# Patient Record
Sex: FEMALE | Race: WHITE | Employment: UNEMPLOYED | URBAN - METROPOLITAN AREA
[De-identification: names, ages, dates, MRNs, and addresses within clinical notes are randomized per-mention and may not be internally consistent; named-entity substitution may affect disease eponyms.]

---
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
## 2018-06-19 ENCOUNTER — OFFICE VISIT (OUTPATIENT)
Dept: INTERNAL MEDICINE CLINIC | Age: 18
End: 2018-06-19
Payer: COMMERCIAL

## 2018-06-19 VITALS
HEART RATE: 88 BPM | WEIGHT: 112 LBS | TEMPERATURE: 98 F | DIASTOLIC BLOOD PRESSURE: 70 MMHG | OXYGEN SATURATION: 97 % | HEIGHT: 64 IN | BODY MASS INDEX: 19.12 KG/M2 | RESPIRATION RATE: 16 BRPM | SYSTOLIC BLOOD PRESSURE: 118 MMHG

## 2018-06-19 DIAGNOSIS — R10.9 FLANK PAIN: Primary | ICD-10-CM

## 2018-06-19 DIAGNOSIS — N30.00 ACUTE CYSTITIS WITHOUT HEMATURIA: ICD-10-CM

## 2018-06-19 DIAGNOSIS — Z20.9 COMMUNICABLE DISEASE CONTACT: ICD-10-CM

## 2018-06-19 DIAGNOSIS — Z30.019 ENCOUNTER FOR INITIAL PRESCRIPTION OF CONTRACEPTIVES, UNSPECIFIED CONTRACEPTIVE: ICD-10-CM

## 2018-06-19 LAB
BACTERIA: NORMAL /HPF
BILIRUBIN URINE: NEGATIVE
BILIRUBIN, POC: NORMAL
BLOOD URINE, POC: NORMAL
BLOOD, URINE: NEGATIVE
CLARITY, POC: CLEAR
CLARITY: CLEAR
COLOR, POC: YELLOW
COLOR: YELLOW
EPITHELIAL CELLS, UA: NORMAL /HPF
GLUCOSE URINE, POC: NORMAL
GLUCOSE URINE: NEGATIVE MG/DL
KETONES, POC: NORMAL
KETONES, URINE: NEGATIVE MG/DL
LEUKOCYTE EST, POC: NORMAL
LEUKOCYTE ESTERASE, URINE: ABNORMAL
NITRITE, POC: NORMAL
NITRITE, URINE: NEGATIVE
PH UA: 6.5 (ref 5–9)
PH, POC: 6
PROTEIN UA: NEGATIVE MG/DL
PROTEIN, POC: NORMAL
RBC UA: NORMAL /HPF (ref 0–2)
RENAL EPITHELIAL, UA: NORMAL /HPF
SPECIFIC GRAVITY UA: 1.01 (ref 1–1.03)
SPECIFIC GRAVITY, POC: 1.02
URINE REFLEX TO CULTURE: YES
UROBILINOGEN, POC: NORMAL
UROBILINOGEN, URINE: 0.2 E.U./DL
WBC UA: NORMAL /HPF (ref 0–5)

## 2018-06-19 PROCEDURE — 81002 URINALYSIS NONAUTO W/O SCOPE: CPT | Performed by: PHYSICIAN ASSISTANT

## 2018-06-19 PROCEDURE — 99213 OFFICE O/P EST LOW 20 MIN: CPT | Performed by: PHYSICIAN ASSISTANT

## 2018-06-19 PROCEDURE — 81025 URINE PREGNANCY TEST: CPT | Performed by: PHYSICIAN ASSISTANT

## 2018-06-19 RX ORDER — MEDROXYPROGESTERONE ACETATE 150 MG/ML
150 INJECTION, SUSPENSION INTRAMUSCULAR ONCE
Qty: 1 ML | Refills: 3
Start: 2018-06-19 | End: 2018-09-11

## 2018-06-19 RX ORDER — FLUCONAZOLE 150 MG/1
150 TABLET ORAL ONCE
Qty: 1 TABLET | Refills: 1 | Status: SHIPPED | OUTPATIENT
Start: 2018-06-19 | End: 2018-06-19

## 2018-06-19 RX ORDER — CIPROFLOXACIN 500 MG/1
500 TABLET, FILM COATED ORAL 2 TIMES DAILY
Qty: 20 TABLET | Refills: 0 | Status: SHIPPED | OUTPATIENT
Start: 2018-06-19 | End: 2018-06-29

## 2018-06-19 ASSESSMENT — ENCOUNTER SYMPTOMS
EYES NEGATIVE: 1
RESPIRATORY NEGATIVE: 1
GASTROINTESTINAL NEGATIVE: 1

## 2018-06-19 ASSESSMENT — PATIENT HEALTH QUESTIONNAIRE - PHQ9
1. LITTLE INTEREST OR PLEASURE IN DOING THINGS: 0
SUM OF ALL RESPONSES TO PHQ9 QUESTIONS 1 & 2: 1
2. FEELING DOWN, DEPRESSED OR HOPELESS: 1
SUM OF ALL RESPONSES TO PHQ QUESTIONS 1-9: 1

## 2018-06-22 DIAGNOSIS — A74.9 CHLAMYDIA INFECTION: Primary | ICD-10-CM

## 2018-06-22 LAB
C. TRACHOMATIS DNA ,URINE: POSITIVE
N. GONORRHOEAE DNA, URINE: NEGATIVE
ORGANISM: ABNORMAL
URINE CULTURE, ROUTINE: ABNORMAL
URINE CULTURE, ROUTINE: ABNORMAL

## 2018-06-22 RX ORDER — DOXYCYCLINE HYCLATE 100 MG
100 TABLET ORAL 2 TIMES DAILY
Qty: 14 TABLET | Refills: 0 | Status: SHIPPED | OUTPATIENT
Start: 2018-06-22 | End: 2018-06-29

## 2018-07-31 ENCOUNTER — OFFICE VISIT (OUTPATIENT)
Dept: INTERNAL MEDICINE CLINIC | Age: 18
End: 2018-07-31
Payer: COMMERCIAL

## 2018-07-31 VITALS
SYSTOLIC BLOOD PRESSURE: 108 MMHG | BODY MASS INDEX: 19.56 KG/M2 | HEART RATE: 68 BPM | TEMPERATURE: 98.6 F | HEIGHT: 64 IN | DIASTOLIC BLOOD PRESSURE: 68 MMHG | RESPIRATION RATE: 16 BRPM | OXYGEN SATURATION: 98 % | WEIGHT: 114.6 LBS

## 2018-07-31 DIAGNOSIS — Z20.9 COMMUNICABLE DISEASE CONTACT: ICD-10-CM

## 2018-07-31 DIAGNOSIS — Z20.9 COMMUNICABLE DISEASE CONTACT: Primary | ICD-10-CM

## 2018-07-31 PROCEDURE — 99214 OFFICE O/P EST MOD 30 MIN: CPT | Performed by: PHYSICIAN ASSISTANT

## 2018-07-31 ASSESSMENT — ENCOUNTER SYMPTOMS
COUGH: 0
SHORTNESS OF BREATH: 0
HEARTBURN: 0
ABDOMINAL PAIN: 0
CONSTIPATION: 0
BACK PAIN: 0
NAUSEA: 1
DIARRHEA: 0
SORE THROAT: 0
VOMITING: 0
WHEEZING: 0
BLURRED VISION: 0

## 2018-08-01 LAB
BILIRUBIN URINE: NEGATIVE
BLOOD, URINE: NEGATIVE
CLARITY: CLEAR
CLUE CELLS: NORMAL
COLOR: YELLOW
EPITHELIAL CELLS, UA: NORMAL /HPF
GLUCOSE URINE: NEGATIVE MG/DL
KETONES, URINE: NEGATIVE MG/DL
LEUKOCYTE ESTERASE, URINE: ABNORMAL
NITRITE, URINE: NEGATIVE
PH UA: 7 (ref 5–9)
PROTEIN UA: NEGATIVE MG/DL
RBC UA: NORMAL /HPF (ref 0–2)
RENAL EPITHELIAL, UA: NORMAL /HPF
SPECIFIC GRAVITY UA: 1.03 (ref 1–1.03)
TRICHOMONAS PREP: NORMAL
TRICHOMONAS VAGINALIS SCREEN: NEGATIVE
URINE REFLEX TO CULTURE: YES
UROBILINOGEN, URINE: 1 E.U./DL
WBC UA: NORMAL /HPF (ref 0–5)
YEAST WET PREP: NORMAL

## 2018-08-02 LAB — URINE CULTURE, ROUTINE: NORMAL

## 2018-08-03 LAB
GENITAL CULTURE, ROUTINE: ABNORMAL
GENITAL CULTURE, ROUTINE: ABNORMAL
ORGANISM: ABNORMAL

## 2018-08-03 RX ORDER — AZITHROMYCIN 250 MG/1
TABLET, FILM COATED ORAL
Qty: 1 PACKET | Refills: 0 | Status: SHIPPED | OUTPATIENT
Start: 2018-08-03 | End: 2018-08-07

## 2018-08-07 LAB
C TRACH DNA GENITAL QL NAA+PROBE: NEGATIVE
N. GONORRHOEAE DNA: NEGATIVE

## 2018-09-11 ENCOUNTER — OFFICE VISIT (OUTPATIENT)
Dept: INTERNAL MEDICINE CLINIC | Age: 18
End: 2018-09-11
Payer: COMMERCIAL

## 2018-09-11 VITALS
TEMPERATURE: 97.9 F | HEART RATE: 70 BPM | BODY MASS INDEX: 19.53 KG/M2 | HEIGHT: 64 IN | DIASTOLIC BLOOD PRESSURE: 62 MMHG | RESPIRATION RATE: 16 BRPM | OXYGEN SATURATION: 98 % | WEIGHT: 114.4 LBS | SYSTOLIC BLOOD PRESSURE: 122 MMHG

## 2018-09-11 DIAGNOSIS — N92.6 IRREGULAR MENSES: Primary | ICD-10-CM

## 2018-09-11 DIAGNOSIS — Z30.019 ENCOUNTER FOR INITIAL PRESCRIPTION OF CONTRACEPTIVES, UNSPECIFIED CONTRACEPTIVE: ICD-10-CM

## 2018-09-11 PROCEDURE — 81025 URINE PREGNANCY TEST: CPT | Performed by: PHYSICIAN ASSISTANT

## 2018-09-11 PROCEDURE — 99213 OFFICE O/P EST LOW 20 MIN: CPT | Performed by: PHYSICIAN ASSISTANT

## 2018-09-11 RX ORDER — MEDROXYPROGESTERONE ACETATE 150 MG/ML
150 INJECTION, SUSPENSION INTRAMUSCULAR ONCE
Qty: 1 ML | Refills: 3 | Status: SHIPPED | OUTPATIENT
Start: 2018-09-11 | End: 2018-10-04

## 2018-09-11 ASSESSMENT — ENCOUNTER SYMPTOMS
HEARTBURN: 0
ABDOMINAL PAIN: 1
VOMITING: 0
BACK PAIN: 0
SHORTNESS OF BREATH: 0
DIARRHEA: 1
BLURRED VISION: 0
SORE THROAT: 0
NAUSEA: 0
COUGH: 0
WHEEZING: 0

## 2018-09-11 NOTE — PROGRESS NOTES
tobacco: Never Used    Alcohol use No    Drug use: No    Sexual activity: Yes     Partners: Male     Other Topics Concern    Not on file     Social History Narrative    No narrative on file     No family history on file. Allergies   Allergen Reactions    Pcn [Penicillins]      Current Outpatient Prescriptions   Medication Sig Dispense Refill    medroxyPROGESTERone (DEPO-PROVERA) 150 MG/ML injection Inject 1 mL into the muscle once for 1 dose 1 mL 3     No current facility-administered medications for this visit. Objective    Vitals:    09/11/18 1532   BP: 122/62   Site: Right Upper Arm   Position: Sitting   Cuff Size: Medium Adult   Pulse: 70   Resp: 16   Temp: 97.9 °F (36.6 °C)   TempSrc: Oral   SpO2: 98%   Weight: 114 lb 6.4 oz (51.9 kg)   Height: 5' 4\" (1.626 m)     Physical Exam   Constitutional: She is oriented to person, place, and time and well-developed, well-nourished, and in no distress. HENT:   Head: Normocephalic and atraumatic. Eyes: Pupils are equal, round, and reactive to light. Conjunctivae and EOM are normal.   Neck: Normal range of motion. Neck supple. No thyromegaly present. Abdominal: Soft. Bowel sounds are normal. She exhibits no mass. There is no guarding. Mild discomfort llq   Musculoskeletal: Normal range of motion. Neurological: She is alert and oriented to person, place, and time. Gait normal.   Skin: Skin is warm and dry. Psychiatric: Affect normal.            Assessment & Plan    Diagnosis Orders   1. Irregular menses  TSH with Reflex    neg urine preg   2.  Encounter for initial prescription of contraceptives, unspecified contraceptive  POC Pregnancy Urine Qual    medroxyPROGESTERone (DEPO-PROVERA) 150 MG/ML injection         Orders Placed This Encounter   Procedures    TSH with Reflex     Standing Status:   Future     Standing Expiration Date:   9/11/2019    POC Pregnancy Urine Qual     Orders Placed This Encounter   Medications    medroxyPROGESTERone

## 2018-10-04 ENCOUNTER — OFFICE VISIT (OUTPATIENT)
Dept: INTERNAL MEDICINE CLINIC | Age: 18
End: 2018-10-04
Payer: COMMERCIAL

## 2018-10-04 VITALS
TEMPERATURE: 97.6 F | OXYGEN SATURATION: 99 % | DIASTOLIC BLOOD PRESSURE: 72 MMHG | HEART RATE: 96 BPM | SYSTOLIC BLOOD PRESSURE: 114 MMHG | RESPIRATION RATE: 16 BRPM | WEIGHT: 115.4 LBS | BODY MASS INDEX: 19.7 KG/M2 | HEIGHT: 64 IN

## 2018-10-04 DIAGNOSIS — Z20.9 COMMUNICABLE DISEASE CONTACT: ICD-10-CM

## 2018-10-04 DIAGNOSIS — Z20.9 COMMUNICABLE DISEASE CONTACT: Primary | ICD-10-CM

## 2018-10-04 PROCEDURE — 99214 OFFICE O/P EST MOD 30 MIN: CPT | Performed by: PHYSICIAN ASSISTANT

## 2018-10-04 ASSESSMENT — ENCOUNTER SYMPTOMS
ABDOMINAL PAIN: 1
VOMITING: 0
NAUSEA: 0
SORE THROAT: 0
CONSTIPATION: 0
BLURRED VISION: 0
COUGH: 0
WHEEZING: 0
BACK PAIN: 0
DIARRHEA: 0
SHORTNESS OF BREATH: 0
HEARTBURN: 0

## 2018-10-04 NOTE — PROGRESS NOTES
outpatient prescriptions on file. No current facility-administered medications for this visit. Objective    Vitals:    10/04/18 1015   BP: 114/72   Site: Right Upper Arm   Position: Sitting   Cuff Size: Medium Adult   Pulse: 96   Resp: 16   Temp: 97.6 °F (36.4 °C)   TempSrc: Oral   SpO2: 99%   Weight: 115 lb 6.4 oz (52.3 kg)   Height: 5' 4\" (1.626 m)     Physical Exam   Constitutional: She is oriented to person, place, and time and well-developed, well-nourished, and in no distress. Genitourinary: Vulva normal. Vagina exhibits normal mucosa and no lesion. Genitourinary Comments: On menses   Neurological: She is alert and oriented to person, place, and time. Skin: Skin is warm and dry. Assessment & Plan    Diagnosis Orders   1. Communicable disease contact           No orders of the defined types were placed in this encounter. No orders of the defined types were placed in this encounter. Medications Discontinued During This Encounter   Medication Reason    medroxyPROGESTERone (DEPO-PROVERA) 150 MG/ML injection Patient Choice     No Follow-up on file.     Yvonne Ladd PA-C

## 2018-10-05 LAB
CLUE CELLS: NORMAL
TRICHOMONAS PREP: NORMAL
TRICHOMONAS VAGINALIS SCREEN: NEGATIVE
YEAST WET PREP: NORMAL

## 2018-10-07 LAB
GENITAL CULTURE, ROUTINE: ABNORMAL
GENITAL CULTURE, ROUTINE: ABNORMAL
ORGANISM: ABNORMAL

## 2018-10-08 RX ORDER — FLUCONAZOLE 150 MG/1
150 TABLET ORAL ONCE
Qty: 1 TABLET | Refills: 1 | Status: SHIPPED | OUTPATIENT
Start: 2018-10-08 | End: 2018-10-08

## 2018-10-11 LAB
C TRACH DNA GENITAL QL NAA+PROBE: NEGATIVE
N. GONORRHOEAE DNA: NEGATIVE

## 2019-03-05 ENCOUNTER — OFFICE VISIT (OUTPATIENT)
Dept: INTERNAL MEDICINE CLINIC | Age: 19
End: 2019-03-05
Payer: COMMERCIAL

## 2019-03-05 VITALS
TEMPERATURE: 98.2 F | WEIGHT: 119.8 LBS | SYSTOLIC BLOOD PRESSURE: 100 MMHG | BODY MASS INDEX: 20.56 KG/M2 | OXYGEN SATURATION: 98 % | RESPIRATION RATE: 12 BRPM | DIASTOLIC BLOOD PRESSURE: 68 MMHG | HEART RATE: 86 BPM

## 2019-03-05 DIAGNOSIS — K42.9 UMBILICAL HERNIA WITHOUT OBSTRUCTION AND WITHOUT GANGRENE: ICD-10-CM

## 2019-03-05 DIAGNOSIS — N76.0 VAGINOSIS: Primary | ICD-10-CM

## 2019-03-05 LAB
BILIRUBIN, POC: NORMAL
BLOOD URINE, POC: NORMAL
CLARITY, POC: CLEAR
COLOR, POC: YELLOW
GLUCOSE URINE, POC: NORMAL
KETONES, POC: NORMAL
LEUKOCYTE EST, POC: NORMAL
NITRITE, POC: NORMAL
PH, POC: 7.5
PROTEIN, POC: NORMAL
SPECIFIC GRAVITY, POC: 1.01
UROBILINOGEN, POC: NORMAL

## 2019-03-05 PROCEDURE — 99213 OFFICE O/P EST LOW 20 MIN: CPT | Performed by: PHYSICIAN ASSISTANT

## 2019-03-05 PROCEDURE — 81002 URINALYSIS NONAUTO W/O SCOPE: CPT | Performed by: PHYSICIAN ASSISTANT

## 2019-03-05 RX ORDER — METRONIDAZOLE 250 MG/1
250 TABLET ORAL 3 TIMES DAILY
Qty: 21 TABLET | Refills: 0 | Status: SHIPPED | OUTPATIENT
Start: 2019-03-05 | End: 2019-03-12

## 2019-03-05 ASSESSMENT — PATIENT HEALTH QUESTIONNAIRE - PHQ9
SUM OF ALL RESPONSES TO PHQ9 QUESTIONS 1 & 2: 0
1. LITTLE INTEREST OR PLEASURE IN DOING THINGS: 0
SUM OF ALL RESPONSES TO PHQ QUESTIONS 1-9: 0
2. FEELING DOWN, DEPRESSED OR HOPELESS: 0
SUM OF ALL RESPONSES TO PHQ QUESTIONS 1-9: 0
DEPRESSION UNABLE TO ASSESS: FUNCTIONAL CAPACITY MOTIVATION LIMITS ACCURACY

## 2019-03-05 ASSESSMENT — ENCOUNTER SYMPTOMS
SHORTNESS OF BREATH: 0
ALLERGIC/IMMUNOLOGIC NEGATIVE: 1
EYE ITCHING: 0
ABDOMINAL PAIN: 0
SORE THROAT: 0
DIARRHEA: 0
CONSTIPATION: 0
COUGH: 0
SINUS PRESSURE: 0
EYE DISCHARGE: 0

## 2020-07-15 ENCOUNTER — OFFICE VISIT (OUTPATIENT)
Dept: OBGYN CLINIC | Age: 20
End: 2020-07-15
Payer: COMMERCIAL

## 2020-07-15 VITALS
BODY MASS INDEX: 21 KG/M2 | DIASTOLIC BLOOD PRESSURE: 58 MMHG | HEIGHT: 64 IN | WEIGHT: 123 LBS | SYSTOLIC BLOOD PRESSURE: 98 MMHG

## 2020-07-15 DIAGNOSIS — N91.2 AMENORRHEA: ICD-10-CM

## 2020-07-15 LAB
BACTERIA: NEGATIVE /HPF
BASOPHILS ABSOLUTE: 0 K/UL (ref 0–0.2)
BASOPHILS RELATIVE PERCENT: 0.3 %
BILIRUBIN URINE: NEGATIVE
BLOOD, URINE: NEGATIVE
CLARITY: CLEAR
COLOR: YELLOW
EOSINOPHILS ABSOLUTE: 0.2 K/UL (ref 0–0.7)
EOSINOPHILS RELATIVE PERCENT: 2.1 %
EPITHELIAL CELLS, UA: ABNORMAL /HPF (ref 0–5)
GLUCOSE URINE: NEGATIVE MG/DL
HCG, URINE, POC: POSITIVE
HCT VFR BLD CALC: 42.6 % (ref 37–47)
HEMOGLOBIN: 14.3 G/DL (ref 12–16)
HEPATITIS B SURFACE ANTIGEN INTERPRETATION: NORMAL
HYALINE CASTS: ABNORMAL /HPF (ref 0–5)
KETONES, URINE: NEGATIVE MG/DL
LEUKOCYTE ESTERASE, URINE: ABNORMAL
LYMPHOCYTES ABSOLUTE: 2.4 K/UL (ref 1–4.8)
LYMPHOCYTES RELATIVE PERCENT: 26.5 %
Lab: ABNORMAL
MCH RBC QN AUTO: 33 PG (ref 27–31.3)
MCHC RBC AUTO-ENTMCNC: 33.6 % (ref 33–37)
MCV RBC AUTO: 98.3 FL (ref 82–100)
MONOCYTES ABSOLUTE: 0.5 K/UL (ref 0.2–0.8)
MONOCYTES RELATIVE PERCENT: 5.8 %
NEGATIVE QC PASS/FAIL: ABNORMAL
NEUTROPHILS ABSOLUTE: 5.8 K/UL (ref 1.4–6.5)
NEUTROPHILS RELATIVE PERCENT: 65.3 %
NITRITE, URINE: NEGATIVE
PDW BLD-RTO: 13.2 % (ref 11.5–14.5)
PH UA: 8 (ref 5–9)
PLATELET # BLD: 224 K/UL (ref 130–400)
POSITIVE QC PASS/FAIL: ABNORMAL
PROTEIN UA: NEGATIVE MG/DL
RBC # BLD: 4.33 M/UL (ref 4.2–5.4)
RBC UA: ABNORMAL /HPF (ref 0–5)
RUBELLA ANTIBODY IGG: 67.4 IU/ML
SPECIFIC GRAVITY UA: 1.02 (ref 1–1.03)
UROBILINOGEN, URINE: 1 E.U./DL
WBC # BLD: 8.9 K/UL (ref 4.8–10.8)
WBC UA: ABNORMAL /HPF (ref 0–5)

## 2020-07-15 PROCEDURE — 99204 OFFICE O/P NEW MOD 45 MIN: CPT | Performed by: OBSTETRICS & GYNECOLOGY

## 2020-07-15 PROCEDURE — 81025 URINE PREGNANCY TEST: CPT | Performed by: OBSTETRICS & GYNECOLOGY

## 2020-07-15 RX ORDER — ASCORBIC ACID, CHOLECALCIFEROL, .ALPHA.-TOCOPHEROL ACETATE, DL-, PYRIDOXINE HYDROCHLORIDE, FOLIC ACID, CYANOCOBALAMIN, BIOTIN, CALCIUM CARBONATE, FERROUS ASPARTO GLYCINATE, IRON, POTASSIUM IODIDE, MAGNESIUM OXIDE, DOCONEXENT AND LOWBUSH BLUEBERRY 60; 1000; 10; 26; 400; 13; 280; 80; 9; 9; 150; 25; 350; 25; 600 MG/1; [IU]/1; [IU]/1; MG/1; UG/1; UG/1; UG/1; MG/1; MG/1; MG/1; UG/1; MG/1; MG/1; MG/1; UG/1
1 CAPSULE, GELATIN COATED ORAL DAILY
Qty: 30 CAPSULE | Refills: 5 | Status: SHIPPED | OUTPATIENT
Start: 2020-07-15 | End: 2020-10-20 | Stop reason: SDUPTHER

## 2020-07-15 RX ORDER — DOCUSATE SODIUM 100 MG/1
100 CAPSULE, LIQUID FILLED ORAL 2 TIMES DAILY
Qty: 60 CAPSULE | Refills: 3 | Status: SHIPPED | OUTPATIENT
Start: 2020-07-15 | End: 2020-08-14

## 2020-07-15 ASSESSMENT — PATIENT HEALTH QUESTIONNAIRE - PHQ9
SUM OF ALL RESPONSES TO PHQ QUESTIONS 1-9: 0
2. FEELING DOWN, DEPRESSED OR HOPELESS: 0
SUM OF ALL RESPONSES TO PHQ QUESTIONS 1-9: 0
SUM OF ALL RESPONSES TO PHQ9 QUESTIONS 1 & 2: 0
1. LITTLE INTEREST OR PLEASURE IN DOING THINGS: 0

## 2020-07-15 ASSESSMENT — ENCOUNTER SYMPTOMS
VOMITING: 0
ABDOMINAL PAIN: 0
NAUSEA: 0
SHORTNESS OF BREATH: 0
COUGH: 0
DIARRHEA: 0
WHEEZING: 0
ABDOMINAL DISTENTION: 0
SORE THROAT: 0
CONSTIPATION: 0
BLOOD IN STOOL: 0

## 2020-07-16 LAB
ABO/RH: NORMAL
ANTIBODY SCREEN: NORMAL
RPR: NORMAL

## 2020-07-16 NOTE — PROGRESS NOTES
Subjective:      Patient ID:  Annette Urena is a 22 y.o. female with chief complaint of:  Chief Complaint   Patient presents with    Amenorrhea       Patient presents for pregnancy confirmation. Patient denies bleeding cramping, however noted occasional nausea and vomiting and breast tenderness. Pt with previous sab no other concerning gyn history      History reviewed. No pertinent past medical history. History reviewed. No pertinent surgical history. Family History   Problem Relation Age of Onset    Cancer Sister         leukemia     No current outpatient medications on file prior to visit. No current facility-administered medications on file prior to visit. Allergies:  Pcn [penicillins]    Review of Systems   Constitutional: Negative for activity change, appetite change, fatigue and unexpected weight change. HENT: Negative for nosebleeds and sore throat. Eyes: Negative for visual disturbance. Respiratory: Negative for cough, shortness of breath and wheezing. Cardiovascular: Negative for chest pain, palpitations and leg swelling. Gastrointestinal: Negative for abdominal distention, abdominal pain, blood in stool, constipation, diarrhea, nausea and vomiting. Endocrine: Negative for cold intolerance, heat intolerance, polydipsia and polyuria. Genitourinary: Negative for difficulty urinating, dyspareunia, dysuria, frequency, genital sores, hematuria, menstrual problem, pelvic pain, urgency, vaginal bleeding, vaginal discharge and vaginal pain. Musculoskeletal: Negative for arthralgias. Skin: Negative for rash. Neurological: Negative for dizziness, weakness, light-headedness and headaches. Hematological: Negative for adenopathy. Does not bruise/bleed easily. Psychiatric/Behavioral: Negative for confusion and sleep disturbance.        Objective:   BP (!) 98/58   Ht 5' 4\" (1.626 m)   Wt 123 lb (55.8 kg)   LMP 05/09/2020   BMI 21.11 kg/m²      Physical Exam  Constitutional: General: She is not in acute distress. Appearance: She is well-developed. She is not diaphoretic. HENT:      Nose: Nose normal.   Eyes:      Conjunctiva/sclera: Conjunctivae normal.      Pupils: Pupils are equal, round, and reactive to light. Neck:      Thyroid: No thyromegaly. Trachea: No tracheal deviation. Cardiovascular:      Rate and Rhythm: Normal rate and regular rhythm. Heart sounds: Normal heart sounds. No murmur. No friction rub. No gallop. Pulmonary:      Effort: Pulmonary effort is normal. No respiratory distress. Breath sounds: Normal breath sounds. No wheezing or rales. Chest:      Chest wall: No tenderness. Abdominal:      General: Bowel sounds are normal. There is no distension. Palpations: Abdomen is soft. There is no mass. Tenderness: There is no abdominal tenderness. There is no guarding or rebound. Comments: No fetal heart tones   Genitourinary:     Labia:         Right: No rash, tenderness or lesion. Left: No rash, tenderness or lesion. Vagina: Normal. No vaginal discharge, erythema, tenderness or bleeding. Cervix: No cervical motion tenderness, discharge or friability. Uterus: Not deviated, not enlarged, not fixed and not tender. Adnexa:         Right: No mass, tenderness or fullness. Left: No mass, tenderness or fullness. Comments: Uterus is not prolapsed and there is not a cystocele or rectocele noted  Skin:     General: Skin is warm and dry. Neurological:      Mental Status: She is alert and oriented to person, place, and time. Cranial Nerves: No cranial nerve deficit. Deep Tendon Reflexes: Reflexes normal.   Psychiatric:         Mood and Affect: Mood normal.         Behavior: Behavior normal.         Judgment: Judgment normal.         Assessment:       Diagnosis Orders   1.  Amenorrhea  POC Pregnancy Urine Qual    PAP SMEAR    US OB Transvaginal    US OB LESS THAN 14 WEEKS SINGLE OR FIRST GESTATION    Culture, Urine    Urinalysis    Drug Panel 9A Screen, Urine    CBC Auto Differential    Cystic fibrosis carrier study    Hemoglobinopathy Eval (Electrophoresis)    Hepatitis B Surface Antigen    Herpes Simplex Virus (HSV) I Glycoprotein Antibody IgG    Herpes Simplex Virus (HSV) II Glycoprotein Antibody IgG    HIV-1,2 Combo Ag/Ab By TY, Reflexive Panel    RPR Reflex to Titer and TPPA    Rubella antibody, IgG    Type and screen    Varicella Zoster Antibody, IgG   2. Screening for HPV (human papillomavirus)  PAP SMEAR   3. Routine screening for STI (sexually transmitted infection)  PAP SMEAR         Plan:      Orders Placed This Encounter   Procedures    Culture, Urine     Standing Status:   Future     Number of Occurrences:   1     Standing Expiration Date:   7/15/2021     Order Specific Question:   Specify (ex-cath, midstream, cysto, etc)? Answer:   midstream    US OB Transvaginal     Standing Status:   Future     Standing Expiration Date:   7/15/2021     Order Specific Question:   Reason for exam:     Answer:   EFW, GUILLERMO, presenation and placenta evaluation    US OB LESS THAN 14 WEEKS SINGLE OR FIRST GESTATION     Standing Status:   Future     Standing Expiration Date:   7/15/2021     Order Specific Question:   Reason for exam:     Answer:   EFW, GUILLERMO, presenation and placenta evaluation    PAP SMEAR     Standing Status:   Future     Standing Expiration Date:   7/15/2021     Order Specific Question:   Collection Type     Answer: Thin Prep     Order Specific Question:   Prior Abnormal Pap Test     Answer:   No     Order Specific Question:   Screening or Diagnostic     Answer:   Screening     Order Specific Question:   HPV Requested?      Answer:   Yes -  If ASCUS Reflex HPV     Comments:   16/18     Order Specific Question:   High Risk Patient     Answer:   N/A    Urinalysis     Standing Status:   Future     Number of Occurrences:   1     Standing Expiration Date:   7/15/2021    Drug

## 2020-07-17 LAB
ALCOHOL URINE: NEGATIVE MG/DL
AMPHETAMINE SCREEN, URINE: NEGATIVE NG/ML
BARBITURATE SCREEN URINE: NEGATIVE NG/ML
BENZODIAZEPINE SCREEN, URINE: NEGATIVE NG/ML
CANNABINOID SCREEN URINE: POSITIVE NG/ML
COCAINE METABOLITE SCREEN URINE: NEGATIVE NG/ML
CREATININE URINE: 156.1 MG/DL (ref 20–400)
Lab: NORMAL
MDMA URINE: NEGATIVE NG/ML
METHADONE SCREEN, URINE: NEGATIVE NG/ML
OPIATE SCREEN URINE: NEGATIVE NG/ML
OXYCODONE SCREEN URINE: NEGATIVE NG/ML
PHENCYCLIDINE SCREEN URINE: NEGATIVE NG/ML
PROPOXYPHENE SCREEN: NEGATIVE NG/ML
URINE CULTURE, ROUTINE: NORMAL

## 2020-07-18 LAB
HEMOGLOBIN A-1 QUANTITATION: 96.9 % (ref 95–97.9)
HEMOGLOBIN A2 QUANTITATION: 2.8 % (ref 2–3.5)
HEMOGLOBIN C QUANTITATION: 0 % (ref 0–0)
HEMOGLOBIN E QUANTITATION: 0 % (ref 0–0)
HEMOGLOBIN ELECTROPHORESIS: NORMAL
HEMOGLOBIN EVALUATION: NORMAL
HEMOGLOBIN F QUANTITATION: 0.3 % (ref 0–2.1)
HEMOGLOBIN OTHER: 0 % (ref 0–0)
HEMOGLOBIN S QUANTITATION: 0 % (ref 0–0)
HIV 1,2 COMBO ANTIGEN/ANTIBODY: NEGATIVE
HSV 1 GLYCOPROTEIN G AB IGG: 0.15 IV
HSV 2 GLYCOPROTEIN G AB IGG: 21.4 IV
SICKLE CELL: NORMAL
VZV IGG SER QL IA: 315.1 IV

## 2020-07-26 LAB
CYSTIC FIBROSIS 165 VARIANTS INTERP: NORMAL
CYSTIC FIBROSIS 5T VARIANT: NORMAL
CYSTIC FIBROSIS ALLELE 1: NEGATIVE
CYSTIC FIBROSIS ALLELE 2: NEGATIVE

## 2020-07-27 ENCOUNTER — TELEPHONE (OUTPATIENT)
Dept: OBGYN CLINIC | Age: 20
End: 2020-07-27

## 2020-07-27 NOTE — TELEPHONE ENCOUNTER
lvm for pt to james back for results. Pt pap is normal but BV was detected and pt will start antibiotic for it at 12 wks.  WC will put order once pt is able to start taking medication

## 2020-08-05 ENCOUNTER — HOSPITAL ENCOUNTER (OUTPATIENT)
Dept: ULTRASOUND IMAGING | Age: 20
Discharge: HOME | End: 2020-08-07
Payer: COMMERCIAL

## 2020-08-05 PROCEDURE — 76801 OB US < 14 WKS SINGLE FETUS: CPT

## 2020-08-07 ENCOUNTER — TELEPHONE (OUTPATIENT)
Dept: OBGYN CLINIC | Age: 20
End: 2020-08-07

## 2020-08-07 ENCOUNTER — INITIAL PRENATAL (OUTPATIENT)
Dept: OBGYN CLINIC | Age: 20
End: 2020-08-07

## 2020-08-07 VITALS
BODY MASS INDEX: 21.73 KG/M2 | HEART RATE: 94 BPM | WEIGHT: 126.6 LBS | SYSTOLIC BLOOD PRESSURE: 98 MMHG | DIASTOLIC BLOOD PRESSURE: 60 MMHG

## 2020-08-07 DIAGNOSIS — Z3A.12 12 WEEKS GESTATION OF PREGNANCY: ICD-10-CM

## 2020-08-07 DIAGNOSIS — Z34.82 ENCOUNTER FOR SUPERVISION OF OTHER NORMAL PREGNANCY IN SECOND TRIMESTER: ICD-10-CM

## 2020-08-07 RX ORDER — METRONIDAZOLE 500 MG/1
500 TABLET ORAL 2 TIMES DAILY
Qty: 14 TABLET | Refills: 0 | Status: SHIPPED | OUTPATIENT
Start: 2020-08-07 | End: 2020-08-14

## 2020-08-07 NOTE — TELEPHONE ENCOUNTER
Pt was seen today thought flagyl would be called into the pharmacy, please advise if you will be sending the medication to [facility]/[address]. Pt phone number is [phone].

## 2020-08-07 NOTE — PROGRESS NOTES
Initial OB visit      Parveen Landeros is a 22y.o. year old female  @ 12.6w presents for continuation of pregnancy care. Labs reviewed and pelvic US reviewed. The US is consistent with lmp. No complaints tolerating diet no problems with bladder or bowel function. Patient informed about HSV results she is very upset. Family history of Chromosome 4p/ trisomy 25 and congenital heart defect among siblings    POB: 1 sab    PGYN: na/a    PMH: n/a    PSH: n/a     MEDS: prenatal vit    Drug Allergies: pcn family history    SOCHX: negative times 3    FH: Mother or Father , DM No , HTN No, Other No    BP 98/60   Pulse 94   Wt 126 lb 9.6 oz (57.4 kg)   LMP 2020   BMI 21.73 kg/m²   No past medical history on file. No past surgical history on file. Review of Systems  Constitutional: negative for anorexia, chills and fatigue  Genitourinary:negative for genital lesions and vaginal discharge, dysuria and frequency, see above  Integument/breast: negative for dryness and pruritus  Behavioral/Psych: negative for abusive relationship, aggressive behavior and anxiety  Endocrine: negative     All other systems reviewed and are negative. Physical Exam:  Skin: Warm, dry, no lesions or rashes  Extremities: Without clubbing, cyanosis and edema. Palms and nails are normal. Ambulates without difficulty  Neurological: No gross sensory or motor deficits. Abdomen: Soft, non-tender without masses or organomegaly  bowel sounds normoactive    Assessment:   1. Encounter for supervision of other normal pregnancy in second trimester    2. 12 weeks gestation of pregnancy        Plan:   Orders Placed This Encounter   Procedures    Prenatal Testing for Fetal Aneuploidy     Standing Status:   Future     Standing Expiration Date:   2021      No orders of the defined types were placed in this encounter.     Plan:   DO Lyric Ibarra  2020  Patient's last menstrual period was 2020. INITIAL OBSTETRICAL VISIT EVALUATION:  The patient was seen full history and physical was completed/reviewed. Cytology was collected for patients over 24years of age. Cultures were collected. The patient was counseled on office policies and she was counseled on termination of pregnancy in the state of [place]. The patient was counseled on Toxoplasmosis, HIV, Tobacco Abuse, Group Beta Strep Infections, Cystic Fibrosis,  Labor precautions and Sickle Cell disease. The patient was counseled on the risks of tobacco abuse. Both maternal and fetal. She was instructed to stop smoking if currently using tobacco. Morbidity, mortality, and cessation programs were reviewed. The risks include but are not limited to increased risks of  labor,  delivery, premature rupture of membranes, intrauterine growth restriction, intrauterine fetal demise and abruptio placenta. Secondary smoke risks were also reviewed. Increases in cancer, respiratory problems, and sudden infant death syndrome were reviewed as well. The patient was informed of a 2-4% risk of congenital anomalies in the general population. She was also informed that karyotyping is the only way to evaluate the fetus for genetic problems and genetic lethal anomalies. Chorionic villous sampling, amniocentesis and Maternal Genetic Blood Sampling-(NIPT Testing) were also discussed with morbidity rates in detail. She requested any of the options. Route of delivery and counseling on vaginal, operative vaginal, and  sections were completed with the risks of each to both the patient as well as her baby. The possibility of a blood transfusion was discussed as well. The patient was not opposed to receiving a transfusion if needed. First trimester screening and MSAFP single marker testing was reviewed in detail with attention to timing of testing and their windows. A second trimester amniocentesis with M consults is also made

## 2020-08-15 LAB
EER NON INVASIVE PRENATAL ANEUPLOIDY: NORMAL
FETAL FRACTION: 11.9 %
FETAL GENDER: NORMAL
FETUS COUNT: 1
GESTATIONAL AGE (DAYS): 6
GESTATIONAL AGE(WEEKS): 12
HEIGHT: NORMAL
Lab: NORMAL
MATERNAL WEIGHT: 123
MONOSOMY X: NORMAL
REPORT FETUS GENDER: YES
TRIPLOIDY (VANISHING TWIN): NORMAL
TRISOMY 13 RISK: NORMAL
TRISOMY 18 RISK ASSESSMENT: NORMAL
TRISOMY 21 RISK: NORMAL

## 2020-09-04 ENCOUNTER — ROUTINE PRENATAL (OUTPATIENT)
Dept: OBGYN CLINIC | Age: 20
End: 2020-09-04

## 2020-09-04 VITALS
WEIGHT: 128.6 LBS | DIASTOLIC BLOOD PRESSURE: 60 MMHG | BODY MASS INDEX: 22.07 KG/M2 | SYSTOLIC BLOOD PRESSURE: 100 MMHG | HEART RATE: 82 BPM

## 2020-09-04 DIAGNOSIS — Z3A.16 16 WEEKS GESTATION OF PREGNANCY: ICD-10-CM

## 2020-09-04 DIAGNOSIS — Z34.82 ENCOUNTER FOR SUPERVISION OF OTHER NORMAL PREGNANCY IN SECOND TRIMESTER: ICD-10-CM

## 2020-09-04 PROCEDURE — 0502F SUBSEQUENT PRENATAL CARE: CPT | Performed by: OBSTETRICS & GYNECOLOGY

## 2020-09-04 NOTE — PROGRESS NOTES
Patient's last menstrual period was 05/09/2020. Please reference prenatal and OB flow chart for further information  PT here today for routine prenatal care  Pt endorses fetal movement and denies loss of fluid, contractions or vaginal bleeding  Pt without complaints  ROS:  Pt denies headache, dysuria, nausea/vomiting  PE:  /60   Pulse 82   Wt 128 lb 9.6 oz (58.3 kg)   LMP 05/09/2020   BMI 22.07 kg/m²   Gen - Alert and oriented x 3  HEENT- NC/AT, CVS - RRR, Lungs - CTAB  Abd - FH Appropriate fetal growth  LE no edema  Reassuring fetal status at this time     Diagnosis Orders   1. Encounter for supervision of other normal pregnancy in second trimester  Alpha Fetoprotein, Maternal    US OB 14 PLUS WEEKS SINGLE OR FIRST GESTATION    US OB TRANSVAGINAL   2. 16 weeks gestation of pregnancy  Alpha Fetoprotein, Maternal    US OB 14 PLUS WEEKS SINGLE OR FIRST GESTATION    US OB TRANSVAGINAL       Upon completion of the visit all questions were answered and the patients follow-up and testing schedule were reviewed.

## 2020-09-06 LAB
AFP INTERPRETATION: NORMAL
AFP MOM: 1.05
AFP SPECIMEN: NORMAL
DATING: NORMAL
ESTIMATED DUE DATE: NORMAL
FETUS COUNT: NORMAL
GESTATIONAL AGE CALC AT COLLECT: NORMAL
HISTORY/NEURAL TUBE DEFECTS: NO
INSULIN DEP. DIABETIC: NO
MATERNAL AGE AT EDD: 22.3 YR
MATERNAL WEIGHT: NORMAL
PT AFP: 43 NG/ML
RACE: NORMAL
SMOKING: NO

## 2020-09-18 ENCOUNTER — TELEPHONE (OUTPATIENT)
Dept: OBGYN CLINIC | Age: 20
End: 2020-09-18

## 2020-09-21 ENCOUNTER — ROUTINE PRENATAL (OUTPATIENT)
Dept: OBGYN CLINIC | Age: 20
End: 2020-09-21

## 2020-09-21 VITALS
WEIGHT: 132 LBS | HEART RATE: 77 BPM | BODY MASS INDEX: 22.66 KG/M2 | SYSTOLIC BLOOD PRESSURE: 100 MMHG | DIASTOLIC BLOOD PRESSURE: 62 MMHG

## 2020-09-21 DIAGNOSIS — B96.89 BACTERIAL VAGINITIS: ICD-10-CM

## 2020-09-21 DIAGNOSIS — B37.31 CANDIDAL VAGINITIS: ICD-10-CM

## 2020-09-21 DIAGNOSIS — N76.0 BACTERIAL VAGINITIS: ICD-10-CM

## 2020-09-21 PROCEDURE — 0502F SUBSEQUENT PRENATAL CARE: CPT | Performed by: ADVANCED PRACTICE MIDWIFE

## 2020-09-21 RX ORDER — METRONIDAZOLE 500 MG/1
500 TABLET ORAL 2 TIMES DAILY
Qty: 14 TABLET | Refills: 1 | Status: SHIPPED | OUTPATIENT
Start: 2020-09-21 | End: 2020-09-28

## 2020-09-21 RX ORDER — MICONAZOLE NITRATE 2 %
CREAM WITH APPLICATOR VAGINAL
Qty: 1 KIT | Refills: 0 | Status: ON HOLD | OUTPATIENT
Start: 2020-09-21 | End: 2021-01-07 | Stop reason: HOSPADM

## 2020-09-21 NOTE — PROGRESS NOTES
Thinks she may have an STD. Has recent dx of herpes, doesn't see any lesions but has irritation, painful vaginal area and a yellowish green discharge no odor.

## 2020-09-21 NOTE — PATIENT INSTRUCTIONS
Patient Education        miconazole vaginal  Pronunciation:  my CAW nah zole  Brand:  Micon 7, Miconazole 3, Monistat 3, Monistat 7, Monistat-1 Day or Night, M-Zole 3, Vagistat-3  What is the most important information I should know about miconazole vaginal?  If this is the first time that you have ever had symptoms of a vaginal yeast infection, consult your doctor before using this medication. What is miconazole vaginal?  Miconazole is an antifungal medication. It prevents fungus from growing. Miconazole vaginal is used to treat vaginal candida (yeast) infections. Miconazole vaginal may also be used for purposes not listed in this medication guide. What should I discuss with my healthcare provider before using miconazole vaginal?  If this is the first time that you have ever had symptoms of a vaginal yeast infection, consult your doctor before using this medication. You should not use miconazole vaginal if you have ever had an allergic reaction to it. To make sure miconazole vaginal is safe for you, tell your doctor if you have:  · a fever,  · abdominal pain,  · foul-smelling discharge,  · diabetes, or  · HIV or AIDS. Do not use this medicine without a doctor's advice if you are pregnant. Do not use this medicine without a doctor's advice if you are breast-feeding a baby. How should I use miconazole vaginal?  Use exactly as directed on the label, or as prescribed by your doctor. Do not use in larger or smaller amounts or for longer than recommended. Do not take this medicine by mouth. It is for use only in your vagina. Wash your hands before and after using miconazole vaginal.  Insert the tablet, suppository, or cream into the vagina using the applicator as directed. You can use a sanitary napkin to prevent the medication from staining your clothing but do not use a tampon. Use this medication for the full prescribed length of time, even during your menstrual period.  Your symptoms may improve effect on vaginally applied miconazole. But many drugs can interact with each other. Tell each of your health care providers about all medicines you use, including prescription and over-the-counter medicines, vitamins, and herbal products. Where can I get more information? Your pharmacist can provide more information about miconazole vaginal.  Remember, keep this and all other medicines out of the reach of children, never share your medicines with others, and use this medication only for the indication prescribed. Every effort has been made to ensure that the information provided by [address] is accurate, up-to-date, and complete, but no guarantee is made to that effect. Drug information contained herein may be time sensitive. [facility] information has been compiled for use by healthcare practitioners and consumers in the [place] and therefore [facility] does not warrant that uses outside of the [place] are appropriate, unless specifically indicated otherwise. [facility]'s drug information does not endorse drugs, diagnose patients or recommend therapy. [facility] drug information is an informational resource designed to assist licensed healthcare practitioners in caring for their patients and/or to serve consumers viewing this service as a supplement to, and not a substitute for, the expertise, skill, knowledge and judgment of healthcare practitioners. The absence of a warning for a given drug or drug combination in no way should be construed to indicate that the drug or drug combination is safe, effective or appropriate for any given patient. [facility] does not assume any responsibility for any aspect of healthcare administered with the aid of information [facility] provides. The information contained herein is not intended to cover all possible uses, directions, precautions, warnings, drug interactions, allergic reactions, or adverse effects.  If you have questions about the drugs you are taking, check with your doctor, nurse or pharmacist.  Copyright 1982-0553 Vern [address] [place] [address] Avenue: 4.07. Revision date: 11/19/2013. Care instructions adapted under license by [facility] ([facility]). If you have questions about a medical condition or this instruction, always ask your healthcare professional. [place] [address] any warranty or liability for your use of this information.

## 2020-09-21 NOTE — PROGRESS NOTES
S:  Denies bleeding, spotting, leaking of fluid. Good fetal movement. Concerns today:  Generalized vulvar pain, peeling skin, copious greenish/yellow vaginal discharge.  C/o mild-moderate vaginal discomfort, described as sensitivity, irritation, tenderness   Associated symptoms:  Denies fever, headache, malaise, lymphadenopathy, myalgias. Denies dysuria, frequency, urgency.  Activities that aggravate:  None   Treatments tried:   None   Possible STD exposure within the last 90 days:  Yes, Use of barrier protection:  No    Review of Systems   Constitutional: Negative for activity change, appetite change and fever. Eyes: Negative for visual disturbance. Respiratory: Negative for shortness of breath. Gastrointestinal: Negative for abdominal pain, constipation, diarrhea, nausea and vomiting. Genitourinary: Positive for vaginal discharge. Negative for dysuria and vaginal bleeding. Neurological: Negative for headaches. O:    Fundal Height:  At umbilicus    Physical Exam  Constitutional:       General: She is not in acute distress. Appearance: Normal appearance. Cardiovascular:      Rate and Rhythm: Normal rate and regular rhythm. Pulmonary:      Effort: Pulmonary effort is normal. No respiratory distress. Abdominal:      Palpations: Abdomen is soft. Tenderness: There is no abdominal tenderness. Hernia: There is no hernia in the left inguinal area or right inguinal area. Genitourinary:     Labia:         Right: Tenderness present. No rash, lesion or injury. Left: Tenderness present. No rash, lesion or injury. Urethra: No prolapse, urethral pain, urethral swelling or urethral lesion. Vagina: No signs of injury and foreign body. Vaginal discharge (copious, yellow/green, watery and curdlike) present. No erythema, tenderness, bleeding, lesions or prolapsed vaginal walls.       Cervix: No cervical motion tenderness, discharge, friability, lesion, erythema, cervical bleeding or eversion. Uterus: Not tender. Rectum: No mass or external hemorrhoid. Musculoskeletal:         General: No swelling. Right lower leg: No edema. Left lower leg: No edema. Lymphadenopathy:      Lower Body: No right inguinal adenopathy. No left inguinal adenopathy. Skin:     General: Skin is warm and dry. Capillary Refill: Capillary refill takes less than 2 seconds. Neurological:      Mental Status: She is alert and oriented to person, place, and time. Mental status is at baseline. Sensory: No sensory deficit. Deep Tendon Reflexes: Reflexes normal.   Psychiatric:         Mood and Affect: Mood normal.         Behavior: Behavior normal.       A:  22 y.o. female  IUP at 19w2d    Patient Active Problem List    Diagnosis Date Noted    Candidal vaginitis 2020    Bacterial vaginitis 2020      Diagnosis Orders   1. Encounter for supervision of other normal pregnancy in second trimester     2. 19 weeks gestation of pregnancy     3. Vaginal discomfort  C.trachomatis N.gonorrhoeae DNA    Wet prep, genital   4. Candidal vaginitis  Miconazole Nitrate Applicator (MONISTAT 7 COMBO PACK BASILIO) 100 & 2 MG-% (9GM) KIT   5. Bacterial vaginitis  metroNIDAZOLE (FLAGYL) 500 MG tablet       P:     Return in 11 days (on 10/2/2020), or if symptoms worsen or fail to improve, for Prenatal Care Visit, [address] appointment tomorrow (20).     GENNARO Pringle CNM

## 2020-09-22 ENCOUNTER — HOSPITAL ENCOUNTER (OUTPATIENT)
Dept: ULTRASOUND IMAGING | Age: 20
Discharge: HOME | End: 2020-09-24
Payer: COMMERCIAL

## 2020-09-22 PROBLEM — N76.0 BACTERIAL VAGINITIS: Status: ACTIVE | Noted: 2020-09-22

## 2020-09-22 PROBLEM — B96.89 BACTERIAL VAGINITIS: Status: ACTIVE | Noted: 2020-09-22

## 2020-09-22 PROBLEM — B37.31 CANDIDAL VAGINITIS: Status: ACTIVE | Noted: 2020-09-22

## 2020-09-22 PROCEDURE — 76805 OB US >/= 14 WKS SNGL FETUS: CPT

## 2020-09-22 ASSESSMENT — ENCOUNTER SYMPTOMS
ABDOMINAL PAIN: 0
CONSTIPATION: 0
DIARRHEA: 0
NAUSEA: 0
SHORTNESS OF BREATH: 0
VOMITING: 0

## 2020-09-24 LAB
C TRACH DNA GENITAL QL NAA+PROBE: NEGATIVE
N. GONORRHOEAE DNA: NEGATIVE

## 2020-10-02 ENCOUNTER — ROUTINE PRENATAL (OUTPATIENT)
Dept: OBGYN CLINIC | Age: 20
End: 2020-10-02

## 2020-10-02 VITALS
WEIGHT: 134.4 LBS | BODY MASS INDEX: 23.07 KG/M2 | SYSTOLIC BLOOD PRESSURE: 100 MMHG | DIASTOLIC BLOOD PRESSURE: 64 MMHG | HEART RATE: 88 BPM

## 2020-10-02 PROCEDURE — 0502F SUBSEQUENT PRENATAL CARE: CPT | Performed by: OBSTETRICS & GYNECOLOGY

## 2020-10-20 RX ORDER — ASCORBIC ACID, CHOLECALCIFEROL, .ALPHA.-TOCOPHEROL ACETATE, DL-, PYRIDOXINE HYDROCHLORIDE, FOLIC ACID, CYANOCOBALAMIN, BIOTIN, CALCIUM CARBONATE, FERROUS ASPARTO GLYCINATE, IRON, POTASSIUM IODIDE, MAGNESIUM OXIDE, DOCONEXENT AND LOWBUSH BLUEBERRY 60; 1000; 10; 26; 400; 13; 280; 80; 9; 9; 150; 25; 350; 25; 600 MG/1; [IU]/1; [IU]/1; MG/1; UG/1; UG/1; UG/1; MG/1; MG/1; MG/1; UG/1; MG/1; MG/1; MG/1; UG/1
1 CAPSULE, GELATIN COATED ORAL DAILY
Qty: 30 CAPSULE | Refills: 5 | Status: SHIPPED | OUTPATIENT
Start: 2020-10-20 | End: 2021-10-29

## 2020-10-27 ENCOUNTER — ROUTINE PRENATAL (OUTPATIENT)
Dept: OBGYN CLINIC | Age: 20
End: 2020-10-27

## 2020-10-27 VITALS
DIASTOLIC BLOOD PRESSURE: 62 MMHG | HEART RATE: 109 BPM | SYSTOLIC BLOOD PRESSURE: 100 MMHG | WEIGHT: 142 LBS | BODY MASS INDEX: 24.37 KG/M2

## 2020-10-27 PROCEDURE — 0502F SUBSEQUENT PRENATAL CARE: CPT | Performed by: OBSTETRICS & GYNECOLOGY

## 2020-10-27 RX ORDER — VALACYCLOVIR HYDROCHLORIDE 500 MG/1
500 TABLET, FILM COATED ORAL DAILY
Qty: 30 TABLET | Refills: 3 | Status: SHIPPED | OUTPATIENT
Start: 2020-10-27 | End: 2021-10-26 | Stop reason: SDUPTHER

## 2020-10-28 NOTE — PROGRESS NOTES
Patient's last menstrual period was 05/09/2020. Please reference prenatal and OB flow chart for further information  PT here today for routine prenatal care  Pt endorses fetal movement and denies loss of fluid, contractions or vaginal bleeding  Pt without complaints  ROS:  Pt denies headache, dysuria, nausea/vomiting  PE:  /62   Pulse 109   Wt 142 lb (64.4 kg)   LMP 05/09/2020   BMI 24.37 kg/m²   Gen - Alert and oriented x 3  HEENT- NC/AT, CVS - RRR, Lungs - CTAB  Abd - FH Appropriate fetal growth  LE no edema  Reassuring fetal status at this time     Diagnosis Orders   1. Encounter for supervision of other normal pregnancy in second trimester  Glucose tolerance, 1 hour    Hemoglobin and Hematocrit, Blood   2. 24 weeks gestation of pregnancy  Glucose tolerance, 1 hour    Hemoglobin and Hematocrit, Blood       Upon completion of the visit all questions were answered and the patients follow-up and testing schedule were reviewed.

## 2020-11-03 ENCOUNTER — VIRTUAL VISIT (OUTPATIENT)
Dept: OBGYN CLINIC | Age: 20
End: 2020-11-03

## 2020-11-03 PROCEDURE — 0502F SUBSEQUENT PRENATAL CARE: CPT | Performed by: OBSTETRICS & GYNECOLOGY

## 2020-11-03 PROCEDURE — G8428 CUR MEDS NOT DOCUMENT: HCPCS | Performed by: OBSTETRICS & GYNECOLOGY

## 2020-11-03 NOTE — PROGRESS NOTES
11/3/2020    TELEHEALTH EVALUATION -- Audio/Visual (During [facility]-71 public health emergency)    HPI:    Shyam Saucedo (:  2000) has requested an audio/video evaluation for the following concern(s):    Patient appears to be in her care dr in office. She is very concerned about recent diagnosis of bells palsy. She was prescribed medrol dose pack and has not used yet because of concern and she wanted to the ok to start    Review of Systems    Prior to Visit Medications    Medication Sig Taking? Authorizing Provider   valACYclovir (VALTREX) 500 MG tablet Take 1 tablet by mouth daily  Yessi Adam, DO   Rdhstd-FwZjl-KdEcp-Meth-FA-DHA (PRENATE MINI) 18-0.6-0.4-350 MG CAPS Take 1 tablet by mouth daily  Yulisa Chun DO   Miconazole Nitrate Applicator (MONISTAT 7 COMBO PACK BASILIO) 100 & 2 MG-% (9GM) KIT Insert 1 applicator-full into the vagina each night at bedtime for 7 nights. Apply external cream as needed to relieve itching and discomfort.   Bill Escobar, APRN - CNM       Social History     Tobacco Use    Smoking status: Current Every Day Smoker     Packs/day: 0.50     Years: 1.00     Pack years: 0.50     Types: Cigarettes    Smokeless tobacco: Never Used   Substance Use Topics    Alcohol use: Not Currently     Alcohol/week: 0.0 standard drinks    Drug use: No        Allergies   Allergen Reactions    Pcn [Penicillins]    , No past medical history on file., No past surgical history on file.,   Social History     Tobacco Use    Smoking status: Current Every Day Smoker     Packs/day: 0.50     Years: 1.00     Pack years: 0.50     Types: Cigarettes    Smokeless tobacco: Never Used   Substance Use Topics    Alcohol use: Not Currently     Alcohol/week: 0.0 standard drinks    Drug use: No   ,   Family History   Problem Relation Age of Onset    Cancer Sister         leukemia       PHYSICAL EXAMINATION:  [ INSTRUCTIONS:  \"[x]\" Indicates a positive item  \"[]\" Indicates a negative item  -- DELETE ALL ITEMS NOT EXAMINED]  Vital Signs: (As obtained by patient/caregiver or practitioner observation)    Blood pressure-  Heart rate-    Respiratory rate-    Temperature-  Pulse oximetry-     Constitutional: [x] Appears well-developed and well-nourished [x] No apparent distress      [] Abnormal-   Mental status  [x] Alert and awake  [x] Oriented to person/place/time []Able to follow commands      Eyes:  EOM    []  Normal  [] Abnormal-  Sclera  []  Normal  [] Abnormal -         Discharge []  None visible  [] Abnormal -    HENT:   [x] Normocephalic, atraumatic. [] Abnormal   [] Mouth/Throat: Mucous membranes are moist.     External Ears [] Normal  [] Abnormal-     Neck: [] No visualized mass     Pulmonary/Chest: [] Respiratory effort normal.  [] No visualized signs of difficulty breathing or respiratory distress        [] Abnormal-      Musculoskeletal:   [] Normal gait with no signs of ataxia         [] Normal range of motion of neck        [] Abnormal-       Neurological:        [] No Facial Asymmetry (Cranial nerve 7 motor function) (limited exam to video visit)          [] No gaze palsy        [x] Abnormal-  Decrease in smile line and facial palsy noted        Skin:        [x] No significant exanthematous lesions or discoloration noted on facial skin         [] Abnormal-            Psychiatric:       [x] Normal Affect [] No Hallucinations        [] Abnormal-     Other pertinent observable physical exam findings-     ASSESSMENT/PLAN:    1. Bell's palsy affecting pregnancy in third trimester        Return in about 4 weeks (around 12/1/2020) for postpartum. Shyam Saucedo is a 23 y.o. female being evaluated by a Virtual Visit (video visit) encounter to address concerns as mentioned above. A caregiver was present when appropriate.  Due to this being a TeleHealth encounter ([facility]), evaluation of the following organ systems was limited: Vitals/Constitutional/EENT/Resp/CV/GI//MS/Neuro/Skin/Heme-Lymph-Imm. Pursuant to the emergency declaration under the [address] [place] [address], [address] [place] [address] and the [employer] Act, this Virtual Visit was conducted with patient's (and/or legal guardian's) consent, to reduce the patient's risk of exposure to COVID-19 and provide necessary medical care. The patient (and/or legal guardian) has also been advised to contact this office for worsening conditions or problems, and seek emergency medical treatment and/or call 911 if deemed necessary. Patient identification was verified at the start of the visit: Yes    Total time spent on this encounter: Not billed by time    Services were provided through a video synchronous discussion virtually to substitute for in-person clinic visit. Patient and provider were located at their individual homes. --Monisha Miramontes DO on 11/8/2020 at 11:37 PM    An electronic signature was used to authenticate this note.

## 2020-11-13 ENCOUNTER — NURSE ONLY (OUTPATIENT)
Dept: OBGYN CLINIC | Age: 20
End: 2020-11-13
Payer: MEDICARE

## 2020-11-13 DIAGNOSIS — Z3A.24 24 WEEKS GESTATION OF PREGNANCY: ICD-10-CM

## 2020-11-13 DIAGNOSIS — Z34.82 ENCOUNTER FOR SUPERVISION OF OTHER NORMAL PREGNANCY IN SECOND TRIMESTER: ICD-10-CM

## 2020-11-13 LAB
GLUCOSE, 1HR PP: 96 MG/DL (ref 60–140)
HCT VFR BLD CALC: 34.1 % (ref 37–47)
HEMOGLOBIN: 11.3 G/DL (ref 12–16)

## 2020-11-13 PROCEDURE — 36415 COLL VENOUS BLD VENIPUNCTURE: CPT | Performed by: OBSTETRICS & GYNECOLOGY

## 2020-11-24 ENCOUNTER — ROUTINE PRENATAL (OUTPATIENT)
Dept: OBGYN CLINIC | Age: 20
End: 2020-11-24

## 2020-11-24 VITALS
SYSTOLIC BLOOD PRESSURE: 100 MMHG | HEART RATE: 110 BPM | BODY MASS INDEX: 26.61 KG/M2 | DIASTOLIC BLOOD PRESSURE: 60 MMHG | WEIGHT: 155 LBS

## 2020-11-24 PROCEDURE — G8484 FLU IMMUNIZE NO ADMIN: HCPCS | Performed by: OBSTETRICS & GYNECOLOGY

## 2020-11-24 PROCEDURE — 4004F PT TOBACCO SCREEN RCVD TLK: CPT | Performed by: OBSTETRICS & GYNECOLOGY

## 2020-11-24 PROCEDURE — 0502F SUBSEQUENT PRENATAL CARE: CPT | Performed by: OBSTETRICS & GYNECOLOGY

## 2020-11-24 PROCEDURE — G8419 CALC BMI OUT NRM PARAM NOF/U: HCPCS | Performed by: OBSTETRICS & GYNECOLOGY

## 2020-11-24 PROCEDURE — G8427 DOCREV CUR MEDS BY ELIG CLIN: HCPCS | Performed by: OBSTETRICS & GYNECOLOGY

## 2020-11-24 NOTE — PROGRESS NOTES
Patient's last menstrual period was 05/09/2020. Please reference prenatal and OB flow chart for further information  PT here today for routine prenatal care  Pt endorses fetal movement and denies loss of fluid, contractions or vaginal bleeding  Pt without complaints  ROS:  Pt denies headache, dysuria, nausea/vomiting  PE:  /60   Pulse 110   Wt 155 lb (70.3 kg)   LMP 05/09/2020   BMI 26.61 kg/m²   Gen - Alert and oriented x 3  HEENT- NC/AT, CVS - RRR, Lungs - CTAB  Abd - FH Appropriate fetal growth  LE no edema  Reassuring fetal status at this time     Diagnosis Orders   1. Encounter for supervision of normal first pregnancy in third trimester     2. Positive test for herpes simplex virus (HSV) antibody     3. 28 weeks gestation of pregnancy     4. First degree ankle sprain, right, initial encounter         Upon completion of the visit all questions were answered and the patients follow-up and testing schedule were reviewed.

## 2020-11-30 ENCOUNTER — TELEPHONE (OUTPATIENT)
Dept: OBGYN CLINIC | Age: 20
End: 2020-11-30

## 2020-12-16 ENCOUNTER — ROUTINE PRENATAL (OUTPATIENT)
Dept: OBGYN CLINIC | Age: 20
End: 2020-12-16

## 2020-12-16 VITALS
DIASTOLIC BLOOD PRESSURE: 68 MMHG | BODY MASS INDEX: 27.46 KG/M2 | HEART RATE: 116 BPM | SYSTOLIC BLOOD PRESSURE: 110 MMHG | WEIGHT: 160 LBS

## 2020-12-16 DIAGNOSIS — N89.8 VAGINAL DISCHARGE: ICD-10-CM

## 2020-12-16 PROCEDURE — G8484 FLU IMMUNIZE NO ADMIN: HCPCS | Performed by: OBSTETRICS & GYNECOLOGY

## 2020-12-16 PROCEDURE — 0502F SUBSEQUENT PRENATAL CARE: CPT | Performed by: OBSTETRICS & GYNECOLOGY

## 2020-12-16 PROCEDURE — G8419 CALC BMI OUT NRM PARAM NOF/U: HCPCS | Performed by: OBSTETRICS & GYNECOLOGY

## 2020-12-16 PROCEDURE — G8428 CUR MEDS NOT DOCUMENT: HCPCS | Performed by: OBSTETRICS & GYNECOLOGY

## 2020-12-16 PROCEDURE — 4004F PT TOBACCO SCREEN RCVD TLK: CPT | Performed by: OBSTETRICS & GYNECOLOGY

## 2020-12-16 NOTE — PROGRESS NOTES
Patient's last menstrual period was 05/09/2020. Please reference prenatal and OB flow chart for further information  PT here today for routine prenatal care  Pt endorses fetal movement and denies loss of fluid, contractions or vaginal bleeding  Pt without complaints  ROS:  Pt denies headache, dysuria, nausea/vomiting  PE:  /68   Pulse 116   Wt 160 lb (72.6 kg)   LMP 05/09/2020   BMI 27.46 kg/m²   Gen - Alert and oriented x 3  HEENT- NC/AT, CVS - RRR, Lungs - CTAB  Abd - FH Appropriate fetal growth  LE no edema  Reassuring fetal status at this time     Diagnosis Orders   1. Encounter for supervision of other normal pregnancy in third trimester     2. 31 weeks gestation of pregnancy         Upon completion of the visit all questions were answered and the patients follow-up and testing schedule were reviewed.   Normal vaginal discharge will obtain wet prep

## 2020-12-30 ENCOUNTER — ROUTINE PRENATAL (OUTPATIENT)
Dept: OBGYN CLINIC | Age: 20
End: 2020-12-30

## 2020-12-30 VITALS
WEIGHT: 163 LBS | DIASTOLIC BLOOD PRESSURE: 80 MMHG | BODY MASS INDEX: 27.98 KG/M2 | HEART RATE: 98 BPM | SYSTOLIC BLOOD PRESSURE: 110 MMHG

## 2020-12-30 PROCEDURE — G8484 FLU IMMUNIZE NO ADMIN: HCPCS | Performed by: OBSTETRICS & GYNECOLOGY

## 2020-12-30 PROCEDURE — 4004F PT TOBACCO SCREEN RCVD TLK: CPT | Performed by: OBSTETRICS & GYNECOLOGY

## 2020-12-30 PROCEDURE — G8427 DOCREV CUR MEDS BY ELIG CLIN: HCPCS | Performed by: OBSTETRICS & GYNECOLOGY

## 2020-12-30 PROCEDURE — 0502F SUBSEQUENT PRENATAL CARE: CPT | Performed by: OBSTETRICS & GYNECOLOGY

## 2020-12-30 PROCEDURE — G8419 CALC BMI OUT NRM PARAM NOF/U: HCPCS | Performed by: OBSTETRICS & GYNECOLOGY

## 2021-01-07 ENCOUNTER — HOSPITAL ENCOUNTER (OUTPATIENT)
Age: 21
Discharge: HOME | End: 2021-01-07
Attending: OBSTETRICS & GYNECOLOGY | Admitting: OBSTETRICS & GYNECOLOGY
Payer: COMMERCIAL

## 2021-01-07 ENCOUNTER — APPOINTMENT (OUTPATIENT)
Dept: ULTRASOUND IMAGING | Age: 21
End: 2021-01-07
Payer: COMMERCIAL

## 2021-01-07 VITALS
HEIGHT: 65 IN | BODY MASS INDEX: 27.16 KG/M2 | SYSTOLIC BLOOD PRESSURE: 109 MMHG | HEART RATE: 102 BPM | OXYGEN SATURATION: 99 % | WEIGHT: 163 LBS | RESPIRATION RATE: 18 BRPM | TEMPERATURE: 99 F | DIASTOLIC BLOOD PRESSURE: 68 MMHG

## 2021-01-07 LAB
ALBUMIN SERPL-MCNC: 3.5 G/DL (ref 3.5–4.6)
ALP BLD-CCNC: 169 U/L (ref 40–130)
ALT SERPL-CCNC: 7 U/L (ref 0–33)
AMPHETAMINE SCREEN, URINE: NORMAL
ANION GAP SERPL CALCULATED.3IONS-SCNC: 12 MEQ/L (ref 9–15)
AST SERPL-CCNC: 12 U/L (ref 0–35)
BACTERIA: ABNORMAL /HPF
BARBITURATE SCREEN URINE: NORMAL
BASOPHILS ABSOLUTE: 0 K/UL (ref 0–0.2)
BASOPHILS RELATIVE PERCENT: 0.2 %
BENZODIAZEPINE SCREEN, URINE: NORMAL
BILIRUB SERPL-MCNC: <0.2 MG/DL (ref 0.2–0.7)
BILIRUBIN URINE: NEGATIVE
BLOOD, URINE: NEGATIVE
BUN BLDV-MCNC: 6 MG/DL (ref 6–20)
CALCIUM SERPL-MCNC: 9.1 MG/DL (ref 8.5–9.9)
CANNABINOID SCREEN URINE: NORMAL
CHLORIDE BLD-SCNC: 104 MEQ/L (ref 95–107)
CLARITY: CLEAR
CLUE CELLS: NORMAL
CO2: 21 MEQ/L (ref 20–31)
COCAINE METABOLITE SCREEN URINE: NORMAL
COLOR: YELLOW
CREAT SERPL-MCNC: 0.41 MG/DL (ref 0.5–0.9)
EOSINOPHILS ABSOLUTE: 0.2 K/UL (ref 0–0.7)
EOSINOPHILS RELATIVE PERCENT: 1.9 %
EPITHELIAL CELLS, UA: ABNORMAL /HPF (ref 0–5)
FETAL FIBRONECTIN: NEGATIVE
GFR AFRICAN AMERICAN: >60
GFR NON-AFRICAN AMERICAN: >60
GLOBULIN: 2.6 G/DL (ref 2.3–3.5)
GLUCOSE BLD-MCNC: 86 MG/DL (ref 70–99)
GLUCOSE URINE: NEGATIVE MG/DL
HCT VFR BLD CALC: 32.2 % (ref 37–47)
HEMOGLOBIN: 10.8 G/DL (ref 12–16)
HYALINE CASTS: ABNORMAL /HPF (ref 0–5)
KETONES, URINE: NEGATIVE MG/DL
LEUKOCYTE ESTERASE, URINE: ABNORMAL
LYMPHOCYTES ABSOLUTE: 2.1 K/UL (ref 1–4.8)
LYMPHOCYTES RELATIVE PERCENT: 19.6 %
Lab: NORMAL
MCH RBC QN AUTO: 31.8 PG (ref 27–31.3)
MCHC RBC AUTO-ENTMCNC: 33.5 % (ref 33–37)
MCV RBC AUTO: 94.7 FL (ref 82–100)
METHADONE SCREEN, URINE: NORMAL
MONOCYTES ABSOLUTE: 0.9 K/UL (ref 0.2–0.8)
MONOCYTES RELATIVE PERCENT: 8.1 %
NEUTROPHILS ABSOLUTE: 7.6 K/UL (ref 1.4–6.5)
NEUTROPHILS RELATIVE PERCENT: 70.2 %
NITRITE, URINE: NEGATIVE
OPIATE SCREEN URINE: NORMAL
OXYCODONE URINE: NORMAL
PDW BLD-RTO: 13.3 % (ref 11.5–14.5)
PH UA: 7 (ref 5–9)
PHENCYCLIDINE SCREEN URINE: NORMAL
PLACENTA ALPHA MICROGLOBULIN-1: NEGATIVE
PLATELET # BLD: 276 K/UL (ref 130–400)
POTASSIUM SERPL-SCNC: 3.9 MEQ/L (ref 3.4–4.9)
PROPOXYPHENE SCREEN: NORMAL
PROTEIN UA: NEGATIVE MG/DL
RBC # BLD: 3.4 M/UL (ref 4.2–5.4)
RBC UA: ABNORMAL /HPF (ref 0–5)
SODIUM BLD-SCNC: 137 MEQ/L (ref 135–144)
SPECIFIC GRAVITY UA: 1.01 (ref 1–1.03)
TOTAL PROTEIN: 6.1 G/DL (ref 6.3–8)
TRICHOMONAS PREP: NORMAL
TRICHOMONAS VAGINALIS SCREEN: NEGATIVE
UROBILINOGEN, URINE: 0.2 E.U./DL
WBC # BLD: 10.9 K/UL (ref 4.8–10.8)
WBC UA: ABNORMAL /HPF (ref 0–5)
YEAST WET PREP: NORMAL

## 2021-01-07 PROCEDURE — 87210 SMEAR WET MOUNT SALINE/INK: CPT

## 2021-01-07 PROCEDURE — 99282 EMERGENCY DEPT VISIT SF MDM: CPT | Performed by: OBSTETRICS & GYNECOLOGY

## 2021-01-07 PROCEDURE — 81001 URINALYSIS AUTO W/SCOPE: CPT

## 2021-01-07 PROCEDURE — 76815 OB US LIMITED FETUS(S): CPT

## 2021-01-07 PROCEDURE — 87491 CHLMYD TRACH DNA AMP PROBE: CPT

## 2021-01-07 PROCEDURE — 84112 EVAL AMNIOTIC FLUID PROTEIN: CPT

## 2021-01-07 PROCEDURE — 36415 COLL VENOUS BLD VENIPUNCTURE: CPT

## 2021-01-07 PROCEDURE — 80053 COMPREHEN METABOLIC PANEL: CPT

## 2021-01-07 PROCEDURE — 87591 N.GONORRHOEAE DNA AMP PROB: CPT

## 2021-01-07 PROCEDURE — 80307 DRUG TEST PRSMV CHEM ANLYZR: CPT

## 2021-01-07 PROCEDURE — 59025 FETAL NON-STRESS TEST: CPT

## 2021-01-07 PROCEDURE — 87808 TRICHOMONAS ASSAY W/OPTIC: CPT

## 2021-01-07 PROCEDURE — 87086 URINE CULTURE/COLONY COUNT: CPT

## 2021-01-07 PROCEDURE — 87070 CULTURE OTHR SPECIMN AEROBIC: CPT

## 2021-01-07 PROCEDURE — 82731 ASSAY OF FETAL FIBRONECTIN: CPT

## 2021-01-07 PROCEDURE — 76817 TRANSVAGINAL US OBSTETRIC: CPT

## 2021-01-07 PROCEDURE — 99283 EMERGENCY DEPT VISIT LOW MDM: CPT

## 2021-01-07 PROCEDURE — 85025 COMPLETE CBC W/AUTO DIFF WBC: CPT

## 2021-01-07 RX ORDER — SODIUM CHLORIDE 0.9 % (FLUSH) 0.9 %
10 SYRINGE (ML) INJECTION PRN
Status: DISCONTINUED | OUTPATIENT
Start: 2021-01-07 | End: 2021-01-07 | Stop reason: HOSPADM

## 2021-01-07 RX ORDER — ONDANSETRON 2 MG/ML
4 INJECTION INTRAMUSCULAR; INTRAVENOUS EVERY 6 HOURS PRN
Status: DISCONTINUED | OUTPATIENT
Start: 2021-01-07 | End: 2021-01-07 | Stop reason: HOSPADM

## 2021-01-07 RX ORDER — ACETAMINOPHEN 325 MG/1
650 TABLET ORAL EVERY 4 HOURS PRN
Status: DISCONTINUED | OUTPATIENT
Start: 2021-01-07 | End: 2021-01-07 | Stop reason: HOSPADM

## 2021-01-07 RX ORDER — SODIUM CHLORIDE 0.9 % (FLUSH) 0.9 %
10 SYRINGE (ML) INJECTION EVERY 12 HOURS SCHEDULED
Status: DISCONTINUED | OUTPATIENT
Start: 2021-01-07 | End: 2021-01-07 | Stop reason: HOSPADM

## 2021-01-07 RX ORDER — PROMETHAZINE HYDROCHLORIDE 12.5 MG/1
12.5 TABLET ORAL EVERY 6 HOURS PRN
Status: DISCONTINUED | OUTPATIENT
Start: 2021-01-07 | End: 2021-01-07 | Stop reason: HOSPADM

## 2021-01-07 NOTE — PROGRESS NOTES
RENAN Merchant at bedside discussing, labs and u/s all wnl. No rupture of membranes. Pt may go home. To f/u with dr Diana Garcia as scheduled.

## 2021-01-07 NOTE — ED TRIAGE NOTES
Department of Obstetrics and Gynecology  Labor and Delivery   Triage Note      Pt Name: Leopoldo Carroll  MRN: [id number] Ira #: [de-identified]  YOB: 2000  Procedure Performed By: Carlos Lux MD        SUBJECTIVE:  22 y/o [id number] female whose GIANNA was 21; now 34w5d gest and presents with c/o increased white vag d/c from earlier this AM; last sex was 3 wks ago; prior ob hx/o SAB; seeing Dr. Remberto Mcdonough for [facility]; last ob sono was 20 at 18 wks gest which raised issue of left ventricular fetal cardiac focus ( ? B9 variant ); scheduled for f/u ob sono per ob/gyn this coming Wednesday. ROS-no dysuria, vag bleeding; had + HSV IgG Ab test in 10/20 and states she has no recollection for prior genital outbreak; was started on valtrex suppression at that time per ob/gyn    OBJECTIVE  She is at 34+ weeks gestation   Vitals:  /68   Pulse 102   Temp 99 °F (37.2 °C) (Oral)   Resp 18   Ht 5' 5\" (1.651 m)   Wt 163 lb (73.9 kg)   LMP 2020   SpO2 99%   BMI 27.12 kg/m²     CONSTITUTIONAL:  awake, alert, cooperative, no apparent distress, and appears stated age  ABDOMEN:  Uterus gravid to 34 wks gest ( supple ). GENITAL/URINARY:  No labial lesions. Speculum: heavy white d/c of pregnancy; no clinical sign of ROM or vaginitis ( FFN, wet prep, cx & vag cultures-done )        Cervix:             Dilation:  Closed         Effacement:  Long         Station:  -2 cm         Consistency:  medium         Position:  mid    Fetal Position:  Unknown. Membranes:  Intact ( amnisure-neg ). NST is reactive    Fetal heart rate:         Baseline Heart Rate:  135. Accelerations:  present       Decelerations:  none       Variability:  moderate    Contraction frequency: irritable pattern.          ASSESSMENT & PLAN:    IUP at 34+ wks gest, ; c/o vag leaking ( neg amnisure ); r/o PTL/ uti/ vaginitis; will check cbc, ua and comp panel; will check f/u ob sono to confirm interval growth, GUILLERMO and cx length; will offer clear liquids and tylenol; labs ok, including neg FFN and wet prep; ob sono shows est GA of 35w3d gest, EFW-5#14oz, GUILLERMO-15 cm and cx length of 3.2 cm; no fetal anomalies seen; patient has f/u appt with ob/gyn this coming Wednesday; advise patient to have MA check on vag & cx and urine cultures at that time; report any further concerns.     Marco Urena MD

## 2021-01-07 NOTE — FLOWSHEET NOTE
States she thinks her water broke at 1000 am. . States it was all over her thighs. States it was clear. Order received for [facility] and urinalysis. Urine and amnisure obtained and sent to lab.  Lab notified specimen was on its way down

## 2021-01-09 LAB
GENITAL CULTURE, ROUTINE: NORMAL
URINE CULTURE, ROUTINE: NORMAL

## 2021-01-12 LAB
C TRACH DNA GENITAL QL NAA+PROBE: NEGATIVE
N. GONORRHOEAE DNA: NEGATIVE

## 2021-01-13 ENCOUNTER — ROUTINE PRENATAL (OUTPATIENT)
Dept: OBGYN CLINIC | Age: 21
End: 2021-01-13

## 2021-01-13 VITALS
SYSTOLIC BLOOD PRESSURE: 100 MMHG | WEIGHT: 168 LBS | HEART RATE: 101 BPM | DIASTOLIC BLOOD PRESSURE: 60 MMHG | BODY MASS INDEX: 27.96 KG/M2

## 2021-01-13 DIAGNOSIS — Z34.83 ENCOUNTER FOR SUPERVISION OF OTHER NORMAL PREGNANCY IN THIRD TRIMESTER: Primary | ICD-10-CM

## 2021-01-13 DIAGNOSIS — Z3A.35 35 WEEKS GESTATION OF PREGNANCY: ICD-10-CM

## 2021-01-13 DIAGNOSIS — Z01.818 PREOP EXAMINATION: ICD-10-CM

## 2021-01-13 DIAGNOSIS — Z34.83 ENCOUNTER FOR SUPERVISION OF OTHER NORMAL PREGNANCY IN THIRD TRIMESTER: ICD-10-CM

## 2021-01-13 PROCEDURE — 0502F SUBSEQUENT PRENATAL CARE: CPT | Performed by: OBSTETRICS & GYNECOLOGY

## 2021-01-13 PROCEDURE — G8419 CALC BMI OUT NRM PARAM NOF/U: HCPCS | Performed by: OBSTETRICS & GYNECOLOGY

## 2021-01-13 PROCEDURE — 4004F PT TOBACCO SCREEN RCVD TLK: CPT | Performed by: OBSTETRICS & GYNECOLOGY

## 2021-01-13 PROCEDURE — G8427 DOCREV CUR MEDS BY ELIG CLIN: HCPCS | Performed by: OBSTETRICS & GYNECOLOGY

## 2021-01-13 PROCEDURE — G8484 FLU IMMUNIZE NO ADMIN: HCPCS | Performed by: OBSTETRICS & GYNECOLOGY

## 2021-01-13 NOTE — PROGRESS NOTES
Patient's last menstrual period was 05/09/2020. Please reference prenatal and OB flow chart for further information  PT here today for routine prenatal care  Pt endorses fetal movement and denies loss of fluid, contractions or vaginal bleeding  Pt without complaints  ROS:  Pt denies headache, dysuria, nausea/vomiting  PE:  /60   Pulse 101   Wt 168 lb (76.2 kg)   LMP 05/09/2020   BMI 27.96 kg/m²   Gen - Alert and oriented x 3  HEENT- NC/AT, CVS - RRR, Lungs - CTAB  Abd - FH Appropriate fetal growth  LE no edema  Reassuring fetal status at this time     Diagnosis Orders   1. Encounter for supervision of other normal pregnancy in third trimester     2. 35 weeks gestation of pregnancy     3. Preop examination  Covid-19 Ambulatory       Upon completion of the visit all questions were answered and the patients follow-up and testing schedule were reviewed.

## 2021-01-16 LAB — GROUP B STREP CULTURE: NORMAL

## 2021-01-26 ENCOUNTER — ROUTINE PRENATAL (OUTPATIENT)
Dept: OBGYN CLINIC | Age: 21
End: 2021-01-26

## 2021-01-26 VITALS
WEIGHT: 172 LBS | HEART RATE: 75 BPM | BODY MASS INDEX: 28.62 KG/M2 | DIASTOLIC BLOOD PRESSURE: 62 MMHG | SYSTOLIC BLOOD PRESSURE: 102 MMHG

## 2021-01-26 DIAGNOSIS — Z34.03 SUPERVISION OF NORMAL FIRST PREGNANCY IN THIRD TRIMESTER: Primary | ICD-10-CM

## 2021-01-26 DIAGNOSIS — Z3A.37 37 WEEKS GESTATION OF PREGNANCY: ICD-10-CM

## 2021-01-26 PROCEDURE — G8419 CALC BMI OUT NRM PARAM NOF/U: HCPCS | Performed by: OBSTETRICS & GYNECOLOGY

## 2021-01-26 PROCEDURE — 4004F PT TOBACCO SCREEN RCVD TLK: CPT | Performed by: OBSTETRICS & GYNECOLOGY

## 2021-01-26 PROCEDURE — G8484 FLU IMMUNIZE NO ADMIN: HCPCS | Performed by: OBSTETRICS & GYNECOLOGY

## 2021-01-26 PROCEDURE — 0502F SUBSEQUENT PRENATAL CARE: CPT | Performed by: OBSTETRICS & GYNECOLOGY

## 2021-01-26 PROCEDURE — G8427 DOCREV CUR MEDS BY ELIG CLIN: HCPCS | Performed by: OBSTETRICS & GYNECOLOGY

## 2021-01-26 NOTE — PROGRESS NOTES
Patient's last menstrual period was 05/09/2020. Please reference prenatal and OB flow chart for further information  PT here today for routine prenatal care  Pt endorses fetal movement and denies loss of fluid, contractions or vaginal bleeding  Pt without complaints  ROS:  Pt denies headache, dysuria, nausea/vomiting  PE:  /62   Pulse 75   Wt 172 lb (78 kg)   LMP 05/09/2020   BMI 28.62 kg/m²   Gen - Alert and oriented x 3  HEENT- NC/AT, CVS - RRR, Lungs - CTAB  Abd - FH Appropriate fetal growth  LE no edema  Reassuring fetal status at this time     Diagnosis Orders   1. Supervision of normal first pregnancy in third trimester     2. 37 weeks gestation of pregnancy         Upon completion of the visit all questions were answered and the patients follow-up and testing schedule were reviewed.

## 2021-02-02 ENCOUNTER — ROUTINE PRENATAL (OUTPATIENT)
Dept: OBGYN CLINIC | Age: 21
End: 2021-02-02

## 2021-02-02 VITALS
SYSTOLIC BLOOD PRESSURE: 100 MMHG | HEART RATE: 81 BPM | BODY MASS INDEX: 28.29 KG/M2 | DIASTOLIC BLOOD PRESSURE: 68 MMHG | WEIGHT: 170 LBS

## 2021-02-02 DIAGNOSIS — Z3A.38 38 WEEKS GESTATION OF PREGNANCY: ICD-10-CM

## 2021-02-02 DIAGNOSIS — Z34.83 ENCOUNTER FOR SUPERVISION OF OTHER NORMAL PREGNANCY IN THIRD TRIMESTER: Primary | ICD-10-CM

## 2021-02-02 PROCEDURE — 0502F SUBSEQUENT PRENATAL CARE: CPT | Performed by: OBSTETRICS & GYNECOLOGY

## 2021-02-02 PROCEDURE — 4004F PT TOBACCO SCREEN RCVD TLK: CPT | Performed by: OBSTETRICS & GYNECOLOGY

## 2021-02-02 PROCEDURE — G8484 FLU IMMUNIZE NO ADMIN: HCPCS | Performed by: OBSTETRICS & GYNECOLOGY

## 2021-02-02 PROCEDURE — G8419 CALC BMI OUT NRM PARAM NOF/U: HCPCS | Performed by: OBSTETRICS & GYNECOLOGY

## 2021-02-02 PROCEDURE — G8427 DOCREV CUR MEDS BY ELIG CLIN: HCPCS | Performed by: OBSTETRICS & GYNECOLOGY

## 2021-02-02 NOTE — PROGRESS NOTES
Patient's last menstrual period was 05/09/2020. Please reference prenatal and OB flow chart for further information  PT here today for routine prenatal care  Pt endorses fetal movement and denies loss of fluid, contractions or vaginal bleeding  Pt without complaints  ROS:  Pt denies headache, dysuria, nausea/vomiting  PE:  /68   Pulse 81   Wt 170 lb (77.1 kg)   LMP 05/09/2020   BMI 28.29 kg/m²   Gen - Alert and oriented x 3  HEENT- NC/AT, CVS - RRR, Lungs - CTAB  Abd - FH Appropriate fetal growth  LE no edema  Reassuring fetal status at this time     Diagnosis Orders   1. Encounter for supervision of other normal pregnancy in third trimester     2. 38 weeks gestation of pregnancy         Upon completion of the visit all questions were answered and the patients follow-up and testing schedule were reviewed.

## 2021-02-03 ENCOUNTER — APPOINTMENT (OUTPATIENT)
Dept: ULTRASOUND IMAGING | Age: 21
End: 2021-02-03
Payer: COMMERCIAL

## 2021-02-03 ENCOUNTER — HOSPITAL ENCOUNTER (OUTPATIENT)
Age: 21
Discharge: HOME | End: 2021-02-03
Attending: OBSTETRICS & GYNECOLOGY | Admitting: OBSTETRICS & GYNECOLOGY
Payer: COMMERCIAL

## 2021-02-03 ENCOUNTER — TELEPHONE (OUTPATIENT)
Dept: OBGYN CLINIC | Age: 21
End: 2021-02-03

## 2021-02-03 VITALS
DIASTOLIC BLOOD PRESSURE: 72 MMHG | RESPIRATION RATE: 20 BRPM | HEART RATE: 92 BPM | SYSTOLIC BLOOD PRESSURE: 116 MMHG | WEIGHT: 170 LBS | BODY MASS INDEX: 29.02 KG/M2 | TEMPERATURE: 98.3 F | HEIGHT: 64 IN

## 2021-02-03 LAB
AMPHETAMINE SCREEN, URINE: NORMAL
BACTERIA: ABNORMAL /HPF
BARBITURATE SCREEN URINE: NORMAL
BENZODIAZEPINE SCREEN, URINE: NORMAL
BILIRUBIN URINE: NEGATIVE
BLOOD, URINE: ABNORMAL
CANNABINOID SCREEN URINE: NORMAL
CLARITY: ABNORMAL
COCAINE METABOLITE SCREEN URINE: NORMAL
COLOR: YELLOW
EPITHELIAL CELLS, UA: ABNORMAL /HPF (ref 0–5)
GLUCOSE URINE: NEGATIVE MG/DL
HYALINE CASTS: ABNORMAL /HPF (ref 0–5)
KETONES, URINE: NEGATIVE MG/DL
LEUKOCYTE ESTERASE, URINE: ABNORMAL
Lab: NORMAL
METHADONE SCREEN, URINE: NORMAL
NITRITE, URINE: NEGATIVE
OPIATE SCREEN URINE: NORMAL
OXYCODONE URINE: NORMAL
PH UA: 7 (ref 5–9)
PHENCYCLIDINE SCREEN URINE: NORMAL
PROPOXYPHENE SCREEN: NORMAL
PROTEIN UA: NEGATIVE MG/DL
RBC UA: ABNORMAL /HPF (ref 0–5)
SPECIFIC GRAVITY UA: 1.01 (ref 1–1.03)
UROBILINOGEN, URINE: 0.2 E.U./DL
WBC UA: ABNORMAL /HPF (ref 0–5)

## 2021-02-03 PROCEDURE — 99282 EMERGENCY DEPT VISIT SF MDM: CPT | Performed by: OBSTETRICS & GYNECOLOGY

## 2021-02-03 PROCEDURE — 81001 URINALYSIS AUTO W/SCOPE: CPT

## 2021-02-03 PROCEDURE — 99283 EMERGENCY DEPT VISIT LOW MDM: CPT

## 2021-02-03 PROCEDURE — 76815 OB US LIMITED FETUS(S): CPT

## 2021-02-03 PROCEDURE — 80307 DRUG TEST PRSMV CHEM ANLYZR: CPT

## 2021-02-03 NOTE — FLOWSHEET NOTE
Dr Gama Schuster at bedside speaking with pt regarding us findings and plan of care-understanding verbalized by pt.

## 2021-02-03 NOTE — ED TRIAGE NOTES
OB/ER Note:  SUBJECTIVE:  22 y/o  female whose GIANNA was 21; now 38w4d gest and here with concern of passage of mucous plug ( blood tinged ) after being checked in Dr. Jeanne Jean-Baptiste office yesterday ( told cx beginning to efface, but no dilation ); blood type-O +; had ob sono on 21 which showed good interval growth and normal GUILLERMO; placenta anterior and described as grade 3 with calcifications. ROS-no dysuria and SROM. OBJECTIVE:  /72   Pulse 92   Temp 98.3 °F (36.8 °C) (Oral)   Resp 20   Ht 5' 4\" (1.626 m)   Wt 170 lb (77.1 kg)   LMP 2020   BMI 29.18 kg/m²   NST-reactive. Uterus-gravid to 38 wks gest ( supple ). CX-closed/ 70%/ -2/ vtx; vag d/c slate grey with slight brown streak ( no active bleeding ). Labs-ua appears contaminated ( no sign of uti on microscopic ). Limited ob sono: good interval growth to 38 wks gest with EFW of 3300 gm and GUILLERMO of 12 cm; placenta again shows grade 3 sign with calcification ( no previa or sign of abruption ). ASSESSMENT:  IUP at 38 wks gest, ; c/o vag spotting with passage of mucous plug ( clinically intact membranes ); blood type-O +; reassuring  testing; grade 3 placenta; stable. PLAN:  Spoke with Dr. Javier Segura re: above findings; she would like patient to return for f/u NST this coming Saturday and then keep her f/u office appt next Tuesday ( consider induction at that time if not delivered ); home to rest, increase fluids and tylenol prn; report any further issues.   Devon Albright MD

## 2021-02-03 NOTE — FLOWSHEET NOTE
Patient is here because she passed some mucus (around 11:30) , which she stated was blood tinged. States she was examined yesterday morning by her physician. Her mom has a picture on her phone of the mucus/blood that was on the tissue when the patient wiped. Small amt. bright red, but mostly dark red blood noted. No active bleeding noted at this time.

## 2021-02-04 NOTE — FLOWSHEET NOTE
Pt given written and verbal discharge instructions. Pt scheduled for follow up NST. Understanding verbalized by pt.

## 2021-02-06 ENCOUNTER — HOSPITAL ENCOUNTER (OUTPATIENT)
Age: 21
Discharge: HOME | End: 2021-02-06
Attending: OBSTETRICS & GYNECOLOGY | Admitting: OBSTETRICS & GYNECOLOGY
Payer: COMMERCIAL

## 2021-02-06 VITALS
TEMPERATURE: 98.5 F | RESPIRATION RATE: 18 BRPM | DIASTOLIC BLOOD PRESSURE: 75 MMHG | HEART RATE: 114 BPM | SYSTOLIC BLOOD PRESSURE: 114 MMHG

## 2021-02-06 PROCEDURE — 59025 FETAL NON-STRESS TEST: CPT

## 2021-02-06 PROCEDURE — 59025 FETAL NON-STRESS TEST: CPT | Performed by: OBSTETRICS & GYNECOLOGY

## 2021-02-06 NOTE — PROGRESS NOTES
FHR Monitoring:  I have reviewed the fetal monitoring strip for Lyric Jennings during the NST:    Heart rate: baseline 130 bpm, moderate variability, 15x15 accelerations present, decelerations absent. TOCO: no contractions  Interpretation: Reactive NST    Johnathon Montana M.D.   Obstetrics and Gynecology  02/06/21, 10:46 AM

## 2021-02-09 ENCOUNTER — NURSE ONLY (OUTPATIENT)
Dept: PRIMARY CARE CLINIC | Age: 21
End: 2021-02-09

## 2021-02-09 ENCOUNTER — ROUTINE PRENATAL (OUTPATIENT)
Dept: OBGYN CLINIC | Age: 21
End: 2021-02-09

## 2021-02-09 VITALS
SYSTOLIC BLOOD PRESSURE: 120 MMHG | BODY MASS INDEX: 29.87 KG/M2 | DIASTOLIC BLOOD PRESSURE: 80 MMHG | HEART RATE: 101 BPM | WEIGHT: 174 LBS

## 2021-02-09 DIAGNOSIS — Z3A.39 39 WEEKS GESTATION OF PREGNANCY: ICD-10-CM

## 2021-02-09 DIAGNOSIS — Z01.818 PREOP EXAMINATION: ICD-10-CM

## 2021-02-09 DIAGNOSIS — Z34.83 ENCOUNTER FOR SUPERVISION OF OTHER NORMAL PREGNANCY IN THIRD TRIMESTER: Primary | ICD-10-CM

## 2021-02-09 PROCEDURE — 0502F SUBSEQUENT PRENATAL CARE: CPT | Performed by: OBSTETRICS & GYNECOLOGY

## 2021-02-09 PROCEDURE — 4004F PT TOBACCO SCREEN RCVD TLK: CPT | Performed by: OBSTETRICS & GYNECOLOGY

## 2021-02-09 PROCEDURE — G8484 FLU IMMUNIZE NO ADMIN: HCPCS | Performed by: OBSTETRICS & GYNECOLOGY

## 2021-02-09 PROCEDURE — G8419 CALC BMI OUT NRM PARAM NOF/U: HCPCS | Performed by: OBSTETRICS & GYNECOLOGY

## 2021-02-09 PROCEDURE — G8427 DOCREV CUR MEDS BY ELIG CLIN: HCPCS | Performed by: OBSTETRICS & GYNECOLOGY

## 2021-02-09 ASSESSMENT — PATIENT HEALTH QUESTIONNAIRE - PHQ9
SUM OF ALL RESPONSES TO PHQ9 QUESTIONS 1 & 2: 0
SUM OF ALL RESPONSES TO PHQ QUESTIONS 1-9: 0
SUM OF ALL RESPONSES TO PHQ QUESTIONS 1-9: 0

## 2021-02-09 NOTE — PROGRESS NOTES
Patient's last menstrual period was 05/09/2020. Please reference prenatal and OB flow chart for further information  PT here today for routine prenatal care  Pt endorses fetal movement and denies loss of fluid, contractions or vaginal bleeding  Pt without complaints  ROS:  Pt denies headache, dysuria, nausea/vomiting  PE:  /80   Pulse 101   Wt 174 lb (78.9 kg)   LMP 05/09/2020   BMI 29.87 kg/m²   Gen - Alert and oriented x 3  HEENT- NC/AT, CVS - RRR, Lungs - CTAB  Abd - FH Appropriate fetal growth  LE no edema  Reassuring fetal status at this time     Diagnosis Orders   1. Encounter for supervision of other normal pregnancy in third trimester     2. 39 weeks gestation of pregnancy         Upon completion of the visit all questions were answered and the patients follow-up and testing schedule were reviewed.

## 2021-02-10 LAB
SARS-COV-2: NOT DETECTED
SOURCE: NORMAL

## 2021-02-13 ENCOUNTER — HOSPITAL ENCOUNTER (INPATIENT)
Age: 21
LOS: 2 days | Discharge: HOME | End: 2021-02-15
Attending: OBSTETRICS & GYNECOLOGY | Admitting: OBSTETRICS & GYNECOLOGY
Payer: COMMERCIAL

## 2021-02-13 ENCOUNTER — ANESTHESIA (OUTPATIENT)
Dept: LABOR AND DELIVERY | Age: 21
End: 2021-02-13
Payer: COMMERCIAL

## 2021-02-13 ENCOUNTER — ANESTHESIA EVENT (OUTPATIENT)
Dept: LABOR AND DELIVERY | Age: 21
End: 2021-02-13
Payer: COMMERCIAL

## 2021-02-13 PROBLEM — Z78.9 ADMITTED TO LABOR AND DELIVERY: Status: ACTIVE | Noted: 2021-02-13

## 2021-02-13 LAB
ABO/RH: NORMAL
ALBUMIN SERPL-MCNC: 3.6 G/DL (ref 3.5–4.6)
ALP BLD-CCNC: 257 U/L (ref 40–130)
ALT SERPL-CCNC: 7 U/L (ref 0–33)
AMPHETAMINE SCREEN, URINE: NORMAL
ANION GAP SERPL CALCULATED.3IONS-SCNC: 14 MEQ/L (ref 9–15)
ANTIBODY SCREEN: NORMAL
AST SERPL-CCNC: 18 U/L (ref 0–35)
BACTERIA: ABNORMAL /HPF
BARBITURATE SCREEN URINE: NORMAL
BASOPHILS ABSOLUTE: 0 K/UL (ref 0–0.2)
BASOPHILS RELATIVE PERCENT: 0.3 %
BENZODIAZEPINE SCREEN, URINE: NORMAL
BILIRUB SERPL-MCNC: <0.2 MG/DL (ref 0.2–0.7)
BILIRUBIN URINE: NEGATIVE
BLOOD, URINE: ABNORMAL
BUN BLDV-MCNC: 7 MG/DL (ref 6–20)
CALCIUM SERPL-MCNC: 9.8 MG/DL (ref 8.5–9.9)
CANNABINOID SCREEN URINE: NORMAL
CHLORIDE BLD-SCNC: 103 MEQ/L (ref 95–107)
CLARITY: ABNORMAL
CO2: 19 MEQ/L (ref 20–31)
COCAINE METABOLITE SCREEN URINE: NORMAL
COLOR: YELLOW
CREAT SERPL-MCNC: 0.47 MG/DL (ref 0.5–0.9)
EOSINOPHILS ABSOLUTE: 0.1 K/UL (ref 0–0.7)
EOSINOPHILS RELATIVE PERCENT: 0.9 %
EPITHELIAL CELLS, UA: ABNORMAL /HPF (ref 0–5)
GFR AFRICAN AMERICAN: >60
GFR NON-AFRICAN AMERICAN: >60
GLOBULIN: 3 G/DL (ref 2.3–3.5)
GLUCOSE BLD-MCNC: 93 MG/DL (ref 70–99)
GLUCOSE URINE: NEGATIVE MG/DL
HCT VFR BLD CALC: 34.9 % (ref 37–47)
HEMOGLOBIN: 11.6 G/DL (ref 12–16)
HEPATITIS B SURFACE ANTIGEN INTERPRETATION: NORMAL
HYALINE CASTS: ABNORMAL /HPF (ref 0–5)
KETONES, URINE: NEGATIVE MG/DL
LEUKOCYTE ESTERASE, URINE: ABNORMAL
LYMPHOCYTES ABSOLUTE: 2.7 K/UL (ref 1–4.8)
LYMPHOCYTES RELATIVE PERCENT: 19.6 %
Lab: NORMAL
MCH RBC QN AUTO: 30.5 PG (ref 27–31.3)
MCHC RBC AUTO-ENTMCNC: 33.4 % (ref 33–37)
MCV RBC AUTO: 91.2 FL (ref 82–100)
METHADONE SCREEN, URINE: NORMAL
MONOCYTES ABSOLUTE: 1.1 K/UL (ref 0.2–0.8)
MONOCYTES RELATIVE PERCENT: 7.7 %
NEUTROPHILS ABSOLUTE: 9.9 K/UL (ref 1.4–6.5)
NEUTROPHILS RELATIVE PERCENT: 71.5 %
NITRITE, URINE: NEGATIVE
OPIATE SCREEN URINE: NORMAL
OXYCODONE URINE: NORMAL
PDW BLD-RTO: 14.4 % (ref 11.5–14.5)
PH UA: 6.5 (ref 5–9)
PHENCYCLIDINE SCREEN URINE: NORMAL
PLATELET # BLD: 292 K/UL (ref 130–400)
POTASSIUM SERPL-SCNC: 3.9 MEQ/L (ref 3.4–4.9)
PROPOXYPHENE SCREEN: NORMAL
PROTEIN UA: ABNORMAL MG/DL
RBC # BLD: 3.82 M/UL (ref 4.2–5.4)
RBC UA: ABNORMAL /HPF (ref 0–2)
RPR: NORMAL
RUBELLA ANTIBODY IGG: 45.7 IU/ML
SARS-COV-2, NAAT: NOT DETECTED
SODIUM BLD-SCNC: 136 MEQ/L (ref 135–144)
SPECIFIC GRAVITY UA: 1.01 (ref 1–1.03)
TOTAL PROTEIN: 6.6 G/DL (ref 6.3–8)
UROBILINOGEN, URINE: 0.2 E.U./DL
WBC # BLD: 13.8 K/UL (ref 4.8–10.8)
WBC UA: ABNORMAL /HPF (ref 0–5)

## 2021-02-13 PROCEDURE — 3E033VJ INTRODUCTION OF OTHER HORMONE INTO PERIPHERAL VEIN, PERCUTANEOUS APPROACH: ICD-10-PCS | Performed by: OBSTETRICS & GYNECOLOGY

## 2021-02-13 PROCEDURE — 2500000003 HC RX 250 WO HCPCS: Performed by: NURSE ANESTHETIST, CERTIFIED REGISTERED

## 2021-02-13 PROCEDURE — U0002 COVID-19 LAB TEST NON-CDC: HCPCS

## 2021-02-13 PROCEDURE — 80053 COMPREHEN METABOLIC PANEL: CPT

## 2021-02-13 PROCEDURE — 86850 RBC ANTIBODY SCREEN: CPT

## 2021-02-13 PROCEDURE — 86592 SYPHILIS TEST NON-TREP QUAL: CPT

## 2021-02-13 PROCEDURE — 87340 HEPATITIS B SURFACE AG IA: CPT

## 2021-02-13 PROCEDURE — 3E0P7VZ INTRODUCTION OF HORMONE INTO FEMALE REPRODUCTIVE, VIA NATURAL OR ARTIFICIAL OPENING: ICD-10-PCS | Performed by: OBSTETRICS & GYNECOLOGY

## 2021-02-13 PROCEDURE — 59400 OBSTETRICAL CARE: CPT | Performed by: OBSTETRICS & GYNECOLOGY

## 2021-02-13 PROCEDURE — 80307 DRUG TEST PRSMV CHEM ANLYZR: CPT

## 2021-02-13 PROCEDURE — 81001 URINALYSIS AUTO W/SCOPE: CPT

## 2021-02-13 PROCEDURE — 59025 FETAL NON-STRESS TEST: CPT | Performed by: OBSTETRICS & GYNECOLOGY

## 2021-02-13 PROCEDURE — 86900 BLOOD TYPING SEROLOGIC ABO: CPT

## 2021-02-13 PROCEDURE — 2580000003 HC RX 258: Performed by: OBSTETRICS & GYNECOLOGY

## 2021-02-13 PROCEDURE — 86901 BLOOD TYPING SEROLOGIC RH(D): CPT

## 2021-02-13 PROCEDURE — 6370000000 HC RX 637 (ALT 250 FOR IP): Performed by: OBSTETRICS & GYNECOLOGY

## 2021-02-13 PROCEDURE — 86762 RUBELLA ANTIBODY: CPT

## 2021-02-13 PROCEDURE — 36415 COLL VENOUS BLD VENIPUNCTURE: CPT

## 2021-02-13 PROCEDURE — 3700000025 EPIDURAL BLOCK: Performed by: NURSE ANESTHETIST, CERTIFIED REGISTERED

## 2021-02-13 PROCEDURE — 6360000002 HC RX W HCPCS: Performed by: NURSE ANESTHETIST, CERTIFIED REGISTERED

## 2021-02-13 PROCEDURE — 1220000000 HC SEMI PRIVATE OB R&B

## 2021-02-13 PROCEDURE — 2500000003 HC RX 250 WO HCPCS: Performed by: OBSTETRICS & GYNECOLOGY

## 2021-02-13 PROCEDURE — 85025 COMPLETE CBC W/AUTO DIFF WBC: CPT

## 2021-02-13 PROCEDURE — 6360000002 HC RX W HCPCS: Performed by: OBSTETRICS & GYNECOLOGY

## 2021-02-13 RX ORDER — IBUPROFEN 800 MG/1
800 TABLET ORAL EVERY 8 HOURS PRN
Status: DISCONTINUED | OUTPATIENT
Start: 2021-02-13 | End: 2021-02-15 | Stop reason: HOSPADM

## 2021-02-13 RX ORDER — ONDANSETRON 2 MG/ML
4 INJECTION INTRAMUSCULAR; INTRAVENOUS EVERY 6 HOURS PRN
Status: DISCONTINUED | OUTPATIENT
Start: 2021-02-13 | End: 2021-02-13

## 2021-02-13 RX ORDER — ACETAMINOPHEN 325 MG/1
650 TABLET ORAL EVERY 4 HOURS PRN
Status: DISCONTINUED | OUTPATIENT
Start: 2021-02-13 | End: 2021-02-13

## 2021-02-13 RX ORDER — NALBUPHINE HCL 10 MG/ML
5 AMPUL (ML) INJECTION
Status: COMPLETED | OUTPATIENT
Start: 2021-02-13 | End: 2021-02-13

## 2021-02-13 RX ORDER — HYDROCODONE BITARTRATE AND ACETAMINOPHEN 5; 325 MG/1; MG/1
1 TABLET ORAL EVERY 4 HOURS PRN
Status: DISCONTINUED | OUTPATIENT
Start: 2021-02-13 | End: 2021-02-15 | Stop reason: HOSPADM

## 2021-02-13 RX ORDER — DOCUSATE SODIUM 100 MG/1
100 CAPSULE, LIQUID FILLED ORAL DAILY
Status: DISCONTINUED | OUTPATIENT
Start: 2021-02-13 | End: 2021-02-15 | Stop reason: HOSPADM

## 2021-02-13 RX ORDER — NALBUPHINE HCL 10 MG/ML
5 AMPUL (ML) INJECTION
Status: DISCONTINUED | OUTPATIENT
Start: 2021-02-13 | End: 2021-02-13

## 2021-02-13 RX ORDER — ACETAMINOPHEN 325 MG/1
650 TABLET ORAL EVERY 4 HOURS PRN
Status: DISCONTINUED | OUTPATIENT
Start: 2021-02-13 | End: 2021-02-15 | Stop reason: HOSPADM

## 2021-02-13 RX ORDER — FENTANYL CITRATE 50 UG/ML
INJECTION, SOLUTION INTRAMUSCULAR; INTRAVENOUS PRN
Status: DISCONTINUED | OUTPATIENT
Start: 2021-02-13 | End: 2021-02-13 | Stop reason: SDUPTHER

## 2021-02-13 RX ORDER — DIPHENHYDRAMINE HCL 25 MG
25 TABLET ORAL EVERY 4 HOURS PRN
Status: DISCONTINUED | OUTPATIENT
Start: 2021-02-13 | End: 2021-02-13

## 2021-02-13 RX ORDER — SODIUM CHLORIDE 0.9 % (FLUSH) 0.9 %
10 SYRINGE (ML) INJECTION EVERY 12 HOURS SCHEDULED
Status: DISCONTINUED | OUTPATIENT
Start: 2021-02-13 | End: 2021-02-13

## 2021-02-13 RX ORDER — ONDANSETRON 2 MG/ML
4 INJECTION INTRAMUSCULAR; INTRAVENOUS EVERY 6 HOURS PRN
Status: DISCONTINUED | OUTPATIENT
Start: 2021-02-13 | End: 2021-02-15 | Stop reason: HOSPADM

## 2021-02-13 RX ORDER — SODIUM CHLORIDE 0.9 % (FLUSH) 0.9 %
10 SYRINGE (ML) INJECTION PRN
Status: DISCONTINUED | OUTPATIENT
Start: 2021-02-13 | End: 2021-02-13

## 2021-02-13 RX ORDER — HYDROCODONE BITARTRATE AND ACETAMINOPHEN 5; 325 MG/1; MG/1
2 TABLET ORAL EVERY 4 HOURS PRN
Status: DISCONTINUED | OUTPATIENT
Start: 2021-02-13 | End: 2021-02-15 | Stop reason: HOSPADM

## 2021-02-13 RX ORDER — NALOXONE HYDROCHLORIDE 0.4 MG/ML
0.4 INJECTION, SOLUTION INTRAMUSCULAR; INTRAVENOUS; SUBCUTANEOUS PRN
Status: DISCONTINUED | OUTPATIENT
Start: 2021-02-13 | End: 2021-02-13

## 2021-02-13 RX ORDER — MODIFIED LANOLIN
OINTMENT (GRAM) TOPICAL PRN
Status: DISCONTINUED | OUTPATIENT
Start: 2021-02-13 | End: 2021-02-15 | Stop reason: HOSPADM

## 2021-02-13 RX ORDER — BUPIVACAINE HYDROCHLORIDE 2.5 MG/ML
INJECTION, SOLUTION EPIDURAL; INFILTRATION; INTRACAUDAL PRN
Status: DISCONTINUED | OUTPATIENT
Start: 2021-02-13 | End: 2021-02-13 | Stop reason: SDUPTHER

## 2021-02-13 RX ORDER — DOCUSATE SODIUM 100 MG/1
100 CAPSULE, LIQUID FILLED ORAL 2 TIMES DAILY PRN
Status: DISCONTINUED | OUTPATIENT
Start: 2021-02-13 | End: 2021-02-13

## 2021-02-13 RX ORDER — SODIUM CHLORIDE, SODIUM LACTATE, POTASSIUM CHLORIDE, CALCIUM CHLORIDE 600; 310; 30; 20 MG/100ML; MG/100ML; MG/100ML; MG/100ML
INJECTION, SOLUTION INTRAVENOUS CONTINUOUS
Status: DISCONTINUED | OUTPATIENT
Start: 2021-02-13 | End: 2021-02-13

## 2021-02-13 RX ORDER — SODIUM CHLORIDE 0.9 % (FLUSH) 0.9 %
10 SYRINGE (ML) INJECTION EVERY 12 HOURS SCHEDULED
Status: DISCONTINUED | OUTPATIENT
Start: 2021-02-13 | End: 2021-02-15 | Stop reason: HOSPADM

## 2021-02-13 RX ORDER — SODIUM CHLORIDE 0.9 % (FLUSH) 0.9 %
10 SYRINGE (ML) INJECTION PRN
Status: DISCONTINUED | OUTPATIENT
Start: 2021-02-13 | End: 2021-02-15 | Stop reason: HOSPADM

## 2021-02-13 RX ADMIN — IBUPROFEN 800 MG: 800 TABLET ORAL at 22:37

## 2021-02-13 RX ADMIN — Medication 1 MILLI-UNITS/MIN: at 17:30

## 2021-02-13 RX ADMIN — Medication 12 ML/HR: at 12:36

## 2021-02-13 RX ADMIN — SODIUM CHLORIDE, POTASSIUM CHLORIDE, SODIUM LACTATE AND CALCIUM CHLORIDE: 600; 310; 30; 20 INJECTION, SOLUTION INTRAVENOUS at 14:24

## 2021-02-13 RX ADMIN — FENTANYL CITRATE 100 MCG: 50 INJECTION, SOLUTION INTRAMUSCULAR; INTRAVENOUS at 13:30

## 2021-02-13 RX ADMIN — SODIUM CHLORIDE, POTASSIUM CHLORIDE, SODIUM LACTATE AND CALCIUM CHLORIDE: 600; 310; 30; 20 INJECTION, SOLUTION INTRAVENOUS at 10:47

## 2021-02-13 RX ADMIN — SODIUM CHLORIDE, POTASSIUM CHLORIDE, SODIUM LACTATE AND CALCIUM CHLORIDE: 600; 310; 30; 20 INJECTION, SOLUTION INTRAVENOUS at 11:51

## 2021-02-13 RX ADMIN — NALBUPHINE HYDROCHLORIDE 5 MG: 10 INJECTION, SOLUTION INTRAMUSCULAR; INTRAVENOUS; SUBCUTANEOUS at 10:38

## 2021-02-13 RX ADMIN — ONDANSETRON 4 MG: 2 INJECTION INTRAMUSCULAR; INTRAVENOUS at 16:36

## 2021-02-13 RX ADMIN — SODIUM CHLORIDE, POTASSIUM CHLORIDE, SODIUM LACTATE AND CALCIUM CHLORIDE: 600; 310; 30; 20 INJECTION, SOLUTION INTRAVENOUS at 16:18

## 2021-02-13 RX ADMIN — Medication 2 ML: at 12:34

## 2021-02-13 RX ADMIN — BUPIVACAINE HYDROCHLORIDE 2 ML: 2.5 INJECTION, SOLUTION EPIDURAL; INFILTRATION; INTRACAUDAL at 13:30

## 2021-02-13 RX ADMIN — Medication 25 MCG: at 08:15

## 2021-02-13 RX ADMIN — Medication 2 ML: at 12:32

## 2021-02-13 NOTE — OP NOTE
PROCEDURE NOTE: VAGINAL DELIVERY  23 y.o. Katelyn Glen at 40w0d GA who presented to [facility] triage for induction of labor due to PROM. Pt underwent induction of labor with cytotec followed by pitocin. Pt with epidural for pain management. Pt then with rectal pressure and the desire to push. PT was instructed on pushing efforts and the infant's head was delivered atraumatically followed quickly by the rest of the body. The infant with spontaneous cry was then laid on the mother's abdomen and the cord remained intact for 1 minute for delayed cord clamping. The cord was then clamped and cut and the infant was placed for skin to skin care. The cord blood was then drawn for labs and the placenta delivered spontaneously. Three vessel cord noted. The vaginal and cervix were inspected and no lacerations were noted. The infant, a viable male infant was born at 36 with Apgars 8 and 9 and wt pending  Mother was noted to have excellent uterine tone. Sponge and instrument counts were correct x 2 and mother and baby were recovered in room without difficulty.      EBL: [id number] [facility],

## 2021-02-13 NOTE — FLOWSHEET NOTE
Anesthesia notified patient remains uncomfortable on right side. Patient positioned to right . Anesthesia recommends giving patient bolus. Bolus given. Patient remains on right side.

## 2021-02-13 NOTE — FLOWSHEET NOTE
Dr Campbell Patel updated on vag exam, progression of labor, had to redo epidural,, contx pattern, only one cytotec given, forebag felt.  States to let patient rest and have house officer arom forebag around 1700

## 2021-02-13 NOTE — ANESTHESIA PRE PROCEDURE
Department of Anesthesiology  Preprocedure Note       Name:  Wesly Kim   Age:  23 y.o.  :  2000                                          MRN:  [id number]         Date:  2021      Surgeon: * No surgeons listed *    Procedure: * No procedures listed *    Medications prior to admission:   Prior to Admission medications    Medication Sig Start Date End Date Taking?  Authorizing Provider   valACYclovir (VALTREX) 500 MG tablet Take 1 tablet by mouth daily 10/27/20   [facility], DO   Fsihqo-JbRib-JeAiy-Meth-FA-DHA (PRENATE MINI) 18-0.6-0.4-350 MG CAPS Take 1 tablet by mouth daily 10/20/20   [facility], DO       Current medications:    Current Facility-Administered Medications   Medication Dose Route Frequency Provider Last Rate Last Admin    miSOPROStol (CYTOTEC) pre-split tablet TABS 25 mcg  25 mcg Oral Q4H [facility], DO        oxytocin (PITOCIN) 30 units in 500 mL infusion  1 jude-units/min Intravenous Continuous Yulisa Chun, DO        lactated ringers infusion   Intravenous Continuous Yulisa Chun, DO        sodium chloride flush 0.9 % injection 10 mL  10 mL Intravenous 2 times per day [facility], DO        sodium chloride flush 0.9 % injection 10 mL  10 mL Intravenous PRN [facility], DO        nalbuphine (NUBAIN) injection 5 mg  5 mg Intramuscular Once PRN [facility], DO        And    nalbuphine (NUBAIN) injection 5 mg  5 mg Intravenous Once PRN Nikolai Falconaire, DO        ondansetron (ZOFRAN) injection 4 mg  4 mg Intravenous Q6H PRN [facility], DO        diphenhydrAMINE (BENADRYL) tablet 25 mg  25 mg Oral Q4H PRN Nikolai Falconaire, DO        acetaminophen (TYLENOL) tablet 650 mg  650 mg Oral Q4H PRN Rajita Adiel, DO        benzocaine-menthol (DERMOPLAST) 20-0.5 % spray   Topical PRN [facility], DO        docusate sodium (COLACE) capsule 100 mg  100 mg Oral BID PRN [place]land Falconaire, DO  oxytocin (PITOCIN) 10 unit bolus from the bag  10 Units Intravenous PRN Neil Sprinkles, DO        And    oxytocin (PITOCIN) 30 units in 500 mL infusion  87.3 jude-units/min Intravenous PRN Neil Sprinkles, DO           Allergies: Allergies   Allergen Reactions    Pcn [Penicillins] Hives       Problem List:    Patient Active Problem List   Diagnosis Code    Candidal vaginitis B37.3    Bacterial vaginitis N76.0, B96.89    Intact amniotic membranes during pregnancy in third trimester Z34.93    34 weeks gestation of pregnancy Z3A.34    Vaginal spotting N93.9    Blood type O+ Z67.40    38 weeks gestation of pregnancy Z3A.38       Past Medical History:        Diagnosis Date    Herpes simplex virus (HSV) infection        Past Surgical History:  History reviewed. No pertinent surgical history. Social History:    Social History     Tobacco Use    Smoking status: Current Every Day Smoker     Packs/day: 0.25     Years: 1.00     Pack years: 0.25     Types: Cigarettes    Smokeless tobacco: Never Used    Tobacco comment: every couple days I may have a cig   Substance Use Topics    Alcohol use: Not Currently     Alcohol/week: 0.0 standard drinks     Comment: soacially before pregancy                                Ready to quit: Not Answered  Counseling given: Not Answered  Comment: every couple days I may have a cig      Vital Signs (Current): There were no vitals filed for this visit.                                            BP Readings from Last 3 Encounters:   02/09/21 120/80   02/06/21 114/75   02/03/21 116/72       NPO Status:                                                                                 BMI:   Wt Readings from Last 3 Encounters:   02/09/21 174 lb (78.9 kg)   02/03/21 170 lb (77.1 kg)   02/02/21 170 lb (77.1 kg)     There is no height or weight on file to calculate BMI.    CBC:   Lab Results   Component Value Date    WBC 10.9 01/07/2021    RBC 3.40 01/07/2021 HGB 10.8 01/07/2021    HCT 32.2 01/07/2021    MCV 94.7 01/07/2021    RDW 13.3 01/07/2021     01/07/2021       CMP:   Lab Results   Component Value Date     01/07/2021    K 3.9 01/07/2021     01/07/2021    CO2 21 01/07/2021    BUN 6 01/07/2021    CREATININE 0.41 01/07/2021    GFRAA >60.0 01/07/2021    LABGLOM >60.0 01/07/2021    GLUCOSE 86 01/07/2021    GLUCOSE 103 12/30/2019    PROT 6.1 01/07/2021    CALCIUM 9.1 01/07/2021    BILITOT <0.2 01/07/2021    ALKPHOS 169 01/07/2021    AST 12 01/07/2021    ALT 7 01/07/2021       POC Tests: No results for input(s): POCGLU, POCNA, POCK, POCCL, POCBUN, POCHEMO, POCHCT in the last 72 hours. Coags: No results found for: PROTIME, INR, APTT    HCG (If Applicable):   Lab Results   Component Value Date    PREGTESTUR negative 08/01/2017        ABGs: No results found for: PHART, PO2ART, OBV9JQH, FTI2LBH, BEART, K8EMXKAU     Type & Screen (If Applicable):  No results found for: LABABO, LABRH    Drug/Infectious Status (If Applicable):  No results found for: HIV, HEPCAB    COVID-19 Screening (If Applicable):   Lab Results   Component Value Date    COVID19 Not Detected 02/09/2021         Anesthesia Evaluation  Patient summary reviewed and Nursing notes reviewed  Airway: Mallampati: II  TM distance: >3 FB   Neck ROM: full  Mouth opening: > = 3 FB Dental: normal exam         Pulmonary:                              Cardiovascular:                      Neuro/Psych:               GI/Hepatic/Renal:             Endo/Other:                     Abdominal:           Vascular:                                        Anesthesia Plan      epidural     ASA 2             Anesthetic plan and risks discussed with patient. Use of blood products discussed with patient whom consented to blood products.                    GENNARO Dumont - VICTORIANO   2/13/2021

## 2021-02-13 NOTE — H&P
Department of Obstetrics and Gynecology  Attending Obstetrics History and Physical        CHIEF COMPLAINT:  abdominal pain, contractions, Active Labor    HISTORY OF PRESENT ILLNESS:      The patient is is  presents for rupture of membranes however no labor. And cytotec was begun for induction of labor. No bleeding clear fluid. Past Medical History:        Diagnosis Date    Herpes simplex virus (HSV) infection      Past Surgical History:    History reviewed. No pertinent surgical history. Social History:    TOBACCO:   reports that she has been smoking cigarettes. She has a 0.25 pack-year smoking history. She has never used smokeless tobacco.  ETOH:   reports previous alcohol use. DRUGS:   reports no history of drug use. SEXUAL HISTORY:    DOMESTIC VIOLENCE:  no  ACTIVITIES OF DAILY LIVING:    INSTRUMENTAL ACTIVITIES OF DAILY LIVING:  Patient is able to perform all instrumental activities of daily living. MARITAL STATUS:    OCCUPATION:    LEVEL OF EDUCATION:    Patient currently lives with family   Environmental/chemical exposure:  None  Travel History:  None   Pets:  None    Family History:       Problem Relation Age of Onset    Cancer Sister         leukemia    Heart Disease Maternal Uncle      Medications Prior to Admission:  Medications Prior to Admission: valACYclovir (VALTREX) 500 MG tablet, Take 1 tablet by mouth daily  Rgcgxr-LzCvg-OlUka-Meth-FA-DHA (PRENATE MINI) 18-0.6-0.4-350 MG CAPS, Take 1 tablet by mouth daily  Allergies:  Pcn [penicillins]    REVIEW OF SYSTEMS:    Patient has no history of depression.   Patient has no symptoms of depression  CONSTITUTIONAL:  negative for  fevers, chills and sweats  RESPIRATORY:  negative for  dry cough and dyspnea  CARDIOVASCULAR:  negative for  chest pain, dyspnea, palpitations  GASTROINTESTINAL:  negative for nausea, vomiting, diarrhea and constipation  GENITOURINARY:  negative for frequency and dysuria  NEUROLOGICAL:  negative for headaches, dizziness and seizures  BEHAVIOR/PSYCH:  negative for depressed mood and elated mood    PHYSICAL EXAM:    General appearance:  awake, alert, cooperative, no apparent distress, and appears stated age  Neurologic:  Awake, alert, oriented to name, place and time. Cranial nerves II-XII are grossly intact. Motor is 5 out of 5 bilaterally. Cerebellar finger to nose, heel to shin intact. Sensory is intact.   Babinski down going, Romberg negative, and gait is normal.  Lungs:  No increased work of breathing, good air exchange, clear to auscultation bilaterally, no crackles or wheezing  Heart:  Normal apical impulse, regular rate and rhythm, normal S1 and S2, no S3 or S4, and no murmur noted  Abdomen:  No scars, normal bowel sounds, soft, non-distended, non-tender, no masses palpated, no hepatosplenomegally  Fetal heart rate:  Baseline Heart Rate 145, accelerations:  present  decelerations:  variable  Pelvis:  External Genitalia: General appearance; normal, Hair distribution; normal, Lesions absent  Cervix:    DILATION:  Complete  EFFACEMENT:   100%  STATION:  +1 cm  CONSISTENCY:  soft  POSITION:  posterior      Contraction frequency:  3 minutes  Membranes:  Ruptured clear fluid        General Labs:   Admission on 02/13/2021   Component Date Value Ref Range Status    ABO/Rh 02/13/2021 O POS   Final    Antibody Screen 02/13/2021 NEG   Final    WBC 02/13/2021 13.8* 4.8 - 10.8 K/uL Final    RBC 02/13/2021 3.82* 4.20 - 5.40 M/uL Final    Hemoglobin 02/13/2021 11.6* 12.0 - 16.0 g/dL Final    Hematocrit 02/13/2021 34.9* 37.0 - 47.0 % Final    MCV 02/13/2021 91.2  82.0 - 100.0 fL Final    MCH 02/13/2021 30.5  27.0 - 31.3 pg Final    MCHC 02/13/2021 33.4  33.0 - 37.0 % Final    RDW 02/13/2021 14.4  11.5 - 14.5 % Final    Platelets 64/01/3911 292  130 - 400 K/uL Final    Neutrophils % 02/13/2021 71.5  % Final    Lymphocytes % 02/13/2021 19.6  % Final    Monocytes % 02/13/2021 7.7  % Final    Eosinophils % 02/13/2021 0.9  % Final    Basophils % 02/13/2021 0.3  % Final    Neutrophils Absolute 02/13/2021 9.9* 1.4 - 6.5 K/uL Final    Lymphocytes Absolute 02/13/2021 2.7  1.0 - 4.8 K/uL Final    Monocytes Absolute 02/13/2021 1.1* 0.2 - 0.8 K/uL Final    Eosinophils Absolute 02/13/2021 0.1  0.0 - 0.7 K/uL Final    Basophils Absolute 02/13/2021 0.0  0.0 - 0.2 K/uL Final    RPR 02/13/2021 Non-reactive  Non-reactive Final    Rubella Antibody IgG 02/13/2021 45.7  IU/mL Final    Comment: Patient's result indicates immunity. Default Normal Ranges    >=10 Presumed Immune  <10  Presumed Not immune      Sodium 02/13/2021 136  135 - 144 mEq/L Final    Potassium 02/13/2021 3.9  3.4 - 4.9 mEq/L Final    Chloride 02/13/2021 103  95 - 107 mEq/L Final    CO2 02/13/2021 19* 20 - 31 mEq/L Final    Anion Gap 02/13/2021 14  9 - 15 mEq/L Final    Glucose 02/13/2021 93  70 - 99 mg/dL Final    BUN 02/13/2021 7  6 - 20 mg/dL Final    CREATININE 02/13/2021 0.47* 0.50 - 0.90 mg/dL Final    GFR Non- 02/13/2021 >60.0  >60 Final    Comment: >60 mL/min/1.73m2 EGFR, calc. for ages 25 and older using the  MDRD formula (not corrected for weight), is valid for stable  renal function.  GFR  02/13/2021 >60.0  >60 Final    Comment: >60 mL/min/1.73m2 EGFR, calc. for ages 25 and older using the  MDRD formula (not corrected for weight), is valid for stable  renal function.       Calcium 02/13/2021 9.8  8.5 - 9.9 mg/dL Final    Total Protein 02/13/2021 6.6  6.3 - 8.0 g/dL Final    Albumin 02/13/2021 3.6  3.5 - 4.6 g/dL Final    Total Bilirubin 02/13/2021 <0.2  0.2 - 0.7 mg/dL Final    Alkaline Phosphatase 02/13/2021 257* 40 - 130 U/L Final    ALT 02/13/2021 7  0 - 33 U/L Final    AST 02/13/2021 18  0 - 35 U/L Final    Globulin 02/13/2021 3.0  2.3 - 3.5 g/dL Final    SARS-CoV-2, NAAT 02/13/2021 Not Detected  Not Detected Final    Comment: Rapid NAAT:   Negative results should be treated as presumptive and,  if inconsistent with clinical signs and symptoms or necessary for  patient management, should be tested with an alternative molecular  assay. Negative results do not preclude SARS-CoV-2 infection and  should not be used as the sole basis for patient management decisions. This test has been authorized by the FDA under an Emergency Use  Authorization (EUA) for use by authorized laboratories. Fact sheet for Healthcare Providers:  Coy.es  Fact sheet for Patients: BuildHer.es    METHODOLOGY: Isothermal Nucleic Acid Amplification      Hep B S Ag Interp 02/13/2021 Non-reactive   Final    Amphetamine Screen, Urine 02/13/2021 Neg  Negative <1000 ng/mL Final    Barbiturate Screen, Ur 02/13/2021 Neg  Negative < 200 ng/mL Final    Benzodiazepine Screen, Urine 02/13/2021 Neg  Negative < 200 ng/mL Final    Cannabinoid Scrn, Ur 02/13/2021 Neg  Negative < 50 ng/mL Final    Cocaine Metabolite Screen, Urine 02/13/2021 Neg  Negative < 300 ng/mL Final    Opiate Scrn, Ur 02/13/2021 Neg  Negative < 300 ng/mL Final    PCP Screen, Urine 02/13/2021 Neg  Negative < 25 ng/mL Final    Methadone Screen, Urine 02/13/2021 Neg  Negative <300 ng/mL Final    Propoxyphene Scrn, Ur 02/13/2021 Neg  Negative <300 ng/mL Final    Oxycodone Urine 02/13/2021 Neg  Negative <100 ng/mL Final    Drug Screen Comment: 02/13/2021 see below   Final    Comment: This method is a screening test to detect only these drug  classes as part of a medical workup. Confirmatory testing  by another method should be ordered if clinically indicated.       Color, UA 02/13/2021 Yellow  Straw/Yellow Final    Clarity, UA 02/13/2021 CLOUDY* Clear Final    Glucose, Ur 02/13/2021 Negative  Negative mg/dL Final    Bilirubin Urine 02/13/2021 Negative  Negative Final    Ketones, Urine 02/13/2021 Negative  Negative mg/dL Final    Specific Gravity, UA 02/13/2021 1.010  1.005 - 1.030 Final    Blood, Urine 2021 MODERATE* Negative Final    pH, UA 2021 6.5  5.0 - 9.0 Final    Protein, UA 2021 TRACE* Negative mg/dL Final    Urobilinogen, Urine 2021 0.2  <2.0 E.U./dL Final    Nitrite, Urine 2021 Negative  Negative Final    Leukocyte Esterase, Urine 2021 TRACE* Negative Final    RBC, UA 2021 10-20* 0 - 2 /HPF Final    Bacteria, UA 2021 FEW* Negative /HPF Final    Hyaline Casts, UA 2021 1-3  0 - 5 /HPF Final    WBC, UA 2021 10-20* 0 - 5 /HPF Final    Epithelial Cells, UA 2021 6-10  0 - 5 /HPF Final       ASSESSMENT AND PLAN:    The patient is a 23 y.o.  @ 40w0d.     Post dates induction due to PROM  S/p epidural  NSt reactive overall reassuring

## 2021-02-13 NOTE — FLOWSHEET NOTE
Take aleve 2 twice daily with food, use heating pad 15 min 4 times daily, if pain not resolving in 1-2 days follow-up for further testing.  Follow up with your PCP in 3 - 4 days if worsening or not improving:    Sadie Plascencia, DO  Patient Education     Pelvic Pain, Uncertain Cause    Pelvic pain is pain felt in the lowest part of the belly (abdomen) and between the hipbones. The pain may occur suddenly and recently (acute). Or the pain may last for 6 months or longer (chronic).  There are many possible causes of pelvic pain. The pain may be due to a problem in the female reproductive system. Or, it may be due to a problem in the digestive, urinary, or musculoskeletal systems.  Based on your visit today, the exact cause of your pelvic pain is not certain. Your condition does not appear to be serious at this time. But it is important for you to keep watching for any new symptoms or worsening of your condition.  General care  Your healthcare provider may advise a number of ways to help manage your pain. These can include:  · Taking over-the-counter pain medicine. Stronger pain medicine may also be prescribed, if needed.  · Applying heat to the pelvic area. Use a heating pad or a hot pack. Taking a hot bath may also help.  · Getting plenty of rest.  · Making certain lifestyle changes. These can include practicing good posture and getting regular exercise. Studies have shown that these changes help reduce pelvic pain in some women.  · Seeing a physical therapist or pain specialist. These healthcare providers can discuss other ways to manage pain with you.  Follow-up care  Follow up with your healthcare provider, or as advised.   When to seek medical advice  Call your healthcare provider right away if any of the following occur:  · Fever of 100.4°F or higher, or as directed by your healthcare provider  · Pain worsens or you have sudden, severe pain or new pain  · Nausea, vomiting, sweating, or  Time out restlessness  · Dizziness or fainting  · Unusual vaginal discharge  · Abnormal vaginal bleeding (especially bleeding after menopause)  Date Last Reviewed: 10/1/2017  © 7599-6531 [employer]. [address] [place] [address], [place] [address]. All rights reserved. This information is not intended as a substitute for professional medical care. Always follow your healthcare professional's instructions.           Thank you for choosing [facility] for your healthcare needs.    We strive to provide you with excellent service and hope that we have exceeded your expectations.     If you receive a survey in the mail, we hope that you will complete it and agree that we have provided \"Very Good\" care today.  If you would like to speak to us about your visit, please contact our center at:    [facility]  [phone]    [facility]  [phone]    [facility]  [phone]

## 2021-02-13 NOTE — ANESTHESIA PROCEDURE NOTES
Epidural Block    Patient location during procedure: OB  Start time: 2/13/2021 12:18 PM  End time: 2/13/2021 12:30 PM  Reason for block: labor epidural  Staffing  Performed: resident/CRNA   Resident/CRNA: GENNARO Strong CRNA  Preanesthetic Checklist  Completed: patient identified, IV checked, risks and benefits discussed, monitors and equipment checked, pre-op evaluation, timeout performed, anesthesia consent given, oxygen available and patient being monitored  Epidural  Patient position: sitting  Prep: ChloraPrep  Patient monitoring: frequent blood pressure checks and continuous pulse ox  Approach: midline  Location: lumbar (1-5)  Injection technique: PEDRO saline  Provider prep: mask and sterile gown  Needle  Needle type: Tuohy   Needle gauge: 17 G  Needle length: 3.5 in  Needle insertion depth: 6 cm  Catheter type: end hole  Catheter size: 19 G  Catheter at skin depth: 12 cm  Test dose: negative (@1226, 3 ml 1.5% lido wirh 1:200k epi)  Assessment  Sensory level: T6  Hemodynamics: stable  Attempts: 1  Additional Notes  Negative heme and CSF to catheter aspiration.  Negative parasthesia

## 2021-02-13 NOTE — FLOWSHEET NOTE
Medicated with 5mg nubain slow iv. Patient states her discomfort is increasing. Anesthesia, Grazyna Man, notified patient may be requesting epidural shortly.

## 2021-02-13 NOTE — FLOWSHEET NOTE
Dr Hurt Needs inquires of patient. Informed of initial vag exam, patient had her cytotec, fhr reactive, contraction pattern. No further orders.

## 2021-02-13 NOTE — FLOWSHEET NOTE
Remains uncomfortable with contractions.  Anesthesia notified that patient opts to have epidural redone

## 2021-02-13 NOTE — FLOWSHEET NOTE
Dr Rachele De La Cruz notified patient having moderate variables with contractions. Patient is repositioned left, right. Now 8cm.  States she is coming in

## 2021-02-14 LAB
HCT VFR BLD CALC: 27 % (ref 37–47)
HEMOGLOBIN: 8.8 G/DL (ref 12–16)
MCH RBC QN AUTO: 30.1 PG (ref 27–31.3)
MCHC RBC AUTO-ENTMCNC: 32.6 % (ref 33–37)
MCV RBC AUTO: 92.4 FL (ref 82–100)
PDW BLD-RTO: 14.6 % (ref 11.5–14.5)
PLATELET # BLD: 207 K/UL (ref 130–400)
RBC # BLD: 2.92 M/UL (ref 4.2–5.4)
WBC # BLD: 12.9 K/UL (ref 4.8–10.8)

## 2021-02-14 PROCEDURE — 36415 COLL VENOUS BLD VENIPUNCTURE: CPT

## 2021-02-14 PROCEDURE — 6370000000 HC RX 637 (ALT 250 FOR IP): Performed by: OBSTETRICS & GYNECOLOGY

## 2021-02-14 PROCEDURE — 85027 COMPLETE CBC AUTOMATED: CPT

## 2021-02-14 PROCEDURE — 1220000000 HC SEMI PRIVATE OB R&B

## 2021-02-14 PROCEDURE — 99024 POSTOP FOLLOW-UP VISIT: CPT | Performed by: OBSTETRICS & GYNECOLOGY

## 2021-02-14 RX ORDER — FERROUS SULFATE 325(65) MG
325 TABLET ORAL 2 TIMES DAILY WITH MEALS
Status: DISCONTINUED | OUTPATIENT
Start: 2021-02-14 | End: 2021-02-15 | Stop reason: HOSPADM

## 2021-02-14 RX ADMIN — ACETAMINOPHEN 650 MG: 325 TABLET ORAL at 11:14

## 2021-02-14 RX ADMIN — FERROUS SULFATE TAB 325 MG (65 MG ELEMENTAL FE) 325 MG: 325 (65 FE) TAB at 09:23

## 2021-02-14 RX ADMIN — FERROUS SULFATE TAB 325 MG (65 MG ELEMENTAL FE) 325 MG: 325 (65 FE) TAB at 16:50

## 2021-02-14 RX ADMIN — DOCUSATE SODIUM 100 MG: 100 CAPSULE, LIQUID FILLED ORAL at 09:23

## 2021-02-14 RX ADMIN — IBUPROFEN 800 MG: 800 TABLET ORAL at 06:33

## 2021-02-14 RX ADMIN — IBUPROFEN 800 MG: 800 TABLET ORAL at 20:56

## 2021-02-14 ASSESSMENT — PAIN SCALES - GENERAL: PAINLEVEL_OUTOF10: 4

## 2021-02-14 NOTE — PLAN OF CARE
Problem: Discharge Planning:  Goal: Discharged to appropriate level of care  Description: Discharged to appropriate level of care  Outcome: Ongoing     Problem: Constipation:  Goal: Bowel elimination is within specified parameters  Description: Bowel elimination is within specified parameters  Outcome: Ongoing     Problem: Mood - Altered:  Goal: Mood stable  Description: Mood stable  Outcome: Ongoing     Problem: Pain - Acute:  Goal: Pain level will decrease  Description: Pain level will decrease  Outcome: Ongoing

## 2021-02-14 NOTE — PROGRESS NOTES
Postpartum Day 1 : Spontaneous vaginal delivery       The patient feels ok . The patient denies emotional concerns. Pain is  controlled with current medications. The baby is well. Urinary output is adequate. Objective:       Vitals:    02/14/21 0423   BP: 114/62   Pulse: 90   Resp: 18   Temp: 97.9 °F (36.6 °C)   SpO2:        General:    alert, appears stated age and cooperative   Bowel Sounds:  active   Lochia:  appropriate   Uterine Fundus:   firm   Incision:  CDI   DVT Evaluation:  No evidence of DVT seen on physical exam.        Value Ref Range & Units Status Collected Lab   WBC 12. 9High   4.8 - 10.8 K/uL Final 02/14/2021  4:55 AM  - [facility]   RBC 2. 92Low   4.20 - 5.40 M/uL Final 02/14/2021  4:55 AM  - [facility]   Hemoglobin 8.8Low   12.0 - 16.0 g/dL Final 02/14/2021  4:55 AM  - [facility]   Hematocrit 27. 0Low   37.0 - 47.0 % Final 02/14/2021  4:55 AM  - [facility]   MCV 92.4  82.0 - 100.0 fL Final 02/14/2021  4:55 AM  - [facility]   MCH 30.1  27.0 - 31.3 pg Final 02/14/2021  4:55 AM  - [facility]   MCHC 32.6Low   33.0 - 37.0 % Final 02/14/2021  4:55 AM [address] [facility]   RDW 14.6High   11.5 - 14.5 % Final 02/14/2021  4:55 AM  - [facility]   Platelets 856  988 - 400 K/uL Final 02/14/2021  4:55 AM [address] [facility]   Testing Performed By        Assessment:     Status Postpartum Day 1 : Spontaneous vaginal delivery  Doing well       Plan:     Continue current care.   Ambulate   Anemia : not symptomatic: will start oral iron   Bishop Cross MD

## 2021-02-14 NOTE — PLAN OF CARE
Problem: Discharge Planning:  Goal: Discharged to appropriate level of care  Description: Discharged to appropriate level of care  Outcome: Ongoing     Problem: Constipation:  Goal: Bowel elimination is within specified parameters  Description: Bowel elimination is within specified parameters  Outcome: Ongoing     Problem: Mood - Altered:  Goal: Mood stable  Description: Mood stable  Outcome: Ongoing     Problem: Pain - Acute:  Goal: Pain level will decrease  Description: Pain level will decrease  Outcome: Met This Shift     Problem: Pain:  Description: Pain management should include both nonpharmacologic and pharmacologic interventions.   Goal: Pain level will decrease  Description: Pain level will decrease  Outcome: Met This Shift  Goal: Control of acute pain  Description: Control of acute pain  Outcome: Ongoing  Goal: Control of chronic pain  Description: Control of chronic pain  Outcome: Ongoing

## 2021-02-14 NOTE — PLAN OF CARE
Problem: Anxiety:  Goal: Level of anxiety will decrease  Description: Level of anxiety will decrease  2/13/2021 2150 by Giulia Hairston RN  Outcome: Met This Shift  2/13/2021 0755 by James Morton RN  Outcome: Ongoing     Problem: Breathing Pattern - Ineffective:  Goal: Able to breathe comfortably  Description: Able to breathe comfortably  2/13/2021 2150 by Giulia Hairston RN  Outcome: Met This Shift  2/13/2021 0755 by James Morton RN  Outcome: Ongoing     Problem: Fluid Volume - Imbalance:  Goal: Absence of imbalanced fluid volume signs and symptoms  Description: Absence of imbalanced fluid volume signs and symptoms  2/13/2021 2150 by Giulia Hairston RN  Outcome: Met This Shift  2/13/2021 0755 by James Morton RN  Outcome: Ongoing  Goal: Absence of intrapartum hemorrhage signs and symptoms  Description: Absence of intrapartum hemorrhage signs and symptoms  2/13/2021 2150 by Giulia Hairston RN  Outcome: Met This Shift  2/13/2021 0755 by James Morton RN  Outcome: Ongoing     Problem: Infection - Intrapartum Infection:  Goal: Will show no infection signs and symptoms  Description: Will show no infection signs and symptoms  2/13/2021 2150 by Giulia Hairston RN  Outcome: Met This Shift  2/13/2021 0755 by James Morton RN  Outcome: Ongoing

## 2021-02-14 NOTE — ANESTHESIA POSTPROCEDURE EVALUATION
Department of Anesthesiology  Postprocedure Note    Patient: Amber Deras  MRN: [id number]  YOB: 2000  Date of evaluation: 2/13/2021  Time:  8:49 PM     Procedure Summary     Date: 02/13/21 Room / Location:     Anesthesia Start: 1210 Anesthesia Stop: 8824    Procedure: Labor Analgesia Diagnosis:     Scheduled Providers:  Responsible Provider: GENNARO Dumont CRNA    Anesthesia Type: epidural ASA Status: 2          Anesthesia Type: epidural    Abel Phase I: Abel Score: 9    Abel Phase II:      Last vitals: Reviewed and per EMR flowsheets.        Anesthesia Post Evaluation    Patient location during evaluation: bedside  Patient participation: complete - patient participated  Level of consciousness: awake and alert  Pain score: 0  Airway patency: patent  Nausea & Vomiting: no nausea and no vomiting  Complications: no  Cardiovascular status: hemodynamically stable  Respiratory status: acceptable, spontaneous ventilation and nonlabored ventilation  Hydration status: euvolemic

## 2021-02-15 VITALS
WEIGHT: 174.3 LBS | BODY MASS INDEX: 29.76 KG/M2 | RESPIRATION RATE: 16 BRPM | HEART RATE: 87 BPM | SYSTOLIC BLOOD PRESSURE: 128 MMHG | HEIGHT: 64 IN | TEMPERATURE: 98.2 F | DIASTOLIC BLOOD PRESSURE: 83 MMHG | OXYGEN SATURATION: 99 %

## 2021-02-15 PROCEDURE — 99024 POSTOP FOLLOW-UP VISIT: CPT | Performed by: OBSTETRICS & GYNECOLOGY

## 2021-02-15 PROCEDURE — 6370000000 HC RX 637 (ALT 250 FOR IP): Performed by: OBSTETRICS & GYNECOLOGY

## 2021-02-15 PROCEDURE — 7200000001 HC VAGINAL DELIVERY

## 2021-02-15 RX ORDER — IBUPROFEN 800 MG/1
800 TABLET ORAL EVERY 8 HOURS PRN
Qty: 90 TABLET | Refills: 1 | Status: SHIPPED | OUTPATIENT
Start: 2021-02-15 | End: 2021-10-29

## 2021-02-15 RX ADMIN — DOCUSATE SODIUM 100 MG: 100 CAPSULE, LIQUID FILLED ORAL at 08:42

## 2021-02-15 RX ADMIN — IBUPROFEN 800 MG: 800 TABLET ORAL at 12:57

## 2021-02-15 RX ADMIN — IBUPROFEN 800 MG: 800 TABLET ORAL at 05:55

## 2021-02-15 RX ADMIN — FERROUS SULFATE TAB 325 MG (65 MG ELEMENTAL FE) 325 MG: 325 (65 FE) TAB at 08:42

## 2021-02-15 ASSESSMENT — PAIN SCALES - GENERAL
PAINLEVEL_OUTOF10: 5
PAINLEVEL_OUTOF10: 2

## 2021-02-15 NOTE — DISCHARGE SUMMARY
PROGRESS NOTE POSTPARTUM DAY 2   DISCHARGE SUMMARY    Pt doing well. Pt is bottle feeding without issue. Pt eager for discharge. Patient is s/p post dates induction and PROM presentation. S/p  no laceration mom and baby are well    /63   Pulse 89   Temp 97.3 °F (36.3 °C) (Oral)   Resp 16   Ht 5' 4\" (1.626 m)   Wt 174 lb 4.8 oz (79.1 kg)   LMP 2020   SpO2 99%   Breastfeeding Unknown   BMI 29.92 kg/m²   Hemoglobin   Date Value Ref Range Status   2021 8.8 (L) 12.0 - 16.0 g/dL Final     CVS: RRR  Lungs: CTAB  ABD: Uterus firm at umbilicus  EXT: no c/c/e    23 y.o.  now P s/p vaginal delivery doing well PPD#2  1. Discharge pt to home  2. Rx Ibuprofen  3. Follow up with Dr. Anita Victoria 2wks virtually  4.  Nothing in the vagina no tub bathing and no intercourse  [employer], DO

## 2021-02-15 NOTE — PLAN OF CARE
Problem: Discharge Planning:  Goal: Discharged to appropriate level of care  Description: Discharged to appropriate level of care  2/14/2021 2151 by Howie Roque RN  Outcome: Ongoing  2/14/2021 1242 by Ronny Jansen RN  Outcome: Ongoing     Problem: Constipation:  Goal: Bowel elimination is within specified parameters  Description: Bowel elimination is within specified parameters  2/14/2021 2151 by Howie Roque RN  Outcome: Ongoing  2/14/2021 1242 by Ronny Jansen RN  Outcome: Ongoing     Problem: Mood - Altered:  Goal: Mood stable  Description: Mood stable  2/14/2021 2151 by Howie Roque RN  Outcome: Ongoing  2/14/2021 1242 by Ronny Jansen RN  Outcome: Ongoing     Problem: Pain - Acute:  Goal: Pain level will decrease  Description: Pain level will decrease  2/14/2021 2151 by Hwoie Roque RN  Outcome: Ongoing  2/14/2021 1242 by Ronny Jansen RN  Outcome: Met This Shift     Problem: Pain:  Goal: Pain level will decrease  Description: Pain level will decrease  2/14/2021 2151 by Howie Roque RN  Outcome: Ongoing  2/14/2021 1242 by Ronny Jansen RN  Outcome: Met This Shift  Goal: Control of acute pain  Description: Control of acute pain  2/14/2021 2151 by Howie Roque RN  Outcome: Ongoing  2/14/2021 1242 by Ronny Jansen RN  Outcome: Ongoing  Goal: Control of chronic pain  Description: Control of chronic pain  2/14/2021 2151 by Howie Roque RN  Outcome: Ongoing  2/14/2021 1242 by Ronny Jansen RN  Outcome: Ongoing

## 2021-02-24 ENCOUNTER — OFFICE VISIT (OUTPATIENT)
Dept: OBGYN CLINIC | Age: 21
End: 2021-02-24
Payer: COMMERCIAL

## 2021-02-24 PROCEDURE — 99441 PR PHYS/QHP TELEPHONE EVALUATION 5-10 MIN: CPT | Performed by: OBSTETRICS & GYNECOLOGY

## 2021-02-24 NOTE — PROGRESS NOTES
Anupama Botello is a 23 y.o. female evaluated via telephone on 2/24/2021. Consent:  She and/or health care decision maker is aware that that she may receive a bill for this telephone service, depending on her insurance coverage, and has provided verbal consent to proceed: Yes      Documentation:  I communicated with the patient and/or health care decision maker about postpartum. Details of this discussion including any medical advice provided: Patient presents virtually to discuss her postpartum. Patient is doing well bottlefeeding minimal vaginal bleeding no pelvic pain headaches blurred vision or shortness of breath. No postpartum edema she has good support. She occasionally feels overwhelmed but no depression      I affirm this is a Patient Initiated Episode with a Patient who has not had a related appointment within my department in the past 7 days or scheduled within the next 24 hours.     Patient identification was verified at the start of the visit: Yes    Total Time: minutes: 5-10 minutes    Note: not billable if this call serves to triage the patient into an appointment for the relevant concern      Gertie Babinski

## 2021-04-06 ENCOUNTER — POSTPARTUM VISIT (OUTPATIENT)
Dept: OBGYN CLINIC | Age: 21
End: 2021-04-06

## 2021-04-06 VITALS
DIASTOLIC BLOOD PRESSURE: 70 MMHG | WEIGHT: 152 LBS | SYSTOLIC BLOOD PRESSURE: 112 MMHG | HEART RATE: 81 BPM | HEIGHT: 64 IN | BODY MASS INDEX: 25.95 KG/M2

## 2021-04-06 PROCEDURE — G8419 CALC BMI OUT NRM PARAM NOF/U: HCPCS | Performed by: OBSTETRICS & GYNECOLOGY

## 2021-04-06 PROCEDURE — 0503F POSTPARTUM CARE VISIT: CPT | Performed by: OBSTETRICS & GYNECOLOGY

## 2021-04-06 PROCEDURE — 4004F PT TOBACCO SCREEN RCVD TLK: CPT | Performed by: OBSTETRICS & GYNECOLOGY

## 2021-04-06 PROCEDURE — G8427 DOCREV CUR MEDS BY ELIG CLIN: HCPCS | Performed by: OBSTETRICS & GYNECOLOGY

## 2021-04-07 NOTE — PROGRESS NOTES
SUBJECTIVE:   23 y.o.  here for post partum exam. Pt bottle feeding without difficulty. Pt with irregular VB since delivery and no complaints. Review of Systems:  General ROS: negative  Psychological ROS: negative  ENT ROS: negative  Endocrine ROS: negative  Respiratory ROS: no cough, shortness of breath, or wheezing  Cardiovascular ROS: no chest pain or dyspnea on exertion  Gastrointestinal ROS: no abdominal pain, change in bowel habits, or black or bloody stools  Genito-Urinary ROS: no dysuria, trouble voiding, or hematuria  Musculoskeletal ROS: negative  Neurological ROS: no TIA or stroke symptoms  Dermatological ROS: negative    OBJECTIVE:   /70 (Site: Right Upper Arm, Position: Sitting, Cuff Size: Medium Adult)   Pulse 81   Ht 5' 4\" (1.626 m)   Wt 152 lb (68.9 kg)   LMP 2021 (Exact Date)   BMI 26.09 kg/m²     Physical Exam:  GEN: She appears well, afebrile. HEENT: normal cephalic, atraumatic  CVS: regular rate and rhythm  ABDOMEN: benign, soft, nontender, no masses. MUSCULOSKELETAL: normal gait, no masses  SKIN: normal texture and tone, no lesions  NEURO: normal tone, no hyperreflexia, 1+DTRs throughout    Pelvic Exam:   EFG: normal external genitalia  URETHRA: normal appearing without diverticula or lesions  VULVA: normal appearing vulva with no masses, tenderness or lesions  VAGINA: normal rugae, no discharge   CERVIX: parous, no lesions  UTERUS: uterus is normal size, shape, consistency and nontender   ADNEXA: normal adnexa in size, nontender and no masses. PERINEUM: normal appearing without lesions or masses, well healed  ANUS: normal appearing without lesions or masses, no fissures or hemorrhoids    ASSESSMENT:   Post partum care and exam    PLAN:   LPS  Past medical, social and family history reviewed and updated in pt's chart. Post partum care reviewed with patient. Pt adjusting well to infant and denies any depressions sx.  Risks, benefits and alternative therapies for metrorrhagia discussed.  Pt desires nexplanon

## 2021-04-28 ENCOUNTER — PROCEDURE VISIT (OUTPATIENT)
Dept: OBGYN CLINIC | Age: 21
End: 2021-04-28
Payer: COMMERCIAL

## 2021-04-28 VITALS
WEIGHT: 151 LBS | HEART RATE: 84 BPM | SYSTOLIC BLOOD PRESSURE: 98 MMHG | DIASTOLIC BLOOD PRESSURE: 62 MMHG | BODY MASS INDEX: 25.78 KG/M2 | HEIGHT: 64 IN

## 2021-04-28 DIAGNOSIS — Z30.017 NEXPLANON INSERTION: ICD-10-CM

## 2021-04-28 DIAGNOSIS — N92.6 IRREGULAR MENSES: Primary | ICD-10-CM

## 2021-04-28 LAB
HCG, URINE, POC: NEGATIVE
Lab: NORMAL
NEGATIVE QC PASS/FAIL: NORMAL
POSITIVE QC PASS/FAIL: NORMAL

## 2021-04-28 PROCEDURE — 11981 INSERTION DRUG DLVR IMPLANT: CPT | Performed by: ADVANCED PRACTICE MIDWIFE

## 2021-04-28 PROCEDURE — 81025 URINE PREGNANCY TEST: CPT | Performed by: ADVANCED PRACTICE MIDWIFE

## 2021-04-28 RX ORDER — ETONOGESTREL 68 MG/1
IMPLANT SUBCUTANEOUS
COMMUNITY
Start: 2021-04-15 | End: 2021-10-29

## 2021-04-28 NOTE — PROGRESS NOTES
Nexplanon Insertion Procedure Note    BP 98/62 (Site: Right Upper Arm, Position: Sitting, Cuff Size: Small Adult)   Pulse 84   Ht 5' 4\" (1.626 m)   Wt 151 lb (68.5 kg)   LMP 04/25/2021 (Approximate)   BMI 25.92 kg/m²     Indications:  Irregular Periods     Pre-operative Diagnosis:  Irregular Periods     Post-operative Diagnosis: same    Procedure Details:   Procedure risks, complications, activity restrictions following procedure, possible adverse effects, and warning/danger signs to report immediately were all discussed in detail and patient participation/questions were encouraged. Following this discussion, informed consent was obtained. Urine pregnancy test was performed and the result was Negative. A time out was performed with patient participation. The patient was then positioned comfortably on the procedure table. From the left humerus epicondyl, a distance of 8cm was measured and marked. A sterile prep and drape was completed and 1% lidocaine was administered for local anesthetia. Using the applicator, the implant was inserted while utilizing a tenting motion. The implant was then palpated by the patient. A sterile dressing with a light pressure wrap was applied. Procedure was tolerated well. Nexplanon Information:  Angelene Denver number: [id number]   Expiration Date: 07/20/2023   NDC:  2977-4417-65    Condition:  Stable    Complications:  None    Plan:  The patient was advised to call for any fever or for prolonged or severe pain or bleeding. She was advised to use OTC ibuprofen or Tylenol as needed for mild to moderate pain.

## 2021-05-10 ENCOUNTER — OFFICE VISIT (OUTPATIENT)
Dept: OBGYN CLINIC | Age: 21
End: 2021-05-10
Payer: COMMERCIAL

## 2021-05-10 VITALS
BODY MASS INDEX: 25.92 KG/M2 | SYSTOLIC BLOOD PRESSURE: 100 MMHG | DIASTOLIC BLOOD PRESSURE: 64 MMHG | WEIGHT: 151 LBS | HEART RATE: 74 BPM

## 2021-05-10 DIAGNOSIS — B37.9 ANTIBIOTIC-INDUCED YEAST INFECTION: ICD-10-CM

## 2021-05-10 DIAGNOSIS — R82.90 ABNORMAL URINE FINDINGS: Primary | ICD-10-CM

## 2021-05-10 DIAGNOSIS — N30.01 ACUTE CYSTITIS WITH HEMATURIA: ICD-10-CM

## 2021-05-10 DIAGNOSIS — T36.95XA ANTIBIOTIC-INDUCED YEAST INFECTION: ICD-10-CM

## 2021-05-10 DIAGNOSIS — R82.90 ABNORMAL URINE FINDINGS: ICD-10-CM

## 2021-05-10 LAB
BACTERIA: ABNORMAL /HPF
BILIRUBIN URINE: NEGATIVE
BILIRUBIN, POC: NORMAL
BLOOD URINE, POC: NORMAL
BLOOD, URINE: ABNORMAL
CLARITY, POC: NORMAL
CLARITY: ABNORMAL
COLOR, POC: NORMAL
COLOR: YELLOW
EPITHELIAL CELLS, UA: ABNORMAL /HPF (ref 0–5)
GLUCOSE URINE, POC: NORMAL
GLUCOSE URINE: NEGATIVE MG/DL
HYALINE CASTS: ABNORMAL /HPF (ref 0–5)
KETONES, POC: NORMAL
KETONES, URINE: NEGATIVE MG/DL
LEUKOCYTE EST, POC: NORMAL
LEUKOCYTE ESTERASE, URINE: ABNORMAL
NITRITE, POC: POSITIVE
NITRITE, URINE: POSITIVE
PH UA: 5.5 (ref 5–9)
PH, POC: 6
PROTEIN UA: ABNORMAL MG/DL
PROTEIN, POC: NORMAL
RBC UA: ABNORMAL /HPF (ref 0–2)
SPECIFIC GRAVITY UA: 1.02 (ref 1–1.03)
SPECIFIC GRAVITY, POC: 1.02
UROBILINOGEN, POC: NORMAL
UROBILINOGEN, URINE: 0.2 E.U./DL
WBC UA: >100 /HPF (ref 0–5)

## 2021-05-10 PROCEDURE — 4004F PT TOBACCO SCREEN RCVD TLK: CPT | Performed by: ADVANCED PRACTICE MIDWIFE

## 2021-05-10 PROCEDURE — 81003 URINALYSIS AUTO W/O SCOPE: CPT | Performed by: ADVANCED PRACTICE MIDWIFE

## 2021-05-10 PROCEDURE — G8419 CALC BMI OUT NRM PARAM NOF/U: HCPCS | Performed by: ADVANCED PRACTICE MIDWIFE

## 2021-05-10 PROCEDURE — G8427 DOCREV CUR MEDS BY ELIG CLIN: HCPCS | Performed by: ADVANCED PRACTICE MIDWIFE

## 2021-05-10 PROCEDURE — 99214 OFFICE O/P EST MOD 30 MIN: CPT | Performed by: ADVANCED PRACTICE MIDWIFE

## 2021-05-10 RX ORDER — FLUCONAZOLE 150 MG/1
TABLET ORAL
Qty: 3 TABLET | Refills: 1 | Status: SHIPPED | OUTPATIENT
Start: 2021-05-10 | End: 2021-10-29

## 2021-05-10 RX ORDER — NITROFURANTOIN 25; 75 MG/1; MG/1
100 CAPSULE ORAL 2 TIMES DAILY
Qty: 14 CAPSULE | Refills: 0 | Status: SHIPPED | OUTPATIENT
Start: 2021-05-10 | End: 2021-05-17

## 2021-05-10 ASSESSMENT — ENCOUNTER SYMPTOMS
SHORTNESS OF BREATH: 0
NAUSEA: 0
COUGH: 0
CONSTIPATION: 0
VOMITING: 0
ABDOMINAL PAIN: 0
BACK PAIN: 0
DIARRHEA: 0

## 2021-05-10 NOTE — PROGRESS NOTES
Genitourinary: Positive for difficulty urinating, dysuria, frequency, hematuria, pelvic pain and urgency. Negative for flank pain, genital sores, menstrual problem, vaginal bleeding and vaginal discharge. Musculoskeletal: Negative for back pain. All other systems reviewed and are negative. Physical Exam:     Vitals:  /64   Pulse 74   Wt 151 lb (68.5 kg)   LMP 04/25/2021 (Approximate)   BMI 25.92 kg/m²     Physical Exam  Constitutional:       General: She is not in acute distress. Appearance: Normal appearance. She is not ill-appearing. HENT:      Mouth/Throat:      Mouth: Mucous membranes are moist.   Eyes:      General: No scleral icterus. Right eye: No discharge. Left eye: No discharge. Neck:      Musculoskeletal: Normal range of motion and neck supple. Cardiovascular:      Rate and Rhythm: Normal rate. Pulmonary:      Effort: Pulmonary effort is normal. No respiratory distress. Abdominal:      Palpations: Abdomen is soft. Tenderness: There is no right CVA tenderness or left CVA tenderness. Musculoskeletal: Normal range of motion. Right lower leg: No edema. Left lower leg: No edema. Skin:     General: Skin is warm and dry. Capillary Refill: Capillary refill takes less than 2 seconds. Coloration: Skin is not jaundiced or pale. Neurological:      Mental Status: She is alert and oriented to person, place, and time. Mental status is at baseline. Psychiatric:         Mood and Affect: Mood normal.         Behavior: Behavior normal.       Assessment:      Diagnosis Orders   1. Abnormal urine findings  Culture, Urine    Urinalysis    POCT Urinalysis No Micro (Auto)   2. Acute cystitis with hematuria  nitrofurantoin, macrocrystal-monohydrate, (MACROBID) 100 MG capsule   3. Antibiotic-induced yeast infection  fluconazole (DIFLUCAN) 150 MG tablet     Plan:     1. Possible UTI  UA - positive, sent for Culture  Rx for Macrobid    2.  History of

## 2021-05-13 LAB
ORGANISM: ABNORMAL
URINE CULTURE, ROUTINE: ABNORMAL

## 2021-10-26 RX ORDER — VALACYCLOVIR HYDROCHLORIDE 500 MG/1
500 TABLET, FILM COATED ORAL DAILY
Qty: 30 TABLET | Refills: 3 | Status: SHIPPED | OUTPATIENT
Start: 2021-10-26

## 2021-10-29 ENCOUNTER — PROCEDURE VISIT (OUTPATIENT)
Dept: OBGYN CLINIC | Age: 21
End: 2021-10-29
Payer: COMMERCIAL

## 2021-10-29 VITALS — HEIGHT: 65 IN | WEIGHT: 152 LBS | BODY MASS INDEX: 25.33 KG/M2

## 2021-10-29 DIAGNOSIS — Z30.46 ENCOUNTER FOR REMOVAL OF SUBDERMAL CONTRACEPTIVE IMPLANT: Primary | ICD-10-CM

## 2021-10-29 DIAGNOSIS — N92.1 BREAKTHROUGH BLEEDING ON NEXPLANON: ICD-10-CM

## 2021-10-29 DIAGNOSIS — R53.83 OTHER FATIGUE: ICD-10-CM

## 2021-10-29 DIAGNOSIS — Z97.5 BREAKTHROUGH BLEEDING ON NEXPLANON: ICD-10-CM

## 2021-10-29 PROCEDURE — G8427 DOCREV CUR MEDS BY ELIG CLIN: HCPCS | Performed by: ADVANCED PRACTICE MIDWIFE

## 2021-10-29 PROCEDURE — G8484 FLU IMMUNIZE NO ADMIN: HCPCS | Performed by: ADVANCED PRACTICE MIDWIFE

## 2021-10-29 PROCEDURE — 99214 OFFICE O/P EST MOD 30 MIN: CPT | Performed by: ADVANCED PRACTICE MIDWIFE

## 2021-10-29 PROCEDURE — 4004F PT TOBACCO SCREEN RCVD TLK: CPT | Performed by: ADVANCED PRACTICE MIDWIFE

## 2021-10-29 PROCEDURE — 11982 REMOVE DRUG IMPLANT DEVICE: CPT | Performed by: ADVANCED PRACTICE MIDWIFE

## 2021-10-29 PROCEDURE — G8419 CALC BMI OUT NRM PARAM NOF/U: HCPCS | Performed by: ADVANCED PRACTICE MIDWIFE

## 2021-10-29 ASSESSMENT — ENCOUNTER SYMPTOMS
VOMITING: 0
NAUSEA: 0
SHORTNESS OF BREATH: 0
CONSTIPATION: 0
ABDOMINAL PAIN: 0
COUGH: 0
DIARRHEA: 0

## 2021-10-29 NOTE — PROGRESS NOTES
Nexplanon Removal Procedure Note    Indications:  Breakthrough Bleeding, Increased Fatigue     Pre-operative Diagnosis:  Breakthrough Bleeding with Nexplanon, Fatigue since Nexplanon insertion     Post-operative Diagnosis: same    Procedure Details:   Procedure risks, complications, activity restrictions following procedure, possible adverse effects, and warning/danger signs to report immediately were all discussed in detail and patient participation/questions were encouraged. Following this discussion, informed consent was obtained. A time out was performed with patient participation. The patient was then positioned comfortably on the procedure table. The implant was located and palpated in the Right upper arm. A sterile prep and drape was completed and 1% lidocaine was administered for local anesthetia. A small incision was made into the skin just beyond the proximal tip of the implant. Upon visualization of the implant, it was grasped, removed intact, and shown to the patient for visual confirmation of its complete and intact removal.    Condition:  Stable    Complications:  None    Plan:  The patient was advised to call for any fever or for prolonged or severe pain or bleeding. She was advised to use OTC ibuprofen or Tylenol as needed for mild to moderate pain.

## 2021-10-29 NOTE — PROGRESS NOTES
A timeout was performed immediately prior to the start of the Nexplanon removal procedure and included the correct patient (two identifiers), correct procedure and correct site(s). Procedure consent and allergies were also verified.

## 2021-10-29 NOTE — PROGRESS NOTES
SUBJECTIVE:  Melina Monge is a 19 y.o. female who presents here today for complaints of:      Chief Complaint   Patient presents with    Procedure     est patient in today to have Nexplanon removed from right arm. Implant placed 4-2021, and patient with fatigue, and interest in future pregnancy     Breakthrough Bleeding and Fatigue with Nexplanon   Utilizing hormonal contraception to relieve uncomfortable menstrual symptoms. Current Contraceptive Method:  Nexplanon  Considering pregnancy within the next 12 months:  No  Is there a possibility you could be pregnant? No  Experiencing bleeding and increased fatigue with current contraceptive method, does not wish to continue. Following a discussion of expected changes in menstrual bleeding, possible side effects, non-contraceptive benefits, and the effect on future fertility, wishes to transition to Condoms    Review of Systems   Constitutional: Positive for fatigue. Respiratory: Negative for cough and shortness of breath. Gastrointestinal: Negative for abdominal pain, constipation, diarrhea, nausea and vomiting. Genitourinary: Positive for menstrual problem. Negative for difficulty urinating, dysuria, pelvic pain, vaginal bleeding and vaginal discharge. All other systems reviewed and are negative. OBJECTIVE:  Vitals:  Ht 5' 5\" (1.651 m)   Wt 152 lb (68.9 kg)   BMI 25.29 kg/m²     Physical Exam  Appearance:  Normal appearance  Cardiovascular:  Normal rate, Capillary refill less than 2 seconds  Pulmonary:  Normal effort, no distress  Abdominal:  No tenderness  MS:  No Swelling, No dependent edema  Skin:  Warm, dry  Neuro:  Alert and oriented x3, reflexes normal.  Psychiatric:  Normal mood and behavior    ASSESSMENT & PLAN:   Diagnosis Orders   1. Encounter for removal of subdermal contraceptive implant  KS REMOVAL DRUG IMPLANT DEVICE   2. Breakthrough bleeding on Nexplanon     3. Other fatigue         1.  Breakthrough Bleeding and Fatigue with Nexplanon  Experiencing frequent, prolonged bleeding with Nexplanon  Experiencing increased fatigue since Nexplanon placement  Requesting device removal, see procedure note. Return in about 1 week (around 11/5/2021) for Annual Well Woman Visit, Nexplanon removal site check.     GENNARO Feliz - CARIE

## 2022-01-12 ENCOUNTER — OFFICE VISIT (OUTPATIENT)
Dept: OBGYN CLINIC | Age: 22
End: 2022-01-12
Payer: COMMERCIAL

## 2022-01-12 VITALS
SYSTOLIC BLOOD PRESSURE: 106 MMHG | WEIGHT: 153 LBS | BODY MASS INDEX: 26.12 KG/M2 | HEIGHT: 64 IN | HEART RATE: 78 BPM | DIASTOLIC BLOOD PRESSURE: 70 MMHG

## 2022-01-12 DIAGNOSIS — N91.1 SECONDARY AMENORRHEA: Primary | ICD-10-CM

## 2022-01-12 DIAGNOSIS — N91.1 SECONDARY AMENORRHEA: ICD-10-CM

## 2022-01-12 DIAGNOSIS — Z32.01 PREGNANCY CONFIRMED BY POSITIVE URINE TEST: ICD-10-CM

## 2022-01-12 PROCEDURE — G8419 CALC BMI OUT NRM PARAM NOF/U: HCPCS | Performed by: OBSTETRICS & GYNECOLOGY

## 2022-01-12 PROCEDURE — 99214 OFFICE O/P EST MOD 30 MIN: CPT | Performed by: OBSTETRICS & GYNECOLOGY

## 2022-01-12 PROCEDURE — G8427 DOCREV CUR MEDS BY ELIG CLIN: HCPCS | Performed by: OBSTETRICS & GYNECOLOGY

## 2022-01-12 PROCEDURE — 1036F TOBACCO NON-USER: CPT | Performed by: OBSTETRICS & GYNECOLOGY

## 2022-01-12 PROCEDURE — G8484 FLU IMMUNIZE NO ADMIN: HCPCS | Performed by: OBSTETRICS & GYNECOLOGY

## 2022-01-12 RX ORDER — ASCORBIC ACID, CHOLECALCIFEROL, .ALPHA.-TOCOPHEROL ACETATE, DL-, PYRIDOXINE HYDROCHLORIDE, FOLIC ACID, CYANOCOBALAMIN, BIOTIN, CALCIUM CARBONATE, FERROUS ASPARTO GLYCINATE, IRON, POTASSIUM IODIDE, MAGNESIUM OXIDE, DOCONEXENT AND LOWBUSH BLUEBERRY 60; 1000; 10; 26; 400; 13; 280; 80; 9; 9; 150; 25; 350; 25; 600 MG/1; [IU]/1; [IU]/1; MG/1; UG/1; UG/1; UG/1; MG/1; MG/1; MG/1; UG/1; MG/1; MG/1; MG/1; UG/1
1 CAPSULE, GELATIN COATED ORAL DAILY
Qty: 30 CAPSULE | Refills: 5 | Status: SHIPPED | OUTPATIENT
Start: 2022-01-12

## 2022-01-12 RX ORDER — ONDANSETRON 4 MG/1
4 TABLET, ORALLY DISINTEGRATING ORAL 3 TIMES DAILY PRN
Qty: 21 TABLET | Refills: 3 | Status: ON HOLD | OUTPATIENT
Start: 2022-01-12 | End: 2022-08-16 | Stop reason: HOSPADM

## 2022-01-12 ASSESSMENT — ENCOUNTER SYMPTOMS
ABDOMINAL PAIN: 0
APNEA: 0
SHORTNESS OF BREATH: 0

## 2022-01-12 NOTE — PROGRESS NOTES
Subjective:      Patient ID:  Clarisa Peoples is a 19 y.o. female with chief complaint of:  Chief Complaint   Patient presents with    Amenorrhea     LMP unknown        Patient presents for confirmation of pregnancy she states her last menstrual period was unknown due to recent discontinuation of birth control. Patient denies any vaginal bleeding or spotting or cramping she has having some nausea with occasional vomiting she desires something for it      Past Medical History:   Diagnosis Date    Herpes simplex virus (HSV) infection      No past surgical history on file. Family History   Problem Relation Age of Onset    Cancer Sister         leukemia    Heart Disease Maternal Uncle      Current Outpatient Medications on File Prior to Visit   Medication Sig Dispense Refill    valACYclovir (VALTREX) 500 MG tablet Take 1 tablet by mouth daily 30 tablet 3     No current facility-administered medications on file prior to visit. Allergies:  Pcn [penicillins]    Review of Systems   Constitutional: Negative for fatigue and fever. Respiratory: Negative for apnea and shortness of breath. Cardiovascular: Negative for chest pain and palpitations. Gastrointestinal: Negative for abdominal pain. Genitourinary: Negative for difficulty urinating, dysuria, pelvic pain, vaginal bleeding and vaginal discharge. Neurological: Negative for dizziness, weakness and light-headedness. Psychiatric/Behavioral: Negative for agitation and dysphoric mood. Objective:   /70   Pulse 78   Ht 5' 4\" (1.626 m)   Wt 153 lb (69.4 kg)   LMP  (LMP Unknown)   BMI 26.26 kg/m²      Physical Exam  Constitutional:       General: She is not in acute distress. Appearance: She is well-developed. She is not diaphoretic. HENT:      Head: Normocephalic. Eyes:      Conjunctiva/sclera: Conjunctivae normal.      Pupils: Pupils are equal, round, and reactive to light. Neck:      Thyroid: No thyromegaly.       Trachea: No tracheal deviation. Cardiovascular:      Rate and Rhythm: Normal rate and regular rhythm. Heart sounds: Normal heart sounds. No murmur heard. No friction rub. No gallop. Pulmonary:      Effort: Pulmonary effort is normal. No respiratory distress. Breath sounds: Normal breath sounds. No wheezing or rales. Chest:      Chest wall: No tenderness. Abdominal:      General: Bowel sounds are normal. There is no distension. Palpations: Abdomen is soft. There is no mass. Tenderness: There is no abdominal tenderness. There is no guarding or rebound. Genitourinary:     Labia:         Right: No rash, tenderness or lesion. Left: No rash, tenderness or lesion. Vagina: Normal. No vaginal discharge, erythema, tenderness or bleeding. Cervix: No cervical motion tenderness, discharge or friability. Uterus: Not deviated, not enlarged, not fixed and not tender. Adnexa:         Right: No mass, tenderness or fullness. Left: No mass, tenderness or fullness. Comments: Uterus is not prolapsed and there is not a cystocele or rectocele noted  Skin:     General: Skin is warm and dry. Neurological:      Mental Status: She is alert and oriented to person, place, and time. Cranial Nerves: No cranial nerve deficit. Deep Tendon Reflexes: Reflexes normal.   Psychiatric:         Behavior: Behavior normal.         Judgment: Judgment normal.         Assessment:       Diagnosis Orders   1.  Secondary amenorrhea  C.trachomatis N.gonorrhoeae DNA    CBC Auto Differential    Hemoglobinopathy Eval (Electrophoresis)    Hepatitis B Surface Antigen    HIV Screen    RPR Reflex to Titer and TPPA    Rubella antibody, IgG    Type and screen    Urinalysis    Varicella Zoster Antibody, IgG    Wet prep, genital    Culture, Urine    Drug Panel 9A Screen, Urine    Comprehensive Metabolic Panel    ondansetron (ZOFRAN-ODT) 4 MG disintegrating tablet    HCG, Quantitative, Pregnancy PAP SMEAR    Rzoqbl-ReHmg-WkKyy-Meth-FA-DHA (PRENATE MINI) 18-0.6-0.4-350 MG CAPS    US OB TRANSVAGINAL    US OB LESS THAN 14 WEEKS SINGLE OR FIRST GESTATION   2. Pregnancy confirmed by positive urine test  C.trachomatis N.gonorrhoeae DNA    CBC Auto Differential    Hemoglobinopathy Eval (Electrophoresis)    Hepatitis B Surface Antigen    HIV Screen    RPR Reflex to Titer and TPPA    Rubella antibody, IgG    Type and screen    Urinalysis    Varicella Zoster Antibody, IgG    Wet prep, genital    Culture, Urine    Drug Panel 9A Screen, Urine    Comprehensive Metabolic Panel    ondansetron (ZOFRAN-ODT) 4 MG disintegrating tablet    HCG, Quantitative, Pregnancy    PAP SMEAR    Dpqeii-HeMdg-UbKkp-Meth-FA-DHA (PRENATE MINI) 18-0.6-0.4-350 MG CAPS    US OB TRANSVAGINAL    US OB LESS THAN 14 WEEKS SINGLE OR FIRST GESTATION         Plan:      Orders Placed This Encounter   Procedures    C.trachomatis N.gonorrhoeae DNA     Standing Status:   Future     Number of Occurrences:   1     Standing Expiration Date:   1/12/2023    Wet prep, genital     Standing Status:   Future     Number of Occurrences:   1     Standing Expiration Date:   1/12/2023    Culture, Urine     Standing Status:   Future     Standing Expiration Date:   1/12/2023     Order Specific Question:   Specify (ex-cath, midstream, cysto, etc)? Answer:   Midstream    US OB TRANSVAGINAL     This procedure can be scheduled via [facility]. Access your [facility] account by visiting [email or web address]. Standing Status:   Future     Standing Expiration Date:   1/12/2023    US OB LESS THAN 14 WEEKS SINGLE OR FIRST GESTATION     This procedure can be scheduled via [employer]. Access your [facility] account by visiting [email or web address].      Standing Status:   Future     Standing Expiration Date:   1/12/2023    CBC Auto Differential     Standing Status:   Future     Standing Expiration Date:   1/12/2023    Hemoglobinopathy Eval (Electrophoresis)     Standing Status:   Future Standing Expiration Date:   1/12/2023    Hepatitis B Surface Antigen     Standing Status:   Future     Standing Expiration Date:   1/12/2023    HIV Screen     Standing Status:   Future     Standing Expiration Date:   1/12/2023    RPR Reflex to Titer and TPPA     Standing Status:   Future     Standing Expiration Date:   1/12/2023    Rubella antibody, IgG     Standing Status:   Future     Standing Expiration Date:   1/12/2023    Urinalysis     Standing Status:   Future     Standing Expiration Date:   1/12/2023    Varicella Zoster Antibody, IgG     Standing Status:   Future     Standing Expiration Date:   1/12/2023    Drug Panel 9A Screen, Urine     Standing Status:   Future     Standing Expiration Date:   1/12/2023    Comprehensive Metabolic Panel     Standing Status:   Future     Standing Expiration Date:   1/12/2023    HCG, Quantitative, Pregnancy     Standing Status:   Future     Standing Expiration Date:   1/12/2023    PAP SMEAR     Standing Status:   Future     Number of Occurrences:   1     Standing Expiration Date:   1/12/2023     Order Specific Question:   Collection Type     Answer: Thin Prep     Order Specific Question:   Prior Abnormal Pap Test     Answer:   No     Order Specific Question:   Screening or Diagnostic     Answer:   Screening     Order Specific Question:   HPV Requested?      Answer:   Yes -  If ASCUS Reflex HPV     Order Specific Question:   High Risk Patient     Answer:   N/A    Type and screen     Standing Status:   Future     Standing Expiration Date:   1/12/2023     Orders Placed This Encounter   Medications    ondansetron (ZOFRAN-ODT) 4 MG disintegrating tablet     Sig: Take 1 tablet by mouth 3 times daily as needed for Nausea or Vomiting     Dispense:  21 tablet     Refill:  3    Zbcybv-OtYor-FzRop-Meth-FA-DHA (PRENATE MINI) 18-0.6-0.4-350 MG CAPS     Sig: Take 1 tablet by mouth daily     Dispense:  30 capsule     Refill:  5       Return in about 3 weeks (around 2/2/2022) for routine PNV.      Julia Miller, DO

## 2022-01-14 ENCOUNTER — HOSPITAL ENCOUNTER (OUTPATIENT)
Dept: ULTRASOUND IMAGING | Age: 22
Discharge: HOME | End: 2022-01-16
Payer: COMMERCIAL

## 2022-01-14 DIAGNOSIS — N91.1 SECONDARY AMENORRHEA: ICD-10-CM

## 2022-01-14 DIAGNOSIS — Z32.01 PREGNANCY CONFIRMED BY POSITIVE URINE TEST: ICD-10-CM

## 2022-01-14 PROCEDURE — 76801 OB US < 14 WKS SINGLE FETUS: CPT

## 2022-01-18 DIAGNOSIS — A74.9 CHLAMYDIA INFECTION: Primary | ICD-10-CM

## 2022-01-18 LAB
C TRACH DNA GENITAL QL NAA+PROBE: POSITIVE
N. GONORRHOEAE DNA: NEGATIVE

## 2022-01-18 RX ORDER — AZITHROMYCIN 500 MG/1
1000 TABLET, FILM COATED ORAL ONCE
Qty: 2 TABLET | Refills: 0 | Status: SHIPPED | OUTPATIENT
Start: 2022-01-18 | End: 2022-01-18

## 2022-01-23 ENCOUNTER — HOSPITAL ENCOUNTER (EMERGENCY)
Age: 22
Discharge: HOME | End: 2022-01-23
Attending: STUDENT IN AN ORGANIZED HEALTH CARE EDUCATION/TRAINING PROGRAM
Payer: COMMERCIAL

## 2022-01-23 VITALS
BODY MASS INDEX: 25.49 KG/M2 | OXYGEN SATURATION: 99 % | TEMPERATURE: 97 F | RESPIRATION RATE: 18 BRPM | HEART RATE: 74 BPM | DIASTOLIC BLOOD PRESSURE: 80 MMHG | HEIGHT: 65 IN | WEIGHT: 153 LBS | SYSTOLIC BLOOD PRESSURE: 114 MMHG

## 2022-01-23 DIAGNOSIS — O20.0 THREATENED MISCARRIAGE IN EARLY PREGNANCY: Primary | ICD-10-CM

## 2022-01-23 DIAGNOSIS — N93.9 VAGINAL BLEEDING: ICD-10-CM

## 2022-01-23 PROCEDURE — 99283 EMERGENCY DEPT VISIT LOW MDM: CPT

## 2022-01-23 ASSESSMENT — ENCOUNTER SYMPTOMS
EYE PAIN: 0
DIARRHEA: 0
RHINORRHEA: 0
NAUSEA: 0
BACK PAIN: 0
SORE THROAT: 0
ABDOMINAL PAIN: 0
VOMITING: 0
COUGH: 0
SHORTNESS OF BREATH: 0

## 2022-01-23 ASSESSMENT — PAIN SCALES - GENERAL: PAINLEVEL_OUTOF10: 0

## 2022-01-23 NOTE — ED TRIAGE NOTES
Pt states she is 10 weeks pregnant. Pt c/o vaginal bleeding after having sex tonight. Pt did have slight cramping but denies pain at this time. Pt is a/o x 4 calm, skin p/w/d resp even and non-labored.  No sob or acute distress noted

## 2022-01-23 NOTE — ED PROVIDER NOTES
Assessment and Plan  Joellen was seen today for shaking.    Diagnoses and all orders for this visit:    Tremor of both hands  Comments:  new problem -- appears benign- reassured, to follow.    Screening mammogram, encounter for  -     Mammo Screening Digital Tomosynthesis Bilateral With CAD; Future    Osteopenia of multiple sites  Comments:  due for dexa scan  Orders:  -     DEXA Bone Density Axial    Other hyperlipidemia  -     Comprehensive Metabolic Panel; Future  -     Lipid Panel With LDL / HDL Ratio; Future  -     TSH; Future    Antiphospholipid antibody syndrome (CMS/HCC)  Comments:  INR done, change warfarin as per flowsheet.  Orders:  -     CBC & Differential; Future      F/U and Patient Instructions    Return in about 3 months (around 4/8/2020) for Medicare Wellness.  There are no Patient Instructions on file for this visit.    Subjective    Joellen Dominguez is a 73 y.o. female being seen in our office today for Shaking (hands/ arms)     History of the Present Illness  HPI  Has noticed her hands starting to shake R> L   First noticed when she was holding the TV remote, now thinks she can feel it in her shoulders when she lies down sometimes.  Does not drink any alcohol and does not remember her parents having a tremor.  Changing position can calm it.  Finds that her handwriting has changed- more difficult to write- but not because of hands shaking.   Currently warfarin is MWF 5 mg and TTSS 2.5 mg.       Patient History        Significant Past History  The following portions of the patient's history were reviewed and updated as appropriate:PMHroutine: Social history , Allergies, Current Medications, Active Problem List and Health Maintenance              Social History  She  reports that she quit smoking about 53 years ago. Her smoking use included cigarettes. She has a 16.00 pack-year smoking history. She does not have any smokeless tobacco history on file. She reports that she does not drink alcohol or use  [facility] ED  eMERGENCY dEPARTMENT eNCOUnter      Pt Name: Amanda Jim  MRN: [id number]  [place] 2000  Date of evaluation: 2022  Provider: Epi Ceja MD      HISTORY OF PRESENT ILLNESS      Chief Complaint   Patient presents with    Vaginal Bleeding     pt is 10 weeks pregnant and started bleeding tonight after sex       The history is provided by the Patient. Amanda Jim is a 19 y.o.  female with a PMH clinically significant for HSV and previous miscarriage presenting to the ED via PV c/o single episode of vaginal bleeding status post vaginal intercourse tonight. States initially associated with some mild abdominal cramping, but no real pain. No cramping or pain at this time. Also denies any associated vaginal discharge, dysuria, flank pain, N/V/D/C. States she is otherwise been feeling well. States initial large amount of vaginal bleeding, but slowly stopped after using the restroom. Does not think that she is bleeding currently. States history of miscarriage in the past and is concerned that she is having another miscarriage. Taking all medications as indicated. Denies any Tobacco, etoh or illicit substance abuse. Per Chart Review: Most recent OB ultrasound on 2022 showing SL IUP at approximately 8 weeks 6 days gestational age. Most recent OB eval on 22 appreciated. Bimanual exam unremarkable. REVIEW OF SYSTEMS       Review of Systems   Constitutional: Negative for chills and fever. HENT: Negative for rhinorrhea and sore throat. Eyes: Negative for pain and visual disturbance. Respiratory: Negative for cough and shortness of breath. Cardiovascular: Negative for chest pain and palpitations. Gastrointestinal: Negative for abdominal pain, diarrhea, nausea and vomiting. Genitourinary: Positive for vaginal bleeding. Negative for decreased urine volume, difficulty urinating, dysuria, flank pain, vaginal discharge and vaginal pain. Musculoskeletal: Negative for back pain and neck pain. Skin: Negative for rash. Neurological: Negative for weakness, numbness and headaches. Psychiatric/Behavioral: The patient is nervous/anxious. PAST MEDICAL HISTORY     Past Medical History:   Diagnosis Date    Herpes simplex virus (HSV) infection        SURGICAL HISTORY     No past surgical history on file. FAMILY HISTORY       Family History   Problem Relation Age of Onset    Cancer Sister         leukemia    Heart Disease Maternal Uncle        SOCIAL HISTORY       Social History     Socioeconomic History    Marital status: Single     Spouse name: Not on file    Number of children: Not on file    Years of education: Not on file    Highest education level: Not on file   Occupational History    Not on file   Tobacco Use    Smoking status: Former Smoker     Packs/day: 0.25     Years: 1.00     Pack years: 0.25     Types: Cigarettes    Smokeless tobacco: Never Used    Tobacco comment: every couple days I may have a cig   Vaping Use    Vaping Use: Never used   Substance and Sexual Activity    Alcohol use: Not Currently     Alcohol/week: 0.0 standard drinks     Comment: soacially before pregancy    Drug use: No    Sexual activity: Yes     Partners: Male     Birth control/protection: Implant     Comment: Nexplanon   Other Topics Concern    Not on file   Social History Narrative    Not on file     Social Determinants of Health     Financial Resource Strain:     Difficulty of Paying Living Expenses: Not on file   Food Insecurity:     Worried About [employer] [employer] in the Last Year: Not on file    [employer] in the Last Year: Not on file   Transportation Needs:     Lack of Transportation (Medical): Not on file    Lack of Transportation (Non-Medical):  Not on file   Physical Activity:     Days of Exercise per Week: Not on file    Minutes of Exercise per Session: Not on file   Stress:     Feeling of Stress : Not on file drugs.                         Review of Symptoms  Review of Systems   Constitution: Negative.   Cardiovascular: Negative.    Respiratory: Negative.    Endocrine: Negative.    Musculoskeletal: Negative.    Neurological: Positive for tremors. Negative for dizziness and focal weakness.     Objective  Vital Signs         BP Readings from Last 1 Encounters:   01/08/20 134/70     Wt Readings from Last 3 Encounters:   01/08/20 72.6 kg (160 lb)   12/06/19 70.3 kg (155 lb)   04/29/19 71.2 kg (157 lb)   Body mass index is 25.06 kg/m².          Physical Exam   Physical Exam   Constitutional: No distress.   Cardiovascular: Normal rate.   Neurological: Tremors: fine, B hands.   Psychiatric: She has a normal mood and affect. Her behavior is normal.     Data Reviewed    Recent Results (from the past 2016 hour(s))   POC INR    Collection Time: 12/06/19 10:44 AM   Result Value Ref Range    INR 5.80 (A) 0.9 - 1.1   POC INR    Collection Time: 12/09/19 12:00 AM   Result Value Ref Range    INR 1.80 (A) 0.9 - 1.1   POCT INR    Collection Time: 12/09/19 11:36 AM   Result Value Ref Range    INR 1.8 (A) 0.9 - 1.1   POC INR    Collection Time: 12/23/19 11:13 AM   Result Value Ref Range    INR 1.30 (A) 0.9 - 1.1            Social Connections:     Frequency of Communication with Friends and Family: Not on file    Frequency of Social Gatherings with Friends and Family: Not on file    Attends Cheondoism Services: Not on file    Active Member of Clubs or Organizations: Not on file    Attends Club or Organization Meetings: Not on file    Marital Status: Not on file   Intimate Partner Violence:     Fear of Current or Ex-Partner: Not on file    Emotionally Abused: Not on file    Physically Abused: Not on file    Sexually Abused: Not on file   Housing Stability:     Unable to Pay for Housing in the Last Year: Not on file    Number of [place] in the Last Year: Not on file    Unstable Housing in the Last Year: Not on file       CURRENT MEDICATIONS       Previous Medications    ONDANSETRON (ZOFRAN-ODT) 4 MG DISINTEGRATING TABLET    Take 1 tablet by mouth 3 times daily as needed for Nausea or Vomiting    EAXQGQ-RMWHN-CACMP-METH-FA-DHA (PRENATE MINI) 18-0.6-0.4-350 MG CAPS    Take 1 tablet by mouth daily    VALACYCLOVIR (VALTREX) 500 MG TABLET    Take 1 tablet by mouth daily       ALLERGIES     Pcn [penicillins]      PHYSICAL EXAM       ED Triage Vitals [01/23/22 0154]   BP Temp Temp Source Pulse Resp SpO2 Height Weight   114/80 97 °F (36.1 °C) Oral 74 18 99 % 5' 5\" (1.651 m) 153 lb (69.4 kg)       Physical Exam  Vitals and nursing note reviewed. Constitutional:       General: She is not in acute distress. HENT:      Head: Normocephalic and atraumatic. Mouth/Throat:      Mouth: Mucous membranes are moist.      Pharynx: Oropharynx is clear. Eyes:      Extraocular Movements: Extraocular movements intact. Pupils: Pupils are equal, round, and reactive to light. Cardiovascular:      Rate and Rhythm: Normal rate and regular rhythm. Pulses: Normal pulses. Heart sounds: Normal heart sounds. Pulmonary:      Effort: Pulmonary effort is normal. No respiratory distress. Breath sounds: Normal breath sounds. Abdominal:      General: There is no distension. Palpations: Abdomen is soft. Tenderness: There is no abdominal tenderness. There is no right CVA tenderness, left CVA tenderness, guarding or rebound. Musculoskeletal:         General: No tenderness. Cervical back: Normal range of motion and neck supple. Right lower leg: No edema. Left lower leg: No edema. Skin:     General: Skin is warm and dry. Capillary Refill: Capillary refill takes less than 2 seconds. Neurological:      General: No focal deficit present. Mental Status: She is alert and oriented to person, place, and time. Psychiatric:         Mood and Affect: Mood is anxious. Behavior: Behavior normal.         MDM:   Chart Reviewed: PMH and additional information as noted in HPI obtained from chart review    Vitals:    Vitals:    01/23/22 0154   BP: 114/80   Pulse: 74   Resp: 18   Temp: 97 °F (36.1 °C)   TempSrc: Oral   SpO2: 99%   Weight: 153 lb (69.4 kg)   Height: 5' 5\" (1.651 m)       PROCEDURES:  Unless otherwise noted below, none  Procedures    LABS:  Labs Reviewed - No data to display    No orders to display            19 y.o. female with a PMH clinically significant for HSV and previous miscarriage presenting to the ED via PV c/o single episode of vaginal bleeding status post vaginal intercourse tonight. Upon initial evaluation, Pt Afebrile, HDS and in NAD. PE as noted above. Bedside transabdominal US showing SLIUP with appreciable cardiac activity measured to be 158bpm.  Given findings, clinical presentation most likely consistent w/ threatened miscarriage in first trimester pregnancy with single episode of vaginal bleeding status post intercourse. Although considered, lower suspicion for vaginal trauma given bleeding rapidly ceased prior to arrival.  Patient also not noting history of significant vaginal trauma.   Also lower suspicion for ectopic pregnancy given most recent ultrasound showing no evidence of ectopic pregnancy. Benign abdominal exam.  Quick bedside ultrasound showing positive cardiac activity. Patient was still made aware that this could be an impending miscarriage. Patient stated understanding and that she would follow-up with her OB this Wednesday at previously scheduled appointment. Also recommended return to the ED if any new or worsening symptoms. O+, no indications to administer RhoGAM.  Given findings, stable for further evaluation management as an outpatient. Pt was administered Medications - No data to display    Plan: Discharge home in good condition with meds as noted below and instructions to follow up with PCP and OB. Pt stable and appropriate for further evaluation and management as an outpatient. and Patient understanding and amenable to the POC. CRITICAL CARE TIME   Total CriticalCare time was 0 minutes, excluding separately reportable procedures. There was a high probability of clinically significant/life threatening deterioration in the patient's condition which required my urgent intervention. FINAL IMPRESSION      1. Threatened miscarriage in early pregnancy    2.  Vaginal bleeding          DISPOSITION/PLAN   DISPOSITION Decision To Discharge 01/23/2022 02:28:34 AM      Current Discharge Medication List           MD Chencho Brizuela MD  01/23/22 5196

## 2022-01-23 NOTE — ED NOTES
Pt understands discharge instructions.   Pt instructed to follow up with PCP     Pt told to come back for new or worsening symptoms  No further questions         Candace Slade RN  01/23/22 0791

## 2022-01-24 NOTE — PROGRESS NOTES
Patient seen in ED, has no OBGYN provider on file. Call and check if needs follow up for Threatened Miscarriage In Early Pregnancy (Primary)  Vaginal bleeding   and to schedule with our office.

## 2022-01-26 ENCOUNTER — INITIAL PRENATAL (OUTPATIENT)
Dept: OBGYN CLINIC | Age: 22
End: 2022-01-26

## 2022-01-26 VITALS
HEART RATE: 66 BPM | SYSTOLIC BLOOD PRESSURE: 98 MMHG | DIASTOLIC BLOOD PRESSURE: 62 MMHG | BODY MASS INDEX: 25.46 KG/M2 | WEIGHT: 153 LBS

## 2022-01-26 DIAGNOSIS — Z32.01 PREGNANCY CONFIRMED BY POSITIVE URINE TEST: ICD-10-CM

## 2022-01-26 DIAGNOSIS — N91.1 SECONDARY AMENORRHEA: ICD-10-CM

## 2022-01-26 DIAGNOSIS — Z34.80 SUPERVISION OF OTHER NORMAL PREGNANCY, ANTEPARTUM: Primary | ICD-10-CM

## 2022-01-26 DIAGNOSIS — Z3A.10 10 WEEKS GESTATION OF PREGNANCY: ICD-10-CM

## 2022-01-26 LAB
ALBUMIN SERPL-MCNC: 4.4 G/DL (ref 3.5–4.6)
ALP BLD-CCNC: 87 U/L (ref 40–130)
ALT SERPL-CCNC: 24 U/L (ref 0–33)
ANION GAP SERPL CALCULATED.3IONS-SCNC: 15 MEQ/L (ref 9–15)
AST SERPL-CCNC: 23 U/L (ref 0–35)
BASOPHILS ABSOLUTE: 0 K/UL (ref 0–0.2)
BASOPHILS RELATIVE PERCENT: 0.2 %
BILIRUB SERPL-MCNC: 0.3 MG/DL (ref 0.2–0.7)
BUN BLDV-MCNC: 5 MG/DL (ref 6–20)
CALCIUM SERPL-MCNC: 9.4 MG/DL (ref 8.5–9.9)
CHLORIDE BLD-SCNC: 101 MEQ/L (ref 95–107)
CO2: 21 MEQ/L (ref 20–31)
CREAT SERPL-MCNC: 0.58 MG/DL (ref 0.5–0.9)
EOSINOPHILS ABSOLUTE: 0.1 K/UL (ref 0–0.7)
EOSINOPHILS RELATIVE PERCENT: 1.6 %
GFR AFRICAN AMERICAN: >60
GFR NON-AFRICAN AMERICAN: >60
GLOBULIN: 3.5 G/DL (ref 2.3–3.5)
GLUCOSE BLD-MCNC: 79 MG/DL (ref 70–99)
HCT VFR BLD CALC: 42 % (ref 37–47)
HEMOGLOBIN: 14.5 G/DL (ref 12–16)
HEPATITIS B SURFACE ANTIGEN INTERPRETATION: NORMAL
LYMPHOCYTES ABSOLUTE: 2.2 K/UL (ref 1–4.8)
LYMPHOCYTES RELATIVE PERCENT: 28.9 %
MCH RBC QN AUTO: 31.5 PG (ref 27–31.3)
MCHC RBC AUTO-ENTMCNC: 34.5 % (ref 33–37)
MCV RBC AUTO: 91.4 FL (ref 82–100)
MONOCYTES ABSOLUTE: 0.4 K/UL (ref 0.2–0.8)
MONOCYTES RELATIVE PERCENT: 5.1 %
NEUTROPHILS ABSOLUTE: 5 K/UL (ref 1.4–6.5)
NEUTROPHILS RELATIVE PERCENT: 64.2 %
PDW BLD-RTO: 13.6 % (ref 11.5–14.5)
PLATELET # BLD: 289 K/UL (ref 130–400)
POTASSIUM SERPL-SCNC: 4.8 MEQ/L (ref 3.4–4.9)
RBC # BLD: 4.6 M/UL (ref 4.2–5.4)
RUBELLA ANTIBODY IGG: 65.2 IU/ML
SODIUM BLD-SCNC: 137 MEQ/L (ref 135–144)
TOTAL PROTEIN: 7.9 G/DL (ref 6.3–8)
WBC # BLD: 7.8 K/UL (ref 4.8–10.8)

## 2022-01-26 PROCEDURE — G8484 FLU IMMUNIZE NO ADMIN: HCPCS | Performed by: OBSTETRICS & GYNECOLOGY

## 2022-01-26 PROCEDURE — 0500F INITIAL PRENATAL CARE VISIT: CPT | Performed by: OBSTETRICS & GYNECOLOGY

## 2022-01-26 PROCEDURE — 1036F TOBACCO NON-USER: CPT | Performed by: OBSTETRICS & GYNECOLOGY

## 2022-01-26 PROCEDURE — G8419 CALC BMI OUT NRM PARAM NOF/U: HCPCS | Performed by: OBSTETRICS & GYNECOLOGY

## 2022-01-26 PROCEDURE — G8427 DOCREV CUR MEDS BY ELIG CLIN: HCPCS | Performed by: OBSTETRICS & GYNECOLOGY

## 2022-01-27 LAB
ABO/RH: NORMAL
ANTIBODY SCREEN: NORMAL
HIV AG/AB: NONREACTIVE
RPR: NORMAL

## 2022-01-27 NOTE — PROGRESS NOTES
Initial OB visit      Viviana Gaspar is a 19y.o. year old female  @ 10.4 weeks presents for continuation of care. No complaints nausea has improved. POB: 1 sab            1     PGYN: n/a    PMH: n/a    PSH: none    MEDS: prenatal vit    Drug Allergies: pcn    SOCHX: neg times 3    FH: Mother or Father , DM No , HTN No, Other No    BP 98/62   Pulse 66   Wt 153 lb (69.4 kg)   LMP  (LMP Unknown)   BMI 25.46 kg/m²   Past Medical History:   Diagnosis Date    Herpes simplex virus (HSV) infection      No past surgical history on file. Review of Systems  Constitutional: negative for anorexia, chills and fatigue  Genitourinary:negative for genital lesions and vaginal discharge, dysuria and frequency, see above  Integument/breast: negative for dryness and pruritus  Behavioral/Psych: negative for abusive relationship, aggressive behavior and anxiety  Endocrine: negative     All other systems reviewed and are negative. Physical Exam:  Skin: Warm, dry, no lesions or rashes  Extremities: Without clubbing, cyanosis and edema. Palms and nails are normal. Ambulates without difficulty  Neurological: No gross sensory or motor deficits. Abdomen: Soft, non-tender without masses or organomegaly  bowel sounds normoactive  External Genitalia: Normal anatomy, no lesions or masses  Pubic Hair Distribution: Normal pattern and distribution  Pelvic Floor: Normal pelvic support, no significant cystocele or rectocele  Perineum: No fissures, lesions or leukoplakia  Urethra: Normal  Vagina: Moist, pink, supple, no lesions, no abnormal discharge  Cervix: Firm, nontender, no lesions  Uterus: firm, mobile, nontender, no masses or irregularities  Adnexa: Normal; No masses or tenderness bilaterally      Assessment:   1.  Supervision of other normal pregnancy, antepartum    2. 10 weeks gestation of pregnancy        Plan:   Orders Placed This Encounter   Procedures    Prenatal Testing for Fetal Aneuploidy     PANORAMA TEST     Standing Status:   Future     Standing Expiration Date:   2023    Cystic Fibrosis Carrier Study     Standing Status:   Future     Standing Expiration Date:   2023     Order Specific Question:   CF1-CF Symptom? (Y/N/Unknown)     Answer:   No     Order Specific Question:   CF3-Any Family History of CF? (Y/N)     Answer:   No     Order Specific Question:   CF Speciment? Answer:   Blood      No orders of the defined types were placed in this encounter. Plan:   Ravi Bal DO    [facility] [place]  2022  No LMP recorded (lmp unknown). Patient is pregnant. INITIAL OBSTETRICAL VISIT EVALUATION:  The patient was seen full history and physical was completed/reviewed. Cytology was collected for patients over 24years of age. Cultures were collected. The patient was counseled on office policies and she was counseled on termination of pregnancy in the state of [place]. The patient was counseled on Toxoplasmosis, HIV, Tobacco Abuse, Group Beta Strep Infections, Cystic Fibrosis,  Labor precautions and Sickle Cell disease. The patient was informed of a 2-4% risk of congenital anomalies in the general population. She was also informed that karyotyping is the only way to evaluate the fetus for genetic problems and genetic lethal anomalies. Chorionic villous sampling, amniocentesis and Maternal Genetic Blood Sampling-(NIPT Testing) were also discussed with morbidity rates in detail. She requested any of the options. Route of delivery and counseling on vaginal, operative vaginal, and  sections were completed with the risks of each to both the patient as well as her baby. The possibility of a blood transfusion was discussed as well. The patient was not opposed to receiving a transfusion if needed. First trimester screening and MSAFP single marker testing was reviewed in detail with attention to timing of testing and their windows. A second trimester amniocentesis with [facility] consults is also made available to the patient with significant risks. Risks, Benefits and non-invasive alternative testing was reviewed. The patient was questioned in detail regarding any genetic misnomer history, chromosomal abnormalities, or learning disabilities in  herself, the father of the baby or their families. SHE DENIED ANY HISTORY AS STATED ABOVE: Yes    Upon completion of the visit all questions were answered and the patients follow-up and testing schedule were reviewed. Prenatal vitamins were given if necessary. What to expect from visits and care discussed     Initial labs reviewed  Prenatal vitamins. Problem list reviewed and updated.   Role of ultrasound in pregnancy discussed  Follow-up in 4 weeks

## 2022-01-28 LAB
HEMOGLOBIN A-1 QUANTITATION: 96.8 % (ref 95–97.9)
HEMOGLOBIN A2 QUANTITATION: 2.9 % (ref 2–3.5)
HEMOGLOBIN C QUANTITATION: 0 % (ref 0–0)
HEMOGLOBIN E QUANTITATION: 0 % (ref 0–0)
HEMOGLOBIN ELECTROPHORESIS: NORMAL
HEMOGLOBIN EVALUATION: NORMAL
HEMOGLOBIN F QUANTITATION: 0.3 % (ref 0–2.1)
HEMOGLOBIN OTHER: 0 % (ref 0–0)
HEMOGLOBIN S QUANTITATION: 0 % (ref 0–0)
SICKLE CELL: NORMAL
VZV IGG SER QL IA: 386 IV

## 2022-02-10 DIAGNOSIS — Z3A.10 10 WEEKS GESTATION OF PREGNANCY: ICD-10-CM

## 2022-02-10 DIAGNOSIS — Z34.80 SUPERVISION OF OTHER NORMAL PREGNANCY, ANTEPARTUM: ICD-10-CM

## 2022-02-23 ENCOUNTER — ROUTINE PRENATAL (OUTPATIENT)
Dept: OBGYN CLINIC | Age: 22
End: 2022-02-23

## 2022-02-23 VITALS
HEART RATE: 73 BPM | SYSTOLIC BLOOD PRESSURE: 100 MMHG | DIASTOLIC BLOOD PRESSURE: 60 MMHG | BODY MASS INDEX: 25.95 KG/M2 | WEIGHT: 152 LBS | HEIGHT: 64 IN

## 2022-02-23 DIAGNOSIS — A74.9 CHLAMYDIA INFECTION: ICD-10-CM

## 2022-02-23 DIAGNOSIS — Z3A.14 14 WEEKS GESTATION OF PREGNANCY: Primary | ICD-10-CM

## 2022-02-23 DIAGNOSIS — Z34.80 SUPERVISION OF OTHER NORMAL PREGNANCY, ANTEPARTUM: ICD-10-CM

## 2022-02-23 PROCEDURE — G8428 CUR MEDS NOT DOCUMENT: HCPCS | Performed by: OBSTETRICS & GYNECOLOGY

## 2022-02-23 PROCEDURE — G8484 FLU IMMUNIZE NO ADMIN: HCPCS | Performed by: OBSTETRICS & GYNECOLOGY

## 2022-02-23 PROCEDURE — 0502F SUBSEQUENT PRENATAL CARE: CPT | Performed by: OBSTETRICS & GYNECOLOGY

## 2022-02-23 PROCEDURE — G8419 CALC BMI OUT NRM PARAM NOF/U: HCPCS | Performed by: OBSTETRICS & GYNECOLOGY

## 2022-02-23 PROCEDURE — 1036F TOBACCO NON-USER: CPT | Performed by: OBSTETRICS & GYNECOLOGY

## 2022-02-23 NOTE — PROGRESS NOTES
No LMP recorded (lmp unknown). Patient is pregnant. Please reference prenatal and OB flow chart for further information  PT here today for routine prenatal care  Pt endorses fetal movement and denies loss of fluid, contractions or vaginal bleeding  Pt without complaints  ROS:  Pt denies headache, dysuria, nausea/vomiting  PE:  /60   Pulse 73   Ht 5' 4\" (1.626 m)   Wt 152 lb (68.9 kg)   LMP  (LMP Unknown)   BMI 26.09 kg/m²   Gen - Alert and oriented x 3  HEENT- NC/AT, CVS - RRR, Lungs - CTAB  Abd - FH Appropriate fetal growth  LE no edema  Reassuring fetal status at this time     Diagnosis Orders   1. 14 weeks gestation of pregnancy  US OB 14 PLUS WEEKS SINGLE OR FIRST GESTATION    US OB TRANSVAGINAL   2. Chlamydia infection  C.trachomatis N.gonorrhoeae DNA    Wet Prep, Genital   3. Supervision of other normal pregnancy, antepartum  US OB 14 PLUS WEEKS SINGLE OR FIRST GESTATION    US OB TRANSVAGINAL       Upon completion of the visit all questions were answered and the patients follow-up and testing schedule were reviewed.

## 2022-03-01 LAB
C TRACH DNA GENITAL QL NAA+PROBE: NEGATIVE
N. GONORRHOEAE DNA: NEGATIVE

## 2022-03-23 ENCOUNTER — ROUTINE PRENATAL (OUTPATIENT)
Dept: OBGYN CLINIC | Age: 22
End: 2022-03-23

## 2022-03-23 VITALS
BODY MASS INDEX: 26.61 KG/M2 | HEART RATE: 74 BPM | WEIGHT: 155 LBS | DIASTOLIC BLOOD PRESSURE: 60 MMHG | SYSTOLIC BLOOD PRESSURE: 108 MMHG

## 2022-03-23 DIAGNOSIS — Z3A.18 18 WEEKS GESTATION OF PREGNANCY: ICD-10-CM

## 2022-03-23 DIAGNOSIS — Z34.80 SUPERVISION OF OTHER NORMAL PREGNANCY, ANTEPARTUM: ICD-10-CM

## 2022-03-23 DIAGNOSIS — Z34.80 SUPERVISION OF OTHER NORMAL PREGNANCY, ANTEPARTUM: Primary | ICD-10-CM

## 2022-03-23 PROCEDURE — G8484 FLU IMMUNIZE NO ADMIN: HCPCS | Performed by: OBSTETRICS & GYNECOLOGY

## 2022-03-23 PROCEDURE — 0502F SUBSEQUENT PRENATAL CARE: CPT | Performed by: OBSTETRICS & GYNECOLOGY

## 2022-03-23 PROCEDURE — 1036F TOBACCO NON-USER: CPT | Performed by: OBSTETRICS & GYNECOLOGY

## 2022-03-23 PROCEDURE — G8427 DOCREV CUR MEDS BY ELIG CLIN: HCPCS | Performed by: OBSTETRICS & GYNECOLOGY

## 2022-03-23 PROCEDURE — G8419 CALC BMI OUT NRM PARAM NOF/U: HCPCS | Performed by: OBSTETRICS & GYNECOLOGY

## 2022-03-23 NOTE — PROGRESS NOTES
No LMP recorded (lmp unknown). Patient is pregnant. Please reference prenatal and OB flow chart for further information  PT here today for routine prenatal care  Pt endorses fetal movement and denies loss of fluid, contractions or vaginal bleeding  Pt without complaints  ROS:  Pt denies headache, dysuria, nausea/vomiting  PE:  /60   Pulse 74   Wt 155 lb (70.3 kg)   LMP  (LMP Unknown)   BMI 26.61 kg/m²   Gen - Alert and oriented x 3  HEENT- NC/AT, CVS - RRR, Lungs - CTAB  Abd - FH Appropriate fetal growth  LE no edema  Reassuring fetal status at this time     Diagnosis Orders   1. Supervision of other normal pregnancy, antepartum  Alpha Fetoprotein Maternal Screen   2. 18 weeks gestation of pregnancy  Alpha Fetoprotein Maternal Screen       Upon completion of the visit all questions were answered and the patients follow-up and testing schedule were reviewed.

## 2022-03-25 LAB
AFP INTERPRETATION: NORMAL
AFP MOM: 1.24
AFP SPECIMEN: NORMAL
DATING: NORMAL
ESTIMATED DUE DATE: NORMAL
FETUS COUNT: NORMAL
GESTATIONAL AGE CALC AT COLLECT: NORMAL
HISTORY/NEURAL TUBE DEFECTS: NO
INSULIN DEP. DIABETIC: NO
MATERNAL AGE AT EDD: 23.9 YR
MATERNAL WEIGHT: NORMAL
PT AFP: 61 NG/ML
RACE: NORMAL
SMOKING: YES

## 2022-03-30 ENCOUNTER — HOSPITAL ENCOUNTER (OUTPATIENT)
Dept: ULTRASOUND IMAGING | Age: 22
Discharge: HOME | End: 2022-04-01
Payer: COMMERCIAL

## 2022-03-30 DIAGNOSIS — Z34.80 SUPERVISION OF OTHER NORMAL PREGNANCY, ANTEPARTUM: ICD-10-CM

## 2022-03-30 DIAGNOSIS — Z3A.14 14 WEEKS GESTATION OF PREGNANCY: ICD-10-CM

## 2022-03-30 PROCEDURE — 76805 OB US >/= 14 WKS SNGL FETUS: CPT

## 2022-04-26 ENCOUNTER — ROUTINE PRENATAL (OUTPATIENT)
Dept: OBGYN CLINIC | Age: 22
End: 2022-04-26

## 2022-04-26 VITALS
SYSTOLIC BLOOD PRESSURE: 110 MMHG | WEIGHT: 161 LBS | HEART RATE: 76 BPM | DIASTOLIC BLOOD PRESSURE: 70 MMHG | BODY MASS INDEX: 27.64 KG/M2

## 2022-04-26 DIAGNOSIS — N91.1 SECONDARY AMENORRHEA: ICD-10-CM

## 2022-04-26 DIAGNOSIS — Z32.01 PREGNANCY CONFIRMED BY POSITIVE URINE TEST: ICD-10-CM

## 2022-04-26 DIAGNOSIS — Z3A.23 23 WEEKS GESTATION OF PREGNANCY: Primary | ICD-10-CM

## 2022-04-26 DIAGNOSIS — Z34.80 SUPERVISION OF OTHER NORMAL PREGNANCY, ANTEPARTUM: ICD-10-CM

## 2022-04-26 LAB
BACTERIA: ABNORMAL /HPF
BILIRUBIN URINE: NEGATIVE
BLOOD, URINE: NEGATIVE
CLARITY: ABNORMAL
COLOR: YELLOW
EPITHELIAL CELLS, UA: ABNORMAL /HPF (ref 0–5)
GLUCOSE URINE: NEGATIVE MG/DL
HYALINE CASTS: ABNORMAL /HPF (ref 0–5)
KETONES, URINE: NEGATIVE MG/DL
LEUKOCYTE ESTERASE, URINE: ABNORMAL
NITRITE, URINE: POSITIVE
PH UA: 6.5 (ref 5–9)
PROTEIN UA: ABNORMAL MG/DL
RBC UA: ABNORMAL /HPF (ref 0–5)
SPECIFIC GRAVITY UA: 1.02 (ref 1–1.03)
UROBILINOGEN, URINE: 0.2 E.U./DL
WBC UA: ABNORMAL /HPF (ref 0–5)

## 2022-04-26 PROCEDURE — G8419 CALC BMI OUT NRM PARAM NOF/U: HCPCS | Performed by: OBSTETRICS & GYNECOLOGY

## 2022-04-26 PROCEDURE — 0502F SUBSEQUENT PRENATAL CARE: CPT | Performed by: OBSTETRICS & GYNECOLOGY

## 2022-04-26 PROCEDURE — G8427 DOCREV CUR MEDS BY ELIG CLIN: HCPCS | Performed by: OBSTETRICS & GYNECOLOGY

## 2022-04-26 PROCEDURE — 1036F TOBACCO NON-USER: CPT | Performed by: OBSTETRICS & GYNECOLOGY

## 2022-04-26 RX ORDER — SULFAMETHOXAZOLE AND TRIMETHOPRIM 800; 160 MG/1; MG/1
1 TABLET ORAL 2 TIMES DAILY
Qty: 14 TABLET | Refills: 0 | Status: SHIPPED | OUTPATIENT
Start: 2022-04-26 | End: 2022-05-03

## 2022-04-26 NOTE — PROGRESS NOTES
No LMP recorded (lmp unknown). Patient is pregnant. Please reference prenatal and OB flow chart for further information  PT here today for routine prenatal care  Pt endorses fetal movement and denies loss of fluid, contractions or vaginal bleeding  Pt without complaints  ROS:  Pt denies headache, dysuria, nausea/vomiting  PE:  /70   Pulse 76   Wt 161 lb (73 kg)   LMP  (LMP Unknown)   BMI 27.64 kg/m²   Gen - Alert and oriented x 3  HEENT- NC/AT, CVS - RRR, Lungs - CTAB  Abd - FH Appropriate fetal growth  LE no edema  Reassuring fetal status at this time     Diagnosis Orders   1. 23 weeks gestation of pregnancy         Upon completion of the visit all questions were answered and the patients follow-up and testing schedule were reviewed.

## 2022-04-27 LAB
ALCOHOL URINE: NEGATIVE MG/DL
AMPHETAMINE SCREEN, URINE: NEGATIVE NG/ML
BARBITURATE SCREEN URINE: NEGATIVE NG/ML
BENZODIAZEPINE SCREEN, URINE: NEGATIVE NG/ML
CANNABINOID SCREEN URINE: NEGATIVE NG/ML
COCAINE METABOLITE SCREEN URINE: NEGATIVE NG/ML
CREATININE URINE: 143.3 MG/DL (ref 20–400)
Lab: NORMAL
MDMA URINE: NEGATIVE NG/ML
METHADONE SCREEN, URINE: NEGATIVE NG/ML
OPIATE SCREEN URINE: NEGATIVE NG/ML
OXYCODONE SCREEN URINE: NEGATIVE NG/ML
PHENCYCLIDINE SCREEN URINE: NEGATIVE NG/ML
PROPOXYPHENE SCREEN: NEGATIVE NG/ML

## 2022-04-29 LAB
ORGANISM: ABNORMAL
URINE CULTURE, ROUTINE: ABNORMAL
URINE CULTURE, ROUTINE: ABNORMAL

## 2022-05-24 ENCOUNTER — ROUTINE PRENATAL (OUTPATIENT)
Dept: OBGYN CLINIC | Age: 22
End: 2022-05-24

## 2022-05-24 VITALS
HEART RATE: 78 BPM | WEIGHT: 163 LBS | BODY MASS INDEX: 27.98 KG/M2 | DIASTOLIC BLOOD PRESSURE: 66 MMHG | SYSTOLIC BLOOD PRESSURE: 98 MMHG

## 2022-05-24 DIAGNOSIS — Z3A.27 27 WEEKS GESTATION OF PREGNANCY: ICD-10-CM

## 2022-05-24 DIAGNOSIS — Z34.80 SUPERVISION OF OTHER NORMAL PREGNANCY, ANTEPARTUM: Primary | ICD-10-CM

## 2022-05-24 PROCEDURE — G8427 DOCREV CUR MEDS BY ELIG CLIN: HCPCS | Performed by: OBSTETRICS & GYNECOLOGY

## 2022-05-24 PROCEDURE — 1036F TOBACCO NON-USER: CPT | Performed by: OBSTETRICS & GYNECOLOGY

## 2022-05-24 PROCEDURE — 0502F SUBSEQUENT PRENATAL CARE: CPT | Performed by: OBSTETRICS & GYNECOLOGY

## 2022-05-24 PROCEDURE — G8419 CALC BMI OUT NRM PARAM NOF/U: HCPCS | Performed by: OBSTETRICS & GYNECOLOGY

## 2022-05-24 NOTE — PROGRESS NOTES
No LMP recorded (lmp unknown). Patient is pregnant. Please reference prenatal and OB flow chart for further information  PT here today for routine prenatal care  Pt endorses fetal movement and denies loss of fluid, contractions or vaginal bleeding  Pt without complaints  ROS:  Pt denies headache, dysuria, nausea/vomiting  PE:  BP 98/66   Pulse 78   Wt 163 lb (73.9 kg)   LMP  (LMP Unknown)   BMI 27.98 kg/m²   Gen - Alert and oriented x 3  HEENT- NC/AT, CVS - RRR, Lungs - CTAB  Abd - FH Appropriate fetal growth  LE no edema  Reassuring fetal status at this time     Diagnosis Orders   1. Supervision of other normal pregnancy, antepartum     2. 27 weeks gestation of pregnancy         Upon completion of the visit all questions were answered and the patients follow-up and testing schedule were reviewed.

## 2022-05-25 DIAGNOSIS — Z34.80 SUPERVISION OF OTHER NORMAL PREGNANCY, ANTEPARTUM: ICD-10-CM

## 2022-05-25 LAB
GLUCOSE, 1HR PP: 101 MG/DL (ref 60–140)
HCT VFR BLD CALC: 34.8 % (ref 37–47)
HEMOGLOBIN: 11.6 G/DL (ref 12–16)

## 2022-05-26 LAB — RPR: NORMAL

## 2022-06-23 ENCOUNTER — ROUTINE PRENATAL (OUTPATIENT)
Dept: OBGYN CLINIC | Age: 22
End: 2022-06-23

## 2022-06-23 VITALS
WEIGHT: 172 LBS | SYSTOLIC BLOOD PRESSURE: 102 MMHG | DIASTOLIC BLOOD PRESSURE: 70 MMHG | BODY MASS INDEX: 29.52 KG/M2 | HEART RATE: 76 BPM

## 2022-06-23 DIAGNOSIS — Z34.83 ENCOUNTER FOR SUPERVISION OF OTHER NORMAL PREGNANCY IN THIRD TRIMESTER: ICD-10-CM

## 2022-06-23 DIAGNOSIS — Z3A.31 31 WEEKS GESTATION OF PREGNANCY: Primary | ICD-10-CM

## 2022-06-23 PROCEDURE — G8419 CALC BMI OUT NRM PARAM NOF/U: HCPCS | Performed by: OBSTETRICS & GYNECOLOGY

## 2022-06-23 PROCEDURE — 0502F SUBSEQUENT PRENATAL CARE: CPT | Performed by: OBSTETRICS & GYNECOLOGY

## 2022-06-23 PROCEDURE — 1036F TOBACCO NON-USER: CPT | Performed by: OBSTETRICS & GYNECOLOGY

## 2022-06-23 PROCEDURE — G8427 DOCREV CUR MEDS BY ELIG CLIN: HCPCS | Performed by: OBSTETRICS & GYNECOLOGY

## 2022-06-23 NOTE — PROGRESS NOTES
No LMP recorded (lmp unknown). Patient is pregnant. Please reference prenatal and OB flow chart for further information  PT here today for routine prenatal care  Pt endorses fetal movement and denies loss of fluid, contractions or vaginal bleeding  Pt without complaints  ROS:  Pt denies headache, dysuria, nausea/vomiting  PE:  /70   Pulse 76   Wt 172 lb (78 kg)   LMP  (LMP Unknown)   BMI 29.52 kg/m²   Gen - Alert and oriented x 3  HEENT- NC/AT, CVS - RRR, Lungs - CTAB  Abd - FH Appropriate fetal growth  LE no edema  Reassuring fetal status at this time     Diagnosis Orders   1. 31 weeks gestation of pregnancy     2. Encounter for supervision of other normal pregnancy in third trimester         Upon completion of the visit all questions were answered and the patients follow-up and testing schedule were reviewed.

## 2022-07-01 ENCOUNTER — HOSPITAL ENCOUNTER (EMERGENCY)
Age: 22
Discharge: HOME | End: 2022-07-01
Attending: EMERGENCY MEDICINE
Payer: COMMERCIAL

## 2022-07-01 VITALS
DIASTOLIC BLOOD PRESSURE: 64 MMHG | BODY MASS INDEX: 28.49 KG/M2 | HEIGHT: 65 IN | HEART RATE: 108 BPM | SYSTOLIC BLOOD PRESSURE: 105 MMHG | TEMPERATURE: 98.4 F | WEIGHT: 171 LBS | RESPIRATION RATE: 20 BRPM | OXYGEN SATURATION: 100 %

## 2022-07-01 DIAGNOSIS — U07.1 COVID-19: Primary | ICD-10-CM

## 2022-07-01 LAB
BILIRUBIN URINE: NEGATIVE
BLOOD, URINE: NEGATIVE
CLARITY: CLEAR
COLOR: ABNORMAL
GLUCOSE URINE: NEGATIVE MG/DL
KETONES, URINE: >=80 MG/DL
LEUKOCYTE ESTERASE, URINE: NEGATIVE
NITRITE, URINE: NEGATIVE
PH UA: 6 (ref 5–9)
PROTEIN UA: ABNORMAL MG/DL
SARS-COV-2, NAAT: DETECTED
SPECIFIC GRAVITY UA: 1.01 (ref 1–1.03)
URINE REFLEX TO CULTURE: ABNORMAL
UROBILINOGEN, URINE: 0.2 E.U./DL

## 2022-07-01 PROCEDURE — 81003 URINALYSIS AUTO W/O SCOPE: CPT

## 2022-07-01 PROCEDURE — 2580000003 HC RX 258: Performed by: EMERGENCY MEDICINE

## 2022-07-01 PROCEDURE — 6370000000 HC RX 637 (ALT 250 FOR IP): Performed by: EMERGENCY MEDICINE

## 2022-07-01 PROCEDURE — 96374 THER/PROPH/DIAG INJ IV PUSH: CPT

## 2022-07-01 PROCEDURE — 87635 SARS-COV-2 COVID-19 AMP PRB: CPT

## 2022-07-01 PROCEDURE — 6360000002 HC RX W HCPCS: Performed by: EMERGENCY MEDICINE

## 2022-07-01 PROCEDURE — 99284 EMERGENCY DEPT VISIT MOD MDM: CPT

## 2022-07-01 RX ORDER — ONDANSETRON 4 MG/1
4 TABLET, ORALLY DISINTEGRATING ORAL 3 TIMES DAILY PRN
Qty: 12 TABLET | Refills: 0 | Status: ON HOLD | OUTPATIENT
Start: 2022-07-01 | End: 2022-08-16 | Stop reason: HOSPADM

## 2022-07-01 RX ORDER — ACETAMINOPHEN 325 MG/1
650 TABLET ORAL ONCE
Status: COMPLETED | OUTPATIENT
Start: 2022-07-01 | End: 2022-07-01

## 2022-07-01 RX ORDER — 0.9 % SODIUM CHLORIDE 0.9 %
1000 INTRAVENOUS SOLUTION INTRAVENOUS ONCE
Status: COMPLETED | OUTPATIENT
Start: 2022-07-01 | End: 2022-07-01

## 2022-07-01 RX ORDER — ONDANSETRON 2 MG/ML
4 INJECTION INTRAMUSCULAR; INTRAVENOUS ONCE
Status: COMPLETED | OUTPATIENT
Start: 2022-07-01 | End: 2022-07-01

## 2022-07-01 RX ADMIN — ONDANSETRON 4 MG: 2 INJECTION INTRAMUSCULAR; INTRAVENOUS at 12:58

## 2022-07-01 RX ADMIN — SODIUM CHLORIDE 1000 ML: 9 INJECTION, SOLUTION INTRAVENOUS at 12:57

## 2022-07-01 RX ADMIN — ACETAMINOPHEN 650 MG: 325 TABLET ORAL at 12:59

## 2022-07-01 ASSESSMENT — PAIN - FUNCTIONAL ASSESSMENT: PAIN_FUNCTIONAL_ASSESSMENT: NONE - DENIES PAIN

## 2022-07-01 ASSESSMENT — ENCOUNTER SYMPTOMS
NAUSEA: 1
VOMITING: 1

## 2022-07-01 NOTE — ED PROVIDER NOTES
[address] [facility] ED  EMERGENCY DEPARTMENT ENCOUNTER      Pt Name: Arthur James  MRN: [id number]  Souravgfwiliam 2000  Date of evaluation: 7/1/2022  Provider: Joel Lackey MD    CHIEF COMPLAINT       Chief Complaint   Patient presents with    Emesis     pt c/o n&v since last night, body aches         HISTORY OF PRESENT ILLNESS   (Location/Symptom, Timing/Onset, Context/Setting, Quality, Duration, Modifying Factors, Severity)  Note limiting factors. 58-year-old female presenting with nausea and vomiting and body aches. Symptoms have been ongoing since last night around 8 PM.  Denies any cough or fever. No known sick contacts. Has not taken anything for relief. She is 32 weeks pregnant. Denies any abdominal pain, vaginal discharge, bleeding or loss of fluid. Good fetal movement. Nursing Notes were reviewed. REVIEW OF SYSTEMS    (2-9 systems for level 4, 10 or more for level 5)     Review of Systems   Gastrointestinal: Positive for nausea and vomiting. Musculoskeletal: Positive for myalgias. All other systems reviewed and are negative. Except as noted above the remainder of the review of systems was reviewed and negative. PAST MEDICAL HISTORY     Past Medical History:   Diagnosis Date    Herpes simplex virus (HSV) infection          SURGICAL HISTORY     History reviewed. No pertinent surgical history.       CURRENT MEDICATIONS       Current Discharge Medication List      CONTINUE these medications which have NOT CHANGED    Details   ondansetron (ZOFRAN-ODT) 4 MG disintegrating tablet Take 1 tablet by mouth 3 times daily as needed for Nausea or Vomiting  Qty: 21 tablet, Refills: 3    Associated Diagnoses: Secondary amenorrhea; Pregnancy confirmed by positive urine test      Jhmqlz-GvDcv-PnZmn-Meth-FA-DHA (PRENATE MINI) 18-0.6-0.4-350 MG CAPS Take 1 tablet by mouth daily  Qty: 30 capsule, Refills: 5    Associated Diagnoses: Secondary amenorrhea; Pregnancy confirmed by positive urine test      valACYclovir (VALTREX) 500 MG tablet Take 1 tablet by mouth daily  Qty: 30 tablet, Refills: 3             ALLERGIES     Pcn [penicillins]    FAMILY HISTORY       Family History   Problem Relation Age of Onset    Cancer Sister         leukemia    Heart Disease Maternal Uncle           SOCIAL HISTORY       Social History     Socioeconomic History    Marital status: Single     Spouse name: None    Number of children: None    Years of education: None    Highest education level: None   Occupational History    None   Tobacco Use    Smoking status: Former Smoker     Packs/day: 0.25     Years: 1.00     Pack years: 0.25     Types: Cigarettes    Smokeless tobacco: Never Used    Tobacco comment: every couple days I may have a cig   Vaping Use    Vaping Use: Never used   Substance and Sexual Activity    Alcohol use: Not Currently     Alcohol/week: 0.0 standard drinks     Comment: soacially before pregancy    Drug use: No    Sexual activity: Yes     Partners: Male     Birth control/protection: Implant     Comment: Nexplanon   Other Topics Concern    None   Social History Narrative    None     Social Determinants of Health     Financial Resource Strain:     Difficulty of Paying Living Expenses: Not on file   Food Insecurity:     Worried About Running Out of Food in the Last Year: Not on file    Amaya of Food in the Last Year: Not on file   Transportation Needs:     Lack of Transportation (Medical): Not on file    Lack of Transportation (Non-Medical):  Not on file   Physical Activity:     Days of Exercise per Week: Not on file    Minutes of Exercise per Session: Not on file   Stress:     Feeling of Stress : Not on file   Social Connections:     Frequency of Communication with Friends and Family: Not on file    Frequency of Social Gatherings with Friends and Family: Not on file    Attends Taoism Services: Not on file    Active Member of Clubs or Organizations: Not on file    Attends dry.      Capillary Refill: Capillary refill takes less than 2 seconds. Neurological:      General: No focal deficit present. Mental Status: She is alert and oriented to person, place, and time. Mental status is at baseline. Cranial Nerves: No cranial nerve deficit. Psychiatric:         Thought Content: Thought content normal.         DIAGNOSTIC RESULTS     EKG: All EKG's are interpreted by the Emergency Department Physician who either signs or Co-signs this chart in the absence of a cardiologist.    RADIOLOGY:   Non-plain film images such as CT, Ultrasound and MRI are read by the radiologist. Plain radiographic images are visualized and preliminarily interpreted by the emergency physician with the below findings:    Interpretation per the Radiologist below, if available at the time of this note:    No orders to display       LABS:  Labs Reviewed   COVID-19, RAPID - Abnormal; Notable for the following components:       Result Value    SARS-CoV-2, NAAT DETECTED (*)     All other components within normal limits    Narrative:     Phil Chun tel. [phone],  ., 07/01/2022 12:35, by [facility] TO CULTURE - Abnormal; Notable for the following components:    Color, UA ORANGE (*)     Ketones, Urine >=80 (*)     Protein, UA TRACE (*)     All other components within normal limits       All other labs were within normal range or not returned as of this dictation. EMERGENCY DEPARTMENT COURSE and DIFFERENTIAL DIAGNOSIS/MDM:   Vitals:    Vitals:    07/01/22 1216 07/01/22 1221 07/01/22 1310   BP:  87/60 105/64   Pulse: (!) 113  (!) 108   Resp: 18  20   Temp: 98.4 °F (36.9 °C)     TempSrc: Oral     SpO2: 99%  100%   Weight: 171 lb (77.6 kg)     Height: 5' 5\" (1.651 m)         MDM  Number of Diagnoses or Management Options  COVID-19  Diagnosis management comments: Tolerating fluids prior to discharge. Feels well on reevaluation. Symptoms likely secondary to COVID.   Recommended 5-day quarantine. Patient will be discharged home in good condition. Patient has been hemodynamically stable throughout ED course and is appropriate for outpatient follow up. Patient should return to ED immediately for any new or worsening symptoms. Patient is well appearing on discharge and agreeable with plan of care. Procedures    CRITICAL CARE TIME   Total Critical Care time was 0 minutes, excluding separately reportable procedures. There was a high probability of clinically significant/life threatening deterioration in the patient's condition which required my urgent intervention.        FINAL IMPRESSION      1. COVID-19          DISPOSITION/PLAN   DISPOSITION Decision To Discharge 07/01/2022 01:30:39 PM      (Please note that portions of this note were completed with a voice recognition program.  Efforts were made to edit the dictations but occasionally words are mis-transcribed.)    Litzy Mcdermott MD (electronically signed)  Attending Emergency Physician        Litzy Mcdermott MD  07/01/22 8020

## 2022-07-01 NOTE — ED TRIAGE NOTES
Pt 32 weeks pregnant, c/o n&v since last night, pt states she can;t keep anything down, co gen. Body aches at time, denies abd pain, pt states she feels baby moving. Pt a&ox4, skin w/d/pink, 0 sob.

## 2022-07-08 ENCOUNTER — ROUTINE PRENATAL (OUTPATIENT)
Dept: OBGYN CLINIC | Age: 22
End: 2022-07-08

## 2022-07-08 VITALS
SYSTOLIC BLOOD PRESSURE: 110 MMHG | WEIGHT: 174 LBS | DIASTOLIC BLOOD PRESSURE: 72 MMHG | BODY MASS INDEX: 28.96 KG/M2 | HEART RATE: 82 BPM

## 2022-07-08 DIAGNOSIS — Z3A.33 33 WEEKS GESTATION OF PREGNANCY: Primary | ICD-10-CM

## 2022-07-08 DIAGNOSIS — Z34.80 SUPERVISION OF OTHER NORMAL PREGNANCY, ANTEPARTUM: ICD-10-CM

## 2022-07-08 PROCEDURE — 0502F SUBSEQUENT PRENATAL CARE: CPT | Performed by: OBSTETRICS & GYNECOLOGY

## 2022-07-08 PROCEDURE — G8419 CALC BMI OUT NRM PARAM NOF/U: HCPCS | Performed by: OBSTETRICS & GYNECOLOGY

## 2022-07-08 PROCEDURE — G8427 DOCREV CUR MEDS BY ELIG CLIN: HCPCS | Performed by: OBSTETRICS & GYNECOLOGY

## 2022-07-08 PROCEDURE — 1036F TOBACCO NON-USER: CPT | Performed by: OBSTETRICS & GYNECOLOGY

## 2022-07-08 NOTE — PROGRESS NOTES
No LMP recorded (lmp unknown). Patient is pregnant. Please reference prenatal and OB flow chart for further information  PT here today for routine prenatal care  Pt endorses fetal movement and denies loss of fluid, contractions or vaginal bleeding  Pt without complaints  ROS:  Pt denies headache, dysuria, nausea/vomiting  PE:  /72   Pulse 82   Wt 174 lb (78.9 kg)   LMP  (LMP Unknown)   BMI 28.96 kg/m²   Gen - Alert and oriented x 3  HEENT- NC/AT, CVS - RRR, Lungs - CTAB  Abd - FH Appropriate fetal growth  LE no edema  Reassuring fetal status at this time     Diagnosis Orders   1. 33 weeks gestation of pregnancy         Upon completion of the visit all questions were answered and the patients follow-up and testing schedule were reviewed.

## 2022-07-25 ENCOUNTER — HOSPITAL ENCOUNTER (OUTPATIENT)
Dept: ULTRASOUND IMAGING | Age: 22
Discharge: HOME | End: 2022-07-27
Payer: COMMERCIAL

## 2022-07-25 DIAGNOSIS — Z34.83 ENCOUNTER FOR SUPERVISION OF OTHER NORMAL PREGNANCY IN THIRD TRIMESTER: ICD-10-CM

## 2022-07-25 PROCEDURE — 76815 OB US LIMITED FETUS(S): CPT

## 2022-07-26 ENCOUNTER — ROUTINE PRENATAL (OUTPATIENT)
Dept: OBGYN CLINIC | Age: 22
End: 2022-07-26
Payer: COMMERCIAL

## 2022-07-26 VITALS
SYSTOLIC BLOOD PRESSURE: 108 MMHG | WEIGHT: 178 LBS | DIASTOLIC BLOOD PRESSURE: 70 MMHG | BODY MASS INDEX: 29.62 KG/M2 | HEART RATE: 86 BPM

## 2022-07-26 DIAGNOSIS — Z34.80 SUPERVISION OF OTHER NORMAL PREGNANCY, ANTEPARTUM: ICD-10-CM

## 2022-07-26 DIAGNOSIS — Z3A.36 36 WEEKS GESTATION OF PREGNANCY: ICD-10-CM

## 2022-07-26 DIAGNOSIS — Z3A.36 36 WEEKS GESTATION OF PREGNANCY: Primary | ICD-10-CM

## 2022-07-26 PROCEDURE — 0502F SUBSEQUENT PRENATAL CARE: CPT | Performed by: OBSTETRICS & GYNECOLOGY

## 2022-07-26 NOTE — PROGRESS NOTES
No LMP recorded (lmp unknown). Patient is pregnant. Please reference prenatal and OB flow chart for further information  PT here today for routine prenatal care  Pt endorses fetal movement and denies loss of fluid, contractions or vaginal bleeding  Pt without complaints  ROS:  Pt denies headache, dysuria, nausea/vomiting  PE:  /70   Pulse 86   Wt 178 lb (80.7 kg)   LMP  (LMP Unknown)   BMI 29.62 kg/m²   Gen - Alert and oriented x 3  HEENT- NC/AT, CVS - RRR, Lungs - CTAB  Abd - FH Appropriate fetal growth  LE no edema  Reassuring fetal status at this time     Diagnosis Orders   1. 36 weeks gestation of pregnancy  Culture, Strep B Screen, Vaginal/Rectal      2. Supervision of other normal pregnancy, antepartum  Culture, Strep B Screen, Vaginal/Rectal          Upon completion of the visit all questions were answered and the patients follow-up and testing schedule were reviewed.

## 2022-07-29 ENCOUNTER — TELEPHONE (OUTPATIENT)
Dept: OBGYN CLINIC | Age: 22
End: 2022-07-29

## 2022-07-29 NOTE — TELEPHONE ENCOUNTER
Pt calling to inform doctor that she has lost a good portion of her mucous plug. Pt was made aware to monitor symptoms. If she has any cramping that is consistent, vaginal pressure, back pain, decreased fetal movement, leaking of fluid, she needs to go to L&D.

## 2022-07-30 LAB — GROUP B STREP CULTURE: NORMAL

## 2022-08-02 ENCOUNTER — ROUTINE PRENATAL (OUTPATIENT)
Dept: OBGYN CLINIC | Age: 22
End: 2022-08-02

## 2022-08-02 VITALS
WEIGHT: 178 LBS | SYSTOLIC BLOOD PRESSURE: 104 MMHG | DIASTOLIC BLOOD PRESSURE: 74 MMHG | BODY MASS INDEX: 29.62 KG/M2 | HEART RATE: 82 BPM

## 2022-08-02 DIAGNOSIS — Z3A.37 37 WEEKS GESTATION OF PREGNANCY: Primary | ICD-10-CM

## 2022-08-02 DIAGNOSIS — Z34.80 SUPERVISION OF OTHER NORMAL PREGNANCY, ANTEPARTUM: ICD-10-CM

## 2022-08-02 PROCEDURE — 0502F SUBSEQUENT PRENATAL CARE: CPT | Performed by: OBSTETRICS & GYNECOLOGY

## 2022-08-02 NOTE — PROGRESS NOTES
No LMP recorded (lmp unknown). Patient is pregnant. Please reference prenatal and OB flow chart for further information  PT here today for routine prenatal care  Pt endorses fetal movement and denies loss of fluid, contractions or vaginal bleeding  Pt without complaints  ROS:  Pt denies headache, dysuria, nausea/vomiting  PE:  /74   Pulse 82   Wt 178 lb (80.7 kg)   LMP  (LMP Unknown)   BMI 29.62 kg/m²   Gen - Alert and oriented x 3  HEENT- NC/AT, CVS - RRR, Lungs - CTAB  Abd - FH Appropriate fetal growth  LE no edema  Reassuring fetal status at this time     Diagnosis Orders   1. 37 weeks gestation of pregnancy            Upon completion of the visit all questions were answered and the patients follow-up and testing schedule were reviewed.

## 2022-08-09 ENCOUNTER — ROUTINE PRENATAL (OUTPATIENT)
Dept: OBGYN CLINIC | Age: 22
End: 2022-08-09

## 2022-08-09 VITALS
BODY MASS INDEX: 29.62 KG/M2 | SYSTOLIC BLOOD PRESSURE: 102 MMHG | HEART RATE: 100 BPM | DIASTOLIC BLOOD PRESSURE: 64 MMHG | WEIGHT: 178 LBS

## 2022-08-09 DIAGNOSIS — Z34.80 SUPERVISION OF OTHER NORMAL PREGNANCY, ANTEPARTUM: Primary | ICD-10-CM

## 2022-08-09 DIAGNOSIS — Z3A.38 38 WEEKS GESTATION OF PREGNANCY: ICD-10-CM

## 2022-08-09 PROCEDURE — G8427 DOCREV CUR MEDS BY ELIG CLIN: HCPCS | Performed by: OBSTETRICS & GYNECOLOGY

## 2022-08-09 PROCEDURE — G8419 CALC BMI OUT NRM PARAM NOF/U: HCPCS | Performed by: OBSTETRICS & GYNECOLOGY

## 2022-08-09 PROCEDURE — 1036F TOBACCO NON-USER: CPT | Performed by: OBSTETRICS & GYNECOLOGY

## 2022-08-09 PROCEDURE — 0502F SUBSEQUENT PRENATAL CARE: CPT | Performed by: OBSTETRICS & GYNECOLOGY

## 2022-08-09 NOTE — PROGRESS NOTES
No LMP recorded (lmp unknown). Patient is pregnant. Please reference prenatal and OB flow chart for further information  PT here today for routine prenatal care  Pt endorses fetal movement and denies loss of fluid, contractions or vaginal bleeding  Pt without complaints  ROS:  Pt denies headache, dysuria, nausea/vomiting  PE:  /64   Pulse 100   Wt 178 lb (80.7 kg)   LMP  (LMP Unknown)   BMI 29.62 kg/m²   Gen - Alert and oriented x 3  HEENT- NC/AT, CVS - RRR, Lungs - CTAB  Abd - FH Appropriate fetal growth  LE no edema  Reassuring fetal status at this time     Diagnosis Orders   1. Supervision of other normal pregnancy, antepartum        2. 38 weeks gestation of pregnancy            Upon completion of the visit all questions were answered and the patients follow-up and testing schedule were reviewed.

## 2022-08-14 ENCOUNTER — ANESTHESIA (OUTPATIENT)
Dept: LABOR AND DELIVERY | Age: 22
End: 2022-08-14
Payer: COMMERCIAL

## 2022-08-14 ENCOUNTER — HOSPITAL ENCOUNTER (INPATIENT)
Age: 22
LOS: 1 days | Discharge: HOME | End: 2022-08-16
Attending: OBSTETRICS & GYNECOLOGY | Admitting: OBSTETRICS & GYNECOLOGY
Payer: COMMERCIAL

## 2022-08-14 ENCOUNTER — APPOINTMENT (OUTPATIENT)
Dept: LABOR AND DELIVERY | Age: 22
End: 2022-08-14
Payer: COMMERCIAL

## 2022-08-14 ENCOUNTER — ANESTHESIA EVENT (OUTPATIENT)
Dept: LABOR AND DELIVERY | Age: 22
End: 2022-08-14
Payer: COMMERCIAL

## 2022-08-14 LAB
ALBUMIN SERPL-MCNC: 3.7 G/DL (ref 3.5–4.6)
ALP BLD-CCNC: 179 U/L (ref 40–130)
ALT SERPL-CCNC: 6 U/L (ref 0–33)
AMPHETAMINE SCREEN, URINE: NORMAL
ANION GAP SERPL CALCULATED.3IONS-SCNC: 17 MEQ/L (ref 9–15)
AST SERPL-CCNC: 13 U/L (ref 0–35)
BACTERIA: ABNORMAL /HPF
BARBITURATE SCREEN URINE: NORMAL
BASOPHILS ABSOLUTE: 0 K/UL (ref 0–0.2)
BASOPHILS RELATIVE PERCENT: 0.3 %
BENZODIAZEPINE SCREEN, URINE: NORMAL
BILIRUB SERPL-MCNC: 0.3 MG/DL (ref 0.2–0.7)
BILIRUBIN URINE: NEGATIVE
BLOOD, URINE: NEGATIVE
BUN BLDV-MCNC: 8 MG/DL (ref 6–20)
CALCIUM SERPL-MCNC: 8.7 MG/DL (ref 8.5–9.9)
CANNABINOID SCREEN URINE: NORMAL
CHLORIDE BLD-SCNC: 105 MEQ/L (ref 95–107)
CLARITY: ABNORMAL
CO2: 16 MEQ/L (ref 20–31)
COCAINE METABOLITE SCREEN URINE: NORMAL
COLOR: YELLOW
CREAT SERPL-MCNC: 0.56 MG/DL (ref 0.5–0.9)
EOSINOPHILS ABSOLUTE: 0.2 K/UL (ref 0–0.7)
EOSINOPHILS RELATIVE PERCENT: 1.4 %
EPITHELIAL CELLS, UA: ABNORMAL /HPF (ref 0–5)
GFR AFRICAN AMERICAN: >60
GFR NON-AFRICAN AMERICAN: >60
GLOBULIN: 2.8 G/DL (ref 2.3–3.5)
GLUCOSE BLD-MCNC: 67 MG/DL (ref 70–99)
GLUCOSE URINE: NEGATIVE MG/DL
HCT VFR BLD CALC: 30.2 % (ref 37–47)
HEMOGLOBIN: 9.8 G/DL (ref 12–16)
HYALINE CASTS: ABNORMAL /HPF (ref 0–5)
KETONES, URINE: NEGATIVE MG/DL
LEUKOCYTE ESTERASE, URINE: ABNORMAL
LYMPHOCYTES ABSOLUTE: 2.8 K/UL (ref 1–4.8)
LYMPHOCYTES RELATIVE PERCENT: 24.5 %
Lab: NORMAL
MCH RBC QN AUTO: 28.2 PG (ref 27–31.3)
MCHC RBC AUTO-ENTMCNC: 32.6 % (ref 33–37)
MCV RBC AUTO: 86.4 FL (ref 82–100)
METHADONE SCREEN, URINE: NORMAL
MONOCYTES ABSOLUTE: 0.6 K/UL (ref 0.2–0.8)
MONOCYTES RELATIVE PERCENT: 5.4 %
NEUTROPHILS ABSOLUTE: 7.7 K/UL (ref 1.4–6.5)
NEUTROPHILS RELATIVE PERCENT: 68.4 %
NITRITE, URINE: NEGATIVE
OPIATE SCREEN URINE: NORMAL
PDW BLD-RTO: 15.1 % (ref 11.5–14.5)
PH UA: 7 (ref 5–9)
PHENCYCLIDINE SCREEN URINE: NORMAL
PLATELET # BLD: 262 K/UL (ref 130–400)
POTASSIUM SERPL-SCNC: 3.9 MEQ/L (ref 3.4–4.9)
PROPOXYPHENE SCREEN, URINE: NORMAL
PROTEIN UA: NEGATIVE MG/DL
RBC # BLD: 3.5 M/UL (ref 4.2–5.4)
RBC UA: ABNORMAL /HPF (ref 0–5)
SARS-COV-2, NAAT: NOT DETECTED
SODIUM BLD-SCNC: 138 MEQ/L (ref 135–144)
SPECIFIC GRAVITY UA: 1.01 (ref 1–1.03)
TOTAL PROTEIN: 6.5 G/DL (ref 6.3–8)
UR OXYCODONE RAPID SCREEN: NORMAL
UROBILINOGEN, URINE: 0.2 E.U./DL
WBC # BLD: 11.3 K/UL (ref 4.8–10.8)
WBC UA: ABNORMAL /HPF (ref 0–5)

## 2022-08-14 PROCEDURE — 87635 SARS-COV-2 COVID-19 AMP PRB: CPT

## 2022-08-14 PROCEDURE — 3E033VJ INTRODUCTION OF OTHER HORMONE INTO PERIPHERAL VEIN, PERCUTANEOUS APPROACH: ICD-10-PCS | Performed by: OBSTETRICS & GYNECOLOGY

## 2022-08-14 PROCEDURE — 86592 SYPHILIS TEST NON-TREP QUAL: CPT

## 2022-08-14 PROCEDURE — 6360000002 HC RX W HCPCS: Performed by: OBSTETRICS & GYNECOLOGY

## 2022-08-14 PROCEDURE — 80306 DRUG TEST PRSMV INSTRMNT: CPT

## 2022-08-14 PROCEDURE — 10907ZC DRAINAGE OF AMNIOTIC FLUID, THERAPEUTIC FROM PRODUCTS OF CONCEPTION, VIA NATURAL OR ARTIFICIAL OPENING: ICD-10-PCS | Performed by: OBSTETRICS & GYNECOLOGY

## 2022-08-14 PROCEDURE — 86900 BLOOD TYPING SEROLOGIC ABO: CPT

## 2022-08-14 PROCEDURE — 85025 COMPLETE CBC W/AUTO DIFF WBC: CPT

## 2022-08-14 PROCEDURE — 86850 RBC ANTIBODY SCREEN: CPT

## 2022-08-14 PROCEDURE — 86901 BLOOD TYPING SEROLOGIC RH(D): CPT

## 2022-08-14 PROCEDURE — 80053 COMPREHEN METABOLIC PANEL: CPT

## 2022-08-14 PROCEDURE — 87389 HIV-1 AG W/HIV-1&-2 AB AG IA: CPT

## 2022-08-14 PROCEDURE — 81001 URINALYSIS AUTO W/SCOPE: CPT

## 2022-08-14 PROCEDURE — 2580000003 HC RX 258: Performed by: OBSTETRICS & GYNECOLOGY

## 2022-08-14 PROCEDURE — 87340 HEPATITIS B SURFACE AG IA: CPT

## 2022-08-14 RX ORDER — SODIUM CHLORIDE, SODIUM LACTATE, POTASSIUM CHLORIDE, AND CALCIUM CHLORIDE .6; .31; .03; .02 G/100ML; G/100ML; G/100ML; G/100ML
500 INJECTION, SOLUTION INTRAVENOUS PRN
Status: DISCONTINUED | OUTPATIENT
Start: 2022-08-14 | End: 2022-08-15

## 2022-08-14 RX ORDER — DOCUSATE SODIUM 100 MG/1
100 CAPSULE, LIQUID FILLED ORAL 2 TIMES DAILY PRN
Status: DISCONTINUED | OUTPATIENT
Start: 2022-08-14 | End: 2022-08-15

## 2022-08-14 RX ORDER — ACETAMINOPHEN 325 MG/1
650 TABLET ORAL EVERY 4 HOURS PRN
Status: DISCONTINUED | OUTPATIENT
Start: 2022-08-14 | End: 2022-08-15

## 2022-08-14 RX ORDER — NALBUPHINE HCL 10 MG/ML
5 AMPUL (ML) INJECTION
Status: DISCONTINUED | OUTPATIENT
Start: 2022-08-14 | End: 2022-08-15

## 2022-08-14 RX ORDER — SODIUM CHLORIDE 0.9 % (FLUSH) 0.9 %
5-40 SYRINGE (ML) INJECTION PRN
Status: DISCONTINUED | OUTPATIENT
Start: 2022-08-14 | End: 2022-08-15

## 2022-08-14 RX ORDER — ONDANSETRON 2 MG/ML
4 INJECTION INTRAMUSCULAR; INTRAVENOUS EVERY 6 HOURS PRN
Status: DISCONTINUED | OUTPATIENT
Start: 2022-08-14 | End: 2022-08-15

## 2022-08-14 RX ORDER — SODIUM CHLORIDE 0.9 % (FLUSH) 0.9 %
5-40 SYRINGE (ML) INJECTION EVERY 12 HOURS SCHEDULED
Status: DISCONTINUED | OUTPATIENT
Start: 2022-08-14 | End: 2022-08-15

## 2022-08-14 RX ORDER — CARBOPROST TROMETHAMINE 250 UG/ML
250 INJECTION, SOLUTION INTRAMUSCULAR PRN
Status: DISCONTINUED | OUTPATIENT
Start: 2022-08-14 | End: 2022-08-15

## 2022-08-14 RX ORDER — METHYLERGONOVINE MALEATE 0.2 MG/ML
200 INJECTION INTRAVENOUS PRN
Status: DISCONTINUED | OUTPATIENT
Start: 2022-08-14 | End: 2022-08-15

## 2022-08-14 RX ORDER — DIPHENHYDRAMINE HCL 25 MG
25 TABLET ORAL EVERY 4 HOURS PRN
Status: DISCONTINUED | OUTPATIENT
Start: 2022-08-14 | End: 2022-08-15

## 2022-08-14 RX ORDER — SODIUM CHLORIDE, SODIUM LACTATE, POTASSIUM CHLORIDE, AND CALCIUM CHLORIDE .6; .31; .03; .02 G/100ML; G/100ML; G/100ML; G/100ML
1000 INJECTION, SOLUTION INTRAVENOUS PRN
Status: DISCONTINUED | OUTPATIENT
Start: 2022-08-14 | End: 2022-08-15

## 2022-08-14 RX ORDER — SODIUM CHLORIDE 9 MG/ML
25 INJECTION, SOLUTION INTRAVENOUS PRN
Status: DISCONTINUED | OUTPATIENT
Start: 2022-08-14 | End: 2022-08-15

## 2022-08-14 RX ORDER — SODIUM CHLORIDE, SODIUM LACTATE, POTASSIUM CHLORIDE, CALCIUM CHLORIDE 600; 310; 30; 20 MG/100ML; MG/100ML; MG/100ML; MG/100ML
INJECTION, SOLUTION INTRAVENOUS CONTINUOUS
Status: DISCONTINUED | OUTPATIENT
Start: 2022-08-14 | End: 2022-08-15

## 2022-08-14 RX ORDER — MISOPROSTOL 200 UG/1
800 TABLET ORAL PRN
Status: DISCONTINUED | OUTPATIENT
Start: 2022-08-14 | End: 2022-08-15

## 2022-08-14 RX ADMIN — Medication 1 MILLI-UNITS/MIN: at 22:48

## 2022-08-14 RX ADMIN — SODIUM CHLORIDE, POTASSIUM CHLORIDE, SODIUM LACTATE AND CALCIUM CHLORIDE: 600; 310; 30; 20 INJECTION, SOLUTION INTRAVENOUS at 22:46

## 2022-08-15 PROBLEM — Z3A.39 39 WEEKS GESTATION OF PREGNANCY: Status: ACTIVE | Noted: 2022-08-15

## 2022-08-15 LAB
ABO/RH: NORMAL
ANTIBODY SCREEN: NORMAL
HEPATITIS B SURFACE ANTIGEN INTERPRETATION: NORMAL
HIV AG/AB: NONREACTIVE
RPR: NORMAL

## 2022-08-15 PROCEDURE — 3700000025 EPIDURAL BLOCK: Performed by: NURSE ANESTHETIST, CERTIFIED REGISTERED

## 2022-08-15 PROCEDURE — 59400 OBSTETRICAL CARE: CPT | Performed by: OBSTETRICS & GYNECOLOGY

## 2022-08-15 PROCEDURE — 2500000003 HC RX 250 WO HCPCS: Performed by: NURSE ANESTHETIST, CERTIFIED REGISTERED

## 2022-08-15 PROCEDURE — 6360000002 HC RX W HCPCS: Performed by: NURSE ANESTHETIST, CERTIFIED REGISTERED

## 2022-08-15 PROCEDURE — 2580000003 HC RX 258: Performed by: OBSTETRICS & GYNECOLOGY

## 2022-08-15 PROCEDURE — 59025 FETAL NON-STRESS TEST: CPT | Performed by: OBSTETRICS & GYNECOLOGY

## 2022-08-15 PROCEDURE — 1220000000 HC SEMI PRIVATE OB R&B

## 2022-08-15 PROCEDURE — 2500000003 HC RX 250 WO HCPCS

## 2022-08-15 PROCEDURE — 6370000000 HC RX 637 (ALT 250 FOR IP): Performed by: OBSTETRICS & GYNECOLOGY

## 2022-08-15 RX ORDER — DOCUSATE SODIUM 100 MG/1
100 CAPSULE, LIQUID FILLED ORAL DAILY
Status: DISCONTINUED | OUTPATIENT
Start: 2022-08-15 | End: 2022-08-16 | Stop reason: HOSPADM

## 2022-08-15 RX ORDER — BISACODYL 10 MG
10 SUPPOSITORY, RECTAL RECTAL DAILY PRN
Status: DISCONTINUED | OUTPATIENT
Start: 2022-08-15 | End: 2022-08-16 | Stop reason: HOSPADM

## 2022-08-15 RX ORDER — LIDOCAINE HYDROCHLORIDE AND EPINEPHRINE 15; 5 MG/ML; UG/ML
INJECTION, SOLUTION EPIDURAL PRN
Status: DISCONTINUED | OUTPATIENT
Start: 2022-08-15 | End: 2022-08-15 | Stop reason: SDUPTHER

## 2022-08-15 RX ORDER — OXYTOCIN 10 [USP'U]/ML
INJECTION, SOLUTION INTRAMUSCULAR; INTRAVENOUS
Status: DISCONTINUED
Start: 2022-08-15 | End: 2022-08-15 | Stop reason: WASHOUT

## 2022-08-15 RX ORDER — MODIFIED LANOLIN
OINTMENT (GRAM) TOPICAL PRN
Status: DISCONTINUED | OUTPATIENT
Start: 2022-08-15 | End: 2022-08-16 | Stop reason: HOSPADM

## 2022-08-15 RX ORDER — OXYCODONE HYDROCHLORIDE 5 MG/1
5 TABLET ORAL EVERY 4 HOURS PRN
Status: DISCONTINUED | OUTPATIENT
Start: 2022-08-15 | End: 2022-08-16 | Stop reason: HOSPADM

## 2022-08-15 RX ORDER — SODIUM CHLORIDE 0.9 % (FLUSH) 0.9 %
5-40 SYRINGE (ML) INJECTION PRN
Status: DISCONTINUED | OUTPATIENT
Start: 2022-08-15 | End: 2022-08-16 | Stop reason: HOSPADM

## 2022-08-15 RX ORDER — MAG HYDROX/ALUMINUM HYD/SIMETH 400-400-40
SUSPENSION, ORAL (FINAL DOSE FORM) ORAL PRN
Status: DISCONTINUED | OUTPATIENT
Start: 2022-08-15 | End: 2022-08-16 | Stop reason: HOSPADM

## 2022-08-15 RX ORDER — SODIUM CHLORIDE 0.9 % (FLUSH) 0.9 %
5-40 SYRINGE (ML) INJECTION EVERY 12 HOURS SCHEDULED
Status: DISCONTINUED | OUTPATIENT
Start: 2022-08-15 | End: 2022-08-16 | Stop reason: HOSPADM

## 2022-08-15 RX ORDER — SODIUM CHLORIDE 9 MG/ML
INJECTION, SOLUTION INTRAVENOUS PRN
Status: DISCONTINUED | OUTPATIENT
Start: 2022-08-15 | End: 2022-08-16 | Stop reason: HOSPADM

## 2022-08-15 RX ORDER — ACETAMINOPHEN 500 MG
1000 TABLET ORAL EVERY 8 HOURS PRN
Status: DISCONTINUED | OUTPATIENT
Start: 2022-08-15 | End: 2022-08-16 | Stop reason: HOSPADM

## 2022-08-15 RX ORDER — IBUPROFEN 800 MG/1
800 TABLET ORAL EVERY 8 HOURS PRN
Status: DISCONTINUED | OUTPATIENT
Start: 2022-08-15 | End: 2022-08-16 | Stop reason: HOSPADM

## 2022-08-15 RX ORDER — SODIUM CHLORIDE 9 MG/ML
INJECTION, SOLUTION INTRAVENOUS
Status: DISCONTINUED
Start: 2022-08-15 | End: 2022-08-15 | Stop reason: WASHOUT

## 2022-08-15 RX ORDER — METHYLERGONOVINE MALEATE 0.2 MG/ML
INJECTION INTRAVENOUS
Status: DISCONTINUED
Start: 2022-08-15 | End: 2022-08-15 | Stop reason: WASHOUT

## 2022-08-15 RX ORDER — ONDANSETRON 4 MG/1
4 TABLET, ORALLY DISINTEGRATING ORAL EVERY 8 HOURS PRN
Status: DISCONTINUED | OUTPATIENT
Start: 2022-08-15 | End: 2022-08-16 | Stop reason: HOSPADM

## 2022-08-15 RX ORDER — CARBOPROST TROMETHAMINE 250 UG/ML
INJECTION, SOLUTION INTRAMUSCULAR
Status: DISCONTINUED
Start: 2022-08-15 | End: 2022-08-15 | Stop reason: WASHOUT

## 2022-08-15 RX ORDER — MISOPROSTOL 200 UG/1
TABLET ORAL
Status: DISCONTINUED
Start: 2022-08-15 | End: 2022-08-15 | Stop reason: WASHOUT

## 2022-08-15 RX ORDER — ONDANSETRON 2 MG/ML
4 INJECTION INTRAMUSCULAR; INTRAVENOUS EVERY 6 HOURS PRN
Status: DISCONTINUED | OUTPATIENT
Start: 2022-08-15 | End: 2022-08-16 | Stop reason: HOSPADM

## 2022-08-15 RX ORDER — IBUPROFEN 800 MG/1
800 TABLET ORAL EVERY 8 HOURS PRN
Status: DISCONTINUED | OUTPATIENT
Start: 2022-08-15 | End: 2022-08-15 | Stop reason: SDUPTHER

## 2022-08-15 RX ORDER — TRANEXAMIC ACID 100 MG/ML
INJECTION, SOLUTION INTRAVENOUS
Status: DISCONTINUED
Start: 2022-08-15 | End: 2022-08-15 | Stop reason: WASHOUT

## 2022-08-15 RX ORDER — CALCIUM CARBONATE 200(500)MG
500 TABLET,CHEWABLE ORAL 3 TIMES DAILY PRN
Status: DISCONTINUED | OUTPATIENT
Start: 2022-08-15 | End: 2022-08-16 | Stop reason: HOSPADM

## 2022-08-15 RX ADMIN — LIDOCAINE HYDROCHLORIDE AND EPINEPHRINE 3 ML: 15; 5 INJECTION, SOLUTION EPIDURAL at 01:48

## 2022-08-15 RX ADMIN — DOCUSATE SODIUM 100 MG: 100 CAPSULE, LIQUID FILLED ORAL at 20:56

## 2022-08-15 RX ADMIN — Medication 5 ML: at 01:48

## 2022-08-15 RX ADMIN — PHENYLEPHRINE HYDROCHLORIDE 100 MCG: 10 INJECTION INTRAVENOUS at 01:55

## 2022-08-15 RX ADMIN — SODIUM CHLORIDE, POTASSIUM CHLORIDE, SODIUM LACTATE AND CALCIUM CHLORIDE: 600; 310; 30; 20 INJECTION, SOLUTION INTRAVENOUS at 12:48

## 2022-08-15 RX ADMIN — Medication 127.5 ML: at 12:29

## 2022-08-15 RX ADMIN — IBUPROFEN 800 MG: 800 TABLET, FILM COATED ORAL at 23:08

## 2022-08-15 RX ADMIN — Medication 10 ML/HR: at 01:53

## 2022-08-15 RX ADMIN — IBUPROFEN 800 MG: 800 TABLET, FILM COATED ORAL at 15:51

## 2022-08-15 RX ADMIN — PHENYLEPHRINE HYDROCHLORIDE 100 MCG: 10 INJECTION INTRAVENOUS at 01:58

## 2022-08-15 RX ADMIN — ACETAMINOPHEN 1000 MG: 500 TABLET ORAL at 20:56

## 2022-08-15 ASSESSMENT — PAIN SCALES - GENERAL
PAINLEVEL_OUTOF10: 6
PAINLEVEL_OUTOF10: 5

## 2022-08-15 ASSESSMENT — PAIN DESCRIPTION - LOCATION
LOCATION: ABDOMEN
LOCATION: ABDOMEN

## 2022-08-15 ASSESSMENT — PAIN DESCRIPTION - DESCRIPTORS
DESCRIPTORS: CRAMPING
DESCRIPTORS: CRAMPING

## 2022-08-15 NOTE — FLOWSHEET NOTE
Call to Dr. Mary Larose to inform her that patient has reached the maximum Pitocin rate. Also updated on most recent SVE and contraction pattern. RN informed physician that patient takes Valtrex 500 mg daily at home, physician states to not administer this medication to patient during labor. No new orders received at this time. RN will continue to monitor.

## 2022-08-15 NOTE — FLOWSHEET NOTE
Called Tor PASTRANA about pts BP's    I let him know that she is already on her 4th liter of fluids     Pt is asymptomatic   FHR- WNL    Tor asked for me to call if the pt becomes symptomatic or if a change in FHR

## 2022-08-15 NOTE — FLOWSHEET NOTE
RN initiated 200ml fluid bolus due to low blood pressure. Patient asymptomatic at this time. CRNA notified.

## 2022-08-15 NOTE — ANESTHESIA PRE PROCEDURE
Department of Anesthesiology  Preprocedure Note       Name:  Jhonny Alexandra   Age:  19 y.o.  :  2000                                          MRN:  [id number]         Date:  2022      Surgeon: * No surgeons listed *    Procedure: * No procedures listed *    Medications prior to admission:   Prior to Admission medications    Medication Sig Start Date End Date Taking? Authorizing Provider   ondansetron (ZOFRAN-ODT) 4 MG disintegrating tablet Take 1 tablet by mouth 3 times daily as needed for Nausea or Vomiting 22   Andreia Leone MD   ondansetron (ZOFRAN-ODT) 4 MG disintegrating tablet Take 1 tablet by mouth 3 times daily as needed for Nausea or Vomiting 22   Christiano Chavira, DO   Moxcse-QsRqp-TnDdp-Meth-FA-DHA (PRENATE MINI) 18-0.6-0.4-350 MG CAPS Take 1 tablet by mouth daily 22   Christiano Chavira, DO   valACYclovir (VALTREX) 500 MG tablet Take 1 tablet by mouth daily 10/26/21   Christiano Chavira, DO       Current medications:    No current facility-administered medications for this encounter. Allergies: Allergies   Allergen Reactions    Pcn [Penicillins] Hives       Problem List:    Patient Active Problem List   Diagnosis Code    Candidal vaginitis B37.3    Bacterial vaginitis N76.0, B96.89    Intact amniotic membranes during pregnancy in third trimester Z34.93    34 weeks gestation of pregnancy Z3A.34    Vaginal spotting N93.9    Blood type O+ Z67.40    38 weeks gestation of pregnancy Z3A.38    Admitted to labor and delivery Z78.9    Post-term pregnancy, 40-42 weeks of gestation O46.0       Past Medical History:        Diagnosis Date    Herpes simplex virus (HSV) infection        Past Surgical History:  No past surgical history on file.     Social History:    Social History     Tobacco Use    Smoking status: Former     Packs/day: 0.25     Years: 1.00     Pack years: 0.25     Types: Cigarettes    Smokeless tobacco: Never    Tobacco comments:     every couple days I may have a cig   Substance Use Topics    Alcohol use: Not Currently     Alcohol/week: 0.0 standard drinks     Comment: soacially before pregancy                                Counseling given: Not Answered  Tobacco comments: every couple days I may have a cig      Vital Signs (Current): There were no vitals filed for this visit. BP Readings from Last 3 Encounters:   08/09/22 102/64   08/02/22 104/74   07/26/22 108/70       NPO Status:                                                                                 BMI:   Wt Readings from Last 3 Encounters:   08/09/22 178 lb (80.7 kg)   08/02/22 178 lb (80.7 kg)   07/26/22 178 lb (80.7 kg)     There is no height or weight on file to calculate BMI.    CBC:   Lab Results   Component Value Date/Time    WBC 7.8 01/26/2022 04:15 PM    RBC 4.60 01/26/2022 04:15 PM    HGB 11.6 05/25/2022 09:30 AM    HCT 34.8 05/25/2022 09:30 AM    MCV 91.4 01/26/2022 04:15 PM    RDW 13.6 01/26/2022 04:15 PM     01/26/2022 04:15 PM       CMP:   Lab Results   Component Value Date/Time     01/26/2022 04:15 PM    K 4.8 01/26/2022 04:15 PM     01/26/2022 04:15 PM    CO2 21 01/26/2022 04:15 PM    BUN 5 01/26/2022 04:15 PM    CREATININE 0.58 01/26/2022 04:15 PM    GFRAA >60.0 01/26/2022 04:15 PM    LABGLOM >60.0 01/26/2022 04:15 PM    GLUCOSE 79 01/26/2022 04:15 PM    GLUCOSE 103 12/30/2019 06:18 AM    PROT 7.9 01/26/2022 04:15 PM    CALCIUM 9.4 01/26/2022 04:15 PM    BILITOT 0.3 01/26/2022 04:15 PM    ALKPHOS 87 01/26/2022 04:15 PM    AST 23 01/26/2022 04:15 PM    ALT 24 01/26/2022 04:15 PM       POC Tests: No results for input(s): POCGLU, POCNA, POCK, POCCL, POCBUN, POCHEMO, POCHCT in the last 72 hours.     Coags: No results found for: PROTIME, INR, APTT    HCG (If Applicable):   Lab Results   Component Value Date    PREGTESTUR negative 08/01/2017        ABGs: No results found for: PHART, PO2ART, XVH2VLY, DRC3DQZ, BEART, T5LVJETE     Type & Screen (If Applicable):  No results found for: LABABO, LABRH    Drug/Infectious Status (If Applicable):  No results found for: HIV, HEPCAB    COVID-19 Screening (If Applicable):   Lab Results   Component Value Date/Time    COVID19 DETECTED 07/01/2022 12:25 PM    COVID19 Not Detected 02/09/2021 03:59 PM           Anesthesia Evaluation  Patient summary reviewed no history of anesthetic complications:   Airway: Mallampati: II  TM distance: >3 FB   Neck ROM: full  Mouth opening: > = 3 FB   Dental: normal exam         Pulmonary:Negative Pulmonary ROS and normal exam                               Cardiovascular:Negative CV ROS                      Neuro/Psych:   Negative Neuro/Psych ROS              GI/Hepatic/Renal: Neg GI/Hepatic/Renal ROS            Endo/Other: Negative Endo/Other ROS                    Abdominal:             Vascular: negative vascular ROS. Other Findings: gravid          Anesthesia Plan      epidural     ASA 2             Anesthetic plan and risks discussed with patient. Use of blood products discussed with patient whom consented to blood products. Plan discussed with attending.                     Guero Valadezr, APRN - CRNA   8/14/2022

## 2022-08-15 NOTE — ANESTHESIA PROCEDURE NOTES
Epidural Block    Patient location during procedure: OB  Start time: 8/15/2022 1:25 AM  End time: 8/15/2022 2:04 AM  Reason for block: labor epidural  Staffing  Performed: resident/CRNA   Resident/CRNA: GENNARO Sheppard CRNA  Epidural  Patient position: sitting  Prep: Betadine  Patient monitoring: continuous pulse ox and frequent blood pressure checks  Approach: midline  Location: L3-4  Injection technique: PEDRO saline  Provider prep: mask and sterile gloves  Needle  Needle type: Tuohy   Needle gauge: 17 G  Needle length: 3.5 in  Needle insertion depth: 7 cm  Catheter type: end hole  Catheter size: 20 G  Catheter at skin depth: 14 cm  Test dose: negativeCatheter Secured: tegaderm and tape  Assessment  Sensory level: T10  Hemodynamics: stable  Attempts: 1  Outcomes: patient tolerated procedure well and uncomplicated  Preanesthetic Checklist  Completed: patient identified, IV checked, site marked, risks and benefits discussed, surgical/procedural consents, equipment checked, pre-op evaluation, timeout performed, anesthesia consent given, oxygen available, monitors applied/VS acknowledged, fire risk safety assessment completed and verbalized and blood product R/B/A discussed and consented

## 2022-08-15 NOTE — OP NOTE
PROCEDURE NOTE: VAGINAL DELIVERY  19 y.o. Carmen Gonzalez at 39w2d GA who presented to [facility] triage for induction of labor. Pt underwent induction of labor with pitocin and AROM noting clear fluid. Pt with epidural for pain management. Pt then with rectal pressure and the desire to push. PT was instructed on pushing efforts and the infant's head was delivered atraumatically followed quickly by the rest of the body. The infant with spontaneous cry was then laid on the mother's abdomen and the cord remained intact for 1 minute for delayed cord clamping. The cord was then clamped and cut and the infant was placed for skin to skin care. The cord blood was then drawn for labs and the placenta delivered spontaneously. Three vessel cord noted. The vaginal and cervix were inspected and no lacerations were noted. The infant, a viable female infant was born at 18 with Apgars 8 and 9 and wt pending  Mother was noted to have excellent uterine tone. Sponge and instrument counts were correct x 2 and mother and baby were recovered in room without difficulty.      EBL:   Moanaladele Cheng, DO

## 2022-08-15 NOTE — FLOWSHEET NOTE
Dr. Jet Durbin updated on most recent SVE, Pitocin rate, patient position, and contraction pattern. No new orders received.

## 2022-08-15 NOTE — FLOWSHEET NOTE
Dr. Pyle Holding at bedside. SVE performed by physician. AROM performed. Physician informed RN that fluid was clear. Physician gave verbal order to RN to increase Pitocin above 20mu as tolerated.

## 2022-08-15 NOTE — ANESTHESIA POSTPROCEDURE EVALUATION
Department of Anesthesiology  Postprocedure Note    Patient: Wanda Michel  MRN: [id number]  YOB: 2000  Date of evaluation: 8/15/2022      Procedure Summary     Date: 08/15/22 Room / Location:     Anesthesia Start: 0125 Anesthesia Stop:     Procedure: Labor Analgesia Diagnosis:     Scheduled Providers:  Responsible Provider: GENNARO Barbour CRNA    Anesthesia Type: epidural ASA Status: 2          Anesthesia Type: No value filed.     Abel Phase I:      Abel Phase II:        Anesthesia Post Evaluation    Patient location during evaluation: bedside  Patient participation: complete - patient participated  Level of consciousness: awake and alert  Pain score: 0  Airway patency: patent  Nausea & Vomiting: no nausea  Complications: no  Cardiovascular status: hemodynamically stable  Respiratory status: acceptable  Hydration status: stable

## 2022-08-15 NOTE — FLOWSHEET NOTE
Patient up with RN assist x1 with sera steady. Patient able to void 300 ml in bathroom. Patient tolerated well.

## 2022-08-15 NOTE — FLOWSHEET NOTE
Patient moved to room 330 via [employer] steady. Patient tolerated well. Patients FOB moved all belongings from room 322 to 330.

## 2022-08-15 NOTE — H&P
Department of Obstetrics and Gynecology  Attending Obstetrics History and Physical        CHIEF COMPLAINT: scheduled induction    HISTORY OF PRESENT ILLNESS:      The patient is  multiparous female presents for schedule induction no complaints or pregnancy complications  Past Medical History:        Diagnosis Date    Herpes simplex virus (HSV) infection      Past Surgical History:    No past surgical history on file. Social History:    TOBACCO:   reports that she has quit smoking. Her smoking use included cigarettes. She has a 0.25 pack-year smoking history. She has never used smokeless tobacco.  ETOH:   reports that she does not currently use alcohol. DRUGS:   reports no history of drug use. SEXUAL HISTORY:    DOMESTIC VIOLENCE:  no  ACTIVITIES OF DAILY LIVING:    INSTRUMENTAL ACTIVITIES OF DAILY LIVING:  Patient is able to perform all instrumental activities of daily living. MARITAL STATUS:    OCCUPATION:    LEVEL OF EDUCATION:    Patient currently lives with family   Environmental/chemical exposure:  None  Travel History:  None   Pets:  None    Family History:       Problem Relation Age of Onset    Cancer Sister         leukemia    Heart Disease Maternal Uncle      Medications Prior to Admission:  Medications Prior to Admission: ondansetron (ZOFRAN-ODT) 4 MG disintegrating tablet, Take 1 tablet by mouth 3 times daily as needed for Nausea or Vomiting  ondansetron (ZOFRAN-ODT) 4 MG disintegrating tablet, Take 1 tablet by mouth 3 times daily as needed for Nausea or Vomiting (Patient not taking: Reported on 8/14/2022)  Wfrwtt-LgJgv-MoAdt-Meth-FA-DHA (PRENATE MINI) 18-0.6-0.4-350 MG CAPS, Take 1 tablet by mouth daily  valACYclovir (VALTREX) 500 MG tablet, Take 1 tablet by mouth daily  Allergies:  Pcn [penicillins]    REVIEW OF SYSTEMS:    Patient has no history of depression.   Patient has no symptoms of depression  CONSTITUTIONAL:  negative for  fevers, chills, and sweats  RESPIRATORY:  negative for  dry cough, dyspnea, and wheezing  CARDIOVASCULAR:  negative for  chest pain, dyspnea  GASTROINTESTINAL:  negative for nausea, vomiting, diarrhea, and constipation  GENITOURINARY:  negative for frequency, dysuria, and genital lesions and vaginal discharge  NEUROLOGICAL:  negative for headaches and dizziness  BEHAVIOR/PSYCH:  negative for depressed mood and anxiety    PHYSICAL EXAM:    General appearance:  awake, alert, cooperative, no apparent distress, and appears stated age  Neurologic:  Awake, alert, oriented to name, place and time. Cranial nerves II-XII are grossly intact. Motor is 5 out of 5 bilaterally. Cerebellar finger to nose, heel to shin intact. Sensory is intact.   Babinski down going, Romberg negative, and gait is normal.  Lungs:  No increased work of breathing, good air exchange, clear to auscultation bilaterally, no crackles or wheezing  Heart:  Normal apical impulse, regular rate and rhythm, normal S1 and S2, no S3 or S4, and no murmur noted  Abdomen:  No scars, normal bowel sounds, soft, non-distended, non-tender, no masses palpated, no hepatosplenomegally    Fetal heart rate:  Baseline Heart Rate 160, accelerations:  present  long term variability:  moderate  Pelvis:  External Genitalia: General appearance; normal, Hair distribution; normal, Lesions absent  Anus/Perineum: Lesions absent and Masses absent  Cervix:    DILATION:  3 cm  EFFACEMENT:   75%  STATION:  -3 cm  CONSISTENCY:  medium  POSITION:  mid      Contraction frequency:  6 minutes  Membranes:  Ruptured clear fluid        General Labs:   Admission on 08/14/2022   Component Date Value Ref Range Status    ABO/Rh 08/14/2022 O POS   Final    Antibody Screen 08/14/2022 NEG   Final    WBC 08/14/2022 11.3 (A) 4.8 - 10.8 K/uL Final    RBC 08/14/2022 3.50 (A) 4.20 - 5.40 M/uL Final    Hemoglobin 08/14/2022 9.8 (A) 12.0 - 16.0 g/dL Final    Hematocrit 08/14/2022 30.2 (A) 37.0 - 47.0 % Final    MCV 08/14/2022 86.4  82.0 - 100.0 fL Final    [facility] 08/14/2022 28.2  27.0 - 31.3 pg Final    MCHC 08/14/2022 32.6 (A) 33.0 - 37.0 % Final    RDW 08/14/2022 15.1 (A) 11.5 - 14.5 % Final    Platelets 80/00/6419 262  130 - 400 K/uL Final    Neutrophils % 08/14/2022 68.4  % Final    Lymphocytes % 08/14/2022 24.5  % Final    Monocytes % 08/14/2022 5.4  % Final    Eosinophils % 08/14/2022 1.4  % Final    Basophils % 08/14/2022 0.3  % Final    Neutrophils Absolute 08/14/2022 7.7 (A) 1.4 - 6.5 K/uL Final    Lymphocytes Absolute 08/14/2022 2.8  1.0 - 4.8 K/uL Final    Monocytes Absolute 08/14/2022 0.6  0.2 - 0.8 K/uL Final    Eosinophils Absolute 08/14/2022 0.2  0.0 - 0.7 K/uL Final    Basophils Absolute 08/14/2022 0.0  0.0 - 0.2 K/uL Final    Color, UA 08/14/2022 Yellow  Straw/Yellow Final    Clarity, UA 08/14/2022 CLOUDY (A) Clear Final    Glucose, Ur 08/14/2022 Negative  Negative mg/dL Final    Bilirubin Urine 08/14/2022 Negative  Negative Final    Ketones, Urine 08/14/2022 Negative  Negative mg/dL Final    Specific Gravity, UA 08/14/2022 1.012  1.005 - 1.030 Final    Blood, Urine 08/14/2022 Negative  Negative Final    pH, UA 08/14/2022 7.0  5.0 - 9.0 Final    Protein, UA 08/14/2022 Negative  Negative mg/dL Final    Urobilinogen, Urine 08/14/2022 0.2  <2.0 E.U./dL Final    Nitrite, Urine 08/14/2022 Negative  Negative Final    Leukocyte Esterase, Urine 08/14/2022 LARGE (A) Negative Final    Sodium 08/14/2022 138  135 - 144 mEq/L Final    Potassium 08/14/2022 3.9  3.4 - 4.9 mEq/L Final    Chloride 08/14/2022 105  95 - 107 mEq/L Final    CO2 08/14/2022 16 (A) 20 - 31 mEq/L Final    Anion Gap 08/14/2022 17 (A) 9 - 15 mEq/L Final    Glucose 08/14/2022 67 (A) 70 - 99 mg/dL Final    BUN 08/14/2022 8  6 - 20 mg/dL Final    Creatinine 08/14/2022 0.56  0.50 - 0.90 mg/dL Final    GFR Non- 08/14/2022 >60.0  >60 Final    Comment: >60 mL/min/1.73m2 EGFR, calc. for ages 25 and older using the  MDRD formula (not corrected for weight), is valid for stable  renal function.       GFR  08/14/2022 >60.0  >60 Final    Comment: >60 mL/min/1.73m2 EGFR, calc. for ages 25 and older using the  MDRD formula (not corrected for weight), is valid for stable  renal function. Calcium 08/14/2022 8.7  8.5 - 9.9 mg/dL Final    Total Protein 08/14/2022 6.5  6.3 - 8.0 g/dL Final    Albumin 08/14/2022 3.7  3.5 - 4.6 g/dL Final    Total Bilirubin 08/14/2022 0.3  0.2 - 0.7 mg/dL Final    Alkaline Phosphatase 08/14/2022 179 (A) 40 - 130 U/L Final    ALT 08/14/2022 6  0 - 33 U/L Final    AST 08/14/2022 13  0 - 35 U/L Final    Globulin 08/14/2022 2.8  2.3 - 3.5 g/dL Final    SARS-CoV-2, NAAT 08/14/2022 Not Detected  Not Detected Final    Comment: Rapid NAAT:   Negative results should be treated as presumptive and,  if inconsistent with clinical signs and symptoms or necessary for  patient management, should be tested with an alternative molecular  assay. Negative results do not preclude SARS-CoV-2 infection and  should not be used as the sole basis for patient management decisions. This test has been authorized by the FDA under an Emergency Use  Authorization (EUA) for use by authorized laboratories. Fact sheet for Healthcare Providers:  BuildHer.es  Fact sheet for Patients: BuildHer.es    METHODOLOGY: Isothermal Nucleic Acid Amplification      Hep B S Ag Interp 08/14/2022 Non-reactive   Final    PCP Screen, Urine 08/14/2022 Neg  Negative <25 ng/mL Final    Benzodiazepine Screen, Urine 08/14/2022 Neg  Negative <150 ng/mL Final    Comment: Effective:  7/16/18  Methodology and/or Reference Range-Cutoff has changed. Cocaine Metabolite Screen, Urine 08/14/2022 Neg  Negative <150 ng/mL Final    Comment: Effective:  7/16/18  Methodology and/or Reference Range-Cutoff has changed.       Amphetamine Screen, Urine 08/14/2022 Neg  Negative <500 ng/mL Final    Comment: Effective:  7/16/18  Methodology and/or Reference Range-Cutoff has changed. Cannabinoid Scrn, Ur 2022 Neg  Negative <50 ng/mL Final    Opiate Scrn, Ur 2022 Neg  Negative <100 ng/mL Final    Comment: Effective:  18  Methodology and/or Reference Range-Cutoff has changed. Barbiturate Screen, Ur 2022 Neg  Negative <200 ng/mL Final    Comment: Effective:  18  Methodology and/or Reference Range-Cutoff has changed. Methadone Screen, Urine 2022 Neg  Negative <200 ng/mL Final    Propoxyphene Screen, Urine 2022 Neg  Negative <300 ng/mL Final    UR Oxycodone Rapid Screen 2022 Neg  Negative <100 ng/mL Final    Drug Screen Comment: 2022 see below   Final    Comment: This method is a screening test to detect only these drug  classes as part of a medical workup. Confirmatory testing  by another method should be ordered if clinically indicated. Bacteria, UA 2022 FEW (A) Negative /HPF Final    Hyaline Casts, UA 2022 0-1  0 - 5 /HPF Final    WBC, UA 2022 20-50 (A) 0 - 5 /HPF Final    RBC, UA 2022 0-2  0 - 5 /HPF Final    Epithelial Cells, UA 2022 10-20  0 - 5 /HPF Final       ASSESSMENT AND PLAN:    The patient is a 19 y.o.  @ 39w2d.     Reactive NSt  Reassuring status continue with current induction management

## 2022-08-15 NOTE — FLOWSHEET NOTE
KASSIDY performed, 9/100/-1. RN informed Dr. Abbie Vasques of [facility]. Orders received to turn Pitocin off and that she will finish her surgery and then report to floor for delivery.

## 2022-08-16 VITALS
OXYGEN SATURATION: 98 % | HEART RATE: 61 BPM | SYSTOLIC BLOOD PRESSURE: 116 MMHG | RESPIRATION RATE: 20 BRPM | DIASTOLIC BLOOD PRESSURE: 71 MMHG | TEMPERATURE: 98.1 F

## 2022-08-16 LAB
HCT VFR BLD CALC: 26.6 % (ref 37–47)
HEMOGLOBIN: 8.8 G/DL (ref 12–16)
MCH RBC QN AUTO: 28.4 PG (ref 27–31.3)
MCHC RBC AUTO-ENTMCNC: 33 % (ref 33–37)
MCV RBC AUTO: 86.1 FL (ref 82–100)
PDW BLD-RTO: 15.2 % (ref 11.5–14.5)
PLATELET # BLD: 211 K/UL (ref 130–400)
RBC # BLD: 3.09 M/UL (ref 4.2–5.4)
WBC # BLD: 11.5 K/UL (ref 4.8–10.8)

## 2022-08-16 PROCEDURE — 36415 COLL VENOUS BLD VENIPUNCTURE: CPT

## 2022-08-16 PROCEDURE — 7200000001 HC VAGINAL DELIVERY

## 2022-08-16 PROCEDURE — 99024 POSTOP FOLLOW-UP VISIT: CPT | Performed by: OBSTETRICS & GYNECOLOGY

## 2022-08-16 PROCEDURE — 6370000000 HC RX 637 (ALT 250 FOR IP): Performed by: OBSTETRICS & GYNECOLOGY

## 2022-08-16 PROCEDURE — 85027 COMPLETE CBC AUTOMATED: CPT

## 2022-08-16 RX ORDER — IBUPROFEN 600 MG/1
600 TABLET ORAL EVERY 6 HOURS PRN
Qty: 120 TABLET | Refills: 3 | Status: SHIPPED | OUTPATIENT
Start: 2022-08-16

## 2022-08-16 RX ADMIN — DOCUSATE SODIUM 100 MG: 100 CAPSULE, LIQUID FILLED ORAL at 08:59

## 2022-08-16 RX ADMIN — ACETAMINOPHEN 1000 MG: 500 TABLET ORAL at 05:11

## 2022-08-16 RX ADMIN — OXYCODONE 5 MG: 5 TABLET ORAL at 01:21

## 2022-08-16 RX ADMIN — IBUPROFEN 800 MG: 800 TABLET, FILM COATED ORAL at 08:59

## 2022-08-16 RX ADMIN — IBUPROFEN 800 MG: 800 TABLET, FILM COATED ORAL at 18:18

## 2022-08-16 ASSESSMENT — PAIN DESCRIPTION - LOCATION
LOCATION: ABDOMEN

## 2022-08-16 ASSESSMENT — PAIN - FUNCTIONAL ASSESSMENT
PAIN_FUNCTIONAL_ASSESSMENT: ACTIVITIES ARE NOT PREVENTED
PAIN_FUNCTIONAL_ASSESSMENT: ACTIVITIES ARE NOT PREVENTED

## 2022-08-16 ASSESSMENT — PAIN SCALES - GENERAL
PAINLEVEL_OUTOF10: 2
PAINLEVEL_OUTOF10: 6
PAINLEVEL_OUTOF10: 3
PAINLEVEL_OUTOF10: 10

## 2022-08-16 ASSESSMENT — PAIN DESCRIPTION - ORIENTATION
ORIENTATION: LOWER;MID
ORIENTATION: LOWER;MID

## 2022-08-16 ASSESSMENT — PAIN DESCRIPTION - DESCRIPTORS
DESCRIPTORS: CRAMPING

## 2022-08-16 NOTE — PLAN OF CARE
Problem: Vaginal Birth or  Section  Goal: Fetal and maternal status remain reassuring during the birth process  Description:  Birth OB-Pregnancy care plan goal which identifies if the fetal and maternal status remain reassuring during the birth process  8/15/2022 2127 by Abigail Wharton RN  Outcome: Progressing  8/15/2022 0746 by Mj Vasques RN  Outcome: Progressing     Problem: Pain  Goal: Verbalizes/displays adequate comfort level or baseline comfort level  8/15/2022 2127 by Abigail Wharton RN  Outcome: Progressing  8/15/2022 0746 by Mj Vasques RN  Outcome: Progressing     Problem: Infection - Adult  Goal: Absence of infection at discharge  8/15/2022 2127 by Abigail Wharton RN  Outcome: Progressing  8/15/2022 0746 by Mj Vasques RN  Outcome: Progressing  Goal: Absence of infection during hospitalization  8/15/2022 2127 by Abigail Wharton RN  Outcome: Progressing  8/15/2022 0746 by Mj Vasques RN  Outcome: Progressing  Goal: Absence of fever/infection during anticipated neutropenic period  8/15/2022 2127 by Abigail Wharton RN  Outcome: Progressing  8/15/2022 0746 by Mj Vasques RN  Outcome: Progressing     Problem: Safety - Adult  Goal: Free from fall injury  8/15/2022 2127 by Abigail Wharton RN  Outcome: Progressing  8/15/2022 0746 by Mj Vasques RN  Outcome: Progressing     Problem: Chronic Conditions and Co-morbidities  Goal: Patient's chronic conditions and co-morbidity symptoms are monitored and maintained or improved  8/15/2022 2127 by Abigail Wharton RN  Outcome: Progressing  8/15/2022 0746 by Mj Vasques RN  Outcome: Progressing     Problem: Postpartum  Goal: Experiences normal postpartum course  Description:  Postpartum OB-Pregnancy care plan goal which identifies if the mother is experiencing a normal postpartum course  8/15/2022 2127 by Abigail Wharton RN  Outcome: Progressing  8/15/2022 1515 by Mj Vasques RN  Outcome: Progressing  Goal: Appropriate maternal -  bonding  Description:  Postpartum OB-Pregnancy care plan goal which identifies if the mother and  are bonding appropriately  8/15/2022 2127 by Clover Chen RN  Outcome: Progressing  8/15/2022 1515 by Nelson Lopez RN  Outcome: Progressing  Goal: Establishment of infant feeding pattern  Description:  Postpartum OB-Pregnancy care plan goal which identifies if the mother is establishing a feeding pattern with their   8/15/2022 2127 by Clover Chen RN  Outcome: Progressing  8/15/2022 1515 by Nelson Lopez RN  Outcome: Progressing  Goal: Incisions, wounds, or drain sites healing without S/S of infection  8/15/2022 2127 by Clover Chen RN  Outcome: Progressing  8/15/2022 1515 by Nelson Lopez RN  Outcome: Progressing

## 2022-08-16 NOTE — PLAN OF CARE
Problem: Vaginal Birth or  Section  Goal: Fetal and maternal status remain reassuring during the birth process  2022 1830 by Marcin Santiago RN  Outcome: Completed  2022 09 by Marcin Santiago RN  Outcome: Progressing     Problem: Pain  Goal: Verbalizes/displays adequate comfort level or baseline comfort level  2022 1830 by Marcin Santiago RN  Outcome: Completed  2022 09 by Marcin Santiago RN  Outcome: Progressing     Problem: Infection - Adult  Goal: Absence of infection at discharge  2022 1830 by Marcin Santiago RN  Outcome: Completed  2022 09 by Marcin Santiago RN  Outcome: Progressing  Goal: Absence of infection during hospitalization  2022 1830 by Marcin Santiago RN  Outcome: Completed  2022 09 by Marcin Santiago RN  Outcome: Progressing  Goal: Absence of fever/infection during anticipated neutropenic period  2022 183 by Marcin Santiago RN  Outcome: Completed  2022 09 by Marcin Santiago RN  Outcome: Progressing     Problem: Safety - Adult  Goal: Free from fall injury  2022 183 by Marcin Santiago RN  Outcome: Completed  2022 09 by Marcin Santiago RN  Outcome: Progressing     Problem: Chronic Conditions and Co-morbidities  Goal: Patient's chronic conditions and co-morbidity symptoms are monitored and maintained or improved  2022 183 by Marcin Santiago RN  Outcome: Completed  2022 09 by Marcin Santiago RN  Outcome: Progressing     Problem: Postpartum  Goal: Experiences normal postpartum course  2022 1830 by Marcin Santiago RN  Outcome: Completed  2022 09 by Marcin Santiago RN  Outcome: Progressing  Goal: Appropriate maternal -  bonding  2022 183 by Marcin Santiago RN  Outcome: Completed  2022 by Marcin Santiago RN  Outcome: Progressing  Goal: Establishment of infant feeding pattern  2022 183 by Marcin Santiago RN  Outcome: Completed  8/16/2022 0933 by Vivek Canas RN  Outcome: Progressing  Goal: Incisions, wounds, or drain sites healing without S/S of infection  8/16/2022 1830 by Vivek Canas RN  Outcome: Completed  8/16/2022 0933 by Vivek Canas RN  Outcome: Progressing

## 2022-08-16 NOTE — PLAN OF CARE
Problem: Vaginal Birth or  Section  Goal: Fetal and maternal status remain reassuring during the birth process  2022 by Anita Lui RN  Outcome: Progressing  8/15/2022 2127 by Arden Fontenot RN  Outcome: Progressing     Problem: Pain  Goal: Verbalizes/displays adequate comfort level or baseline comfort level  2022 by Anita Lui RN  Outcome: Progressing  8/15/2022 2127 by Arden Fontenot RN  Outcome: Progressing     Problem: Infection - Adult  Goal: Absence of infection at discharge  2022 by Anita Lui RN  Outcome: Progressing  8/15/2022 2127 by Adren Fontenot RN  Outcome: Progressing  Goal: Absence of infection during hospitalization  2022 by Anita Lui RN  Outcome: Progressing  8/15/2022 2127 by Arden Fontenot RN  Outcome: Progressing  Goal: Absence of fever/infection during anticipated neutropenic period  2022 by Anita Lui RN  Outcome: Progressing  8/15/2022 2127 by Arden Fontenot RN  Outcome: Progressing     Problem: Safety - Adult  Goal: Free from fall injury  2022 by Anita Lui RN  Outcome: Progressing  8/15/2022 2127 by Arden Fontenot RN  Outcome: Progressing     Problem: Chronic Conditions and Co-morbidities  Goal: Patient's chronic conditions and co-morbidity symptoms are monitored and maintained or improved  2022 by Anita Lui RN  Outcome: Progressing  8/15/2022 2127 by Arden Fontenot RN  Outcome: Progressing     Problem: Postpartum  Goal: Experiences normal postpartum course  2022 by Anita Lui RN  Outcome: Progressing  8/15/2022 2127 by Arden Fontenot RN  Outcome: Progressing  Goal: Appropriate maternal -  bonding  2022 by Anita Lui RN  Outcome: Progressing  8/15/2022 2127 by Arden Fontenot RN  Outcome: Progressing  Goal: Establishment of infant feeding pattern  2022 by Anita Lui RN  Outcome: Progressing  8/15/2022 2127 by Galo Mixer, RN  Outcome: Progressing  Goal: Incisions, wounds, or drain sites healing without S/S of infection  8/16/2022 0933 by Flores Vazquez RN  Outcome: Progressing  8/15/2022 2127 by Clover Chen RN  Outcome: Progressing

## 2022-08-16 NOTE — DISCHARGE SUMMARY
DISCHARGE SUMMARY: POSTPARTUM DAY 1    Pt doing well. Pt is bottle feeding without issue. Pt eager for discharge. /66   Pulse 75   Temp 97.9 °F (36.6 °C)   Resp 20   LMP  (LMP Unknown)   SpO2 98%   Breastfeeding Unknown   Hemoglobin   Date Value Ref Range Status   08/16/2022 8.8 (L) 12.0 - 16.0 g/dL Final     CVS: RRR  Lungs: CTAB  ABD: Uterus firm at umbilicus  EXT: no c/c/e    19 y.o. [id number] now P2 s/p vaginal delivery doing well PPD#2  1. Discharge pt to home  2. Rx Ibuprofen  3.  Follow up with Dr. Citlalli Valdez MD

## 2022-08-30 ENCOUNTER — OFFICE VISIT (OUTPATIENT)
Dept: OBGYN CLINIC | Age: 22
End: 2022-08-30

## 2022-08-30 VITALS
HEART RATE: 71 BPM | WEIGHT: 161 LBS | HEIGHT: 65 IN | DIASTOLIC BLOOD PRESSURE: 62 MMHG | SYSTOLIC BLOOD PRESSURE: 98 MMHG | BODY MASS INDEX: 26.82 KG/M2

## 2022-08-30 DIAGNOSIS — N93.8 DUB (DYSFUNCTIONAL UTERINE BLEEDING): Primary | ICD-10-CM

## 2022-08-30 PROCEDURE — 1111F DSCHRG MED/CURRENT MED MERGE: CPT | Performed by: OBSTETRICS & GYNECOLOGY

## 2022-08-30 PROCEDURE — G8427 DOCREV CUR MEDS BY ELIG CLIN: HCPCS | Performed by: OBSTETRICS & GYNECOLOGY

## 2022-08-30 PROCEDURE — 0503F POSTPARTUM CARE VISIT: CPT | Performed by: OBSTETRICS & GYNECOLOGY

## 2022-08-30 PROCEDURE — G8419 CALC BMI OUT NRM PARAM NOF/U: HCPCS | Performed by: OBSTETRICS & GYNECOLOGY

## 2022-08-30 PROCEDURE — 1036F TOBACCO NON-USER: CPT | Performed by: OBSTETRICS & GYNECOLOGY

## 2022-08-30 RX ORDER — LEVONORGESTREL AND ETHINYL ESTRADIOL 0.15-0.03
1 KIT ORAL DAILY
Qty: 1 PACKET | Refills: 11 | Status: SHIPPED | OUTPATIENT
Start: 2022-08-30 | End: 2022-10-15

## 2022-08-31 NOTE — PROGRESS NOTES
SUBJECTIVE:   Pt is 2 wks post partum and  here for follow up to delivery. Pt bottle/breast feeding without difficulty. Pt with normal postpartum irregular VB since delivery and no complaints. Denies any depression and pain is well controlled. Patient is a few days status post transfusion and therapeutic D&C for postpartum bleeding. Questionable retained products versus uterine clot    Review of Systems:  General ROS: negative  Psychological ROS: negative  ENT ROS: negative  Endocrine ROS: negative  Respiratory ROS: no cough, shortness of breath, or wheezing  Cardiovascular ROS: no chest pain or dyspnea on exertion  Gastrointestinal ROS: no abdominal pain, change in bowel habits, or black or bloody stools  Genito-Urinary ROS: no dysuria, trouble voiding, or hematuria  Musculoskeletal ROS: negative  Neurological ROS: no TIA or stroke symptoms  Dermatological ROS: negative    OBJECTIVE:   BP 98/62   Pulse 71   Ht 5' 5\" (1.651 m)   Wt 161 lb (73 kg)   LMP  (LMP Unknown)   Breastfeeding No   BMI 26.79 kg/m²     Physical Exam:  GEN: She appears well, afebrile. HEENT: normal cephalic, atraumatic  CVS: regular rate and rhythm  ABDOMEN: benign, soft, nontender, no masses. MUSCULOSKELETAL: normal gait, no masses  SKIN: normal texture and tone, no lesions  NEURO: normal tone, no hyperreflexia, 1+DTRs throughout    Pelvic Exam:   Deferred at this time    ASSESSMENT:   Routine Postpartum follow up      PLAN:     Past medical, social and family history reviewed and updated in pt's chart. Post partum care reviewed with patient. Pt adjusting well to infant and denies any depressions sx. Risks, benefits and alternative therapies for metrorrhagia discussed.   We will trial a combination birth control at this time  Post partum exam in 4 weeks

## 2022-09-27 ENCOUNTER — TELEPHONE (OUTPATIENT)
Dept: OBGYN CLINIC | Age: 22
End: 2022-09-27

## 2022-09-27 ENCOUNTER — OFFICE VISIT (OUTPATIENT)
Dept: OBGYN CLINIC | Age: 22
End: 2022-09-27
Payer: COMMERCIAL

## 2022-09-27 VITALS
BODY MASS INDEX: 27.32 KG/M2 | SYSTOLIC BLOOD PRESSURE: 110 MMHG | HEART RATE: 65 BPM | DIASTOLIC BLOOD PRESSURE: 68 MMHG | WEIGHT: 164 LBS | HEIGHT: 65 IN

## 2022-09-27 DIAGNOSIS — N93.8 DUB (DYSFUNCTIONAL UTERINE BLEEDING): ICD-10-CM

## 2022-09-27 PROCEDURE — G8427 DOCREV CUR MEDS BY ELIG CLIN: HCPCS | Performed by: OBSTETRICS & GYNECOLOGY

## 2022-09-27 PROCEDURE — 1036F TOBACCO NON-USER: CPT | Performed by: OBSTETRICS & GYNECOLOGY

## 2022-09-27 PROCEDURE — 0503F POSTPARTUM CARE VISIT: CPT | Performed by: OBSTETRICS & GYNECOLOGY

## 2022-09-27 PROCEDURE — G8419 CALC BMI OUT NRM PARAM NOF/U: HCPCS | Performed by: OBSTETRICS & GYNECOLOGY

## 2022-09-27 PROCEDURE — 96372 THER/PROPH/DIAG INJ SC/IM: CPT | Performed by: OBSTETRICS & GYNECOLOGY

## 2022-09-27 RX ORDER — MEDROXYPROGESTERONE ACETATE 150 MG/ML
150 INJECTION, SUSPENSION INTRAMUSCULAR ONCE
Status: COMPLETED | OUTPATIENT
Start: 2022-09-27 | End: 2022-09-27

## 2022-09-27 RX ORDER — FLUCONAZOLE 150 MG/1
TABLET ORAL
Qty: 3 TABLET | Refills: 0 | Status: SHIPPED | OUTPATIENT
Start: 2022-09-27

## 2022-09-27 RX ADMIN — MEDROXYPROGESTERONE ACETATE 150 MG: 150 INJECTION, SUSPENSION INTRAMUSCULAR at 12:23

## 2022-09-27 ASSESSMENT — PATIENT HEALTH QUESTIONNAIRE - PHQ9
SUM OF ALL RESPONSES TO PHQ QUESTIONS 1-9: 0
1. LITTLE INTEREST OR PLEASURE IN DOING THINGS: 0
SUM OF ALL RESPONSES TO PHQ QUESTIONS 1-9: 0
SUM OF ALL RESPONSES TO PHQ9 QUESTIONS 1 & 2: 0
2. FEELING DOWN, DEPRESSED OR HOPELESS: 0

## 2022-09-29 ENCOUNTER — TELEPHONE (OUTPATIENT)
Dept: OBGYN CLINIC | Age: 22
End: 2022-09-29

## 2022-09-29 NOTE — TELEPHONE ENCOUNTER
Aminata Hansen from [employer] called in to advise the patient is not eligable for another device till 2024 she was given one in April 2021 and it is a 3 year device.  Please advise

## 2022-10-05 NOTE — TELEPHONE ENCOUNTER
I called [employer], they suggested that the patient called her insurance to explain to them that she had it removed to have a baby she now has had her baby and would like a new one. The patient states she would like the device so she is going to contact her insurance. If for some reason the device will not be covered she would like to stay on the depo shot.

## 2022-10-15 ENCOUNTER — OFFICE VISIT (OUTPATIENT)
Dept: OBGYN CLINIC | Age: 22
End: 2022-10-15
Payer: COMMERCIAL

## 2022-10-15 VITALS
SYSTOLIC BLOOD PRESSURE: 108 MMHG | HEIGHT: 65 IN | BODY MASS INDEX: 26.66 KG/M2 | WEIGHT: 160 LBS | DIASTOLIC BLOOD PRESSURE: 70 MMHG

## 2022-10-15 DIAGNOSIS — R39.15 URINARY URGENCY: ICD-10-CM

## 2022-10-15 DIAGNOSIS — R35.0 URINARY FREQUENCY: ICD-10-CM

## 2022-10-15 DIAGNOSIS — R35.0 URINARY FREQUENCY: Primary | ICD-10-CM

## 2022-10-15 DIAGNOSIS — N89.8 VAGINAL DISCHARGE: ICD-10-CM

## 2022-10-15 LAB
BACTERIA: NEGATIVE /HPF
BILIRUBIN URINE: NEGATIVE
BLOOD, URINE: NEGATIVE
CLARITY: CLEAR
COLOR: YELLOW
EPITHELIAL CELLS, UA: NORMAL /HPF (ref 0–5)
GLUCOSE URINE: NEGATIVE MG/DL
HYALINE CASTS: NORMAL /HPF (ref 0–5)
KETONES, URINE: NEGATIVE MG/DL
LEUKOCYTE ESTERASE, URINE: ABNORMAL
NITRITE, URINE: NEGATIVE
PH UA: 6 (ref 5–9)
PROTEIN UA: NEGATIVE MG/DL
RBC UA: NORMAL /HPF (ref 0–5)
SPECIFIC GRAVITY UA: 1.02 (ref 1–1.03)
UROBILINOGEN, URINE: 0.2 E.U./DL
WBC UA: NORMAL /HPF (ref 0–5)

## 2022-10-15 PROCEDURE — G8484 FLU IMMUNIZE NO ADMIN: HCPCS | Performed by: ADVANCED PRACTICE MIDWIFE

## 2022-10-15 PROCEDURE — G8419 CALC BMI OUT NRM PARAM NOF/U: HCPCS | Performed by: ADVANCED PRACTICE MIDWIFE

## 2022-10-15 PROCEDURE — G8427 DOCREV CUR MEDS BY ELIG CLIN: HCPCS | Performed by: ADVANCED PRACTICE MIDWIFE

## 2022-10-15 PROCEDURE — 99214 OFFICE O/P EST MOD 30 MIN: CPT | Performed by: ADVANCED PRACTICE MIDWIFE

## 2022-10-15 PROCEDURE — 1036F TOBACCO NON-USER: CPT | Performed by: ADVANCED PRACTICE MIDWIFE

## 2022-10-15 RX ORDER — NITROFURANTOIN 25; 75 MG/1; MG/1
100 CAPSULE ORAL 2 TIMES DAILY
Qty: 10 CAPSULE | Refills: 0 | Status: SHIPPED | OUTPATIENT
Start: 2022-10-15 | End: 2022-10-20

## 2022-10-15 ASSESSMENT — ENCOUNTER SYMPTOMS
CONSTIPATION: 0
DIARRHEA: 0
VOMITING: 0
NAUSEA: 0
SHORTNESS OF BREATH: 0
COUGH: 0
ABDOMINAL PAIN: 0

## 2022-10-15 NOTE — PROGRESS NOTES
SUBJECTIVE:  Troy Bradley is a 23 y.o. female who presents here today for complaints of:      Chief Complaint   Patient presents with    Vaginal Discharge     Pt states she has been having some excessive vaginal discharge and irritation      Urinary Frequency, Urgency, Vaginal Discharge  Experiencing urinary frequency, urgency, and the sensation of increased vaginal moisture. She recently completed a course of Diflucan, but this did not improve her symptoms. Review of Systems   Respiratory:  Negative for cough and shortness of breath. Gastrointestinal:  Negative for abdominal pain, constipation, diarrhea, nausea and vomiting. Genitourinary:  Positive for frequency, urgency and vaginal discharge. Negative for difficulty urinating, dysuria, menstrual problem, pelvic pain and vaginal bleeding. All other systems reviewed and are negative. OBJECTIVE:  Vitals:  /70   Ht 5' 5\" (1.651 m)   Wt 160 lb (72.6 kg)   Breastfeeding No   BMI 26.63 kg/m²     Physical Exam  Appearance:  Normal appearance  Cardiovascular:  Normal rate, Capillary refill less than 2 seconds  Pulmonary:  Normal effort, no distress  Abdominal:  No tenderness  MS:  No Swelling, No dependent edema  Skin:  Warm, dry  Neuro:  Alert and oriented x3, reflexes normal.  Psychiatric:  Normal mood and behavior    ASSESSMENT & PLAN:   Diagnosis Orders   1. Urinary frequency  Culture, Urine    Urinalysis    nitrofurantoin, macrocrystal-monohydrate, (MACROBID) 100 MG capsule      2. Urinary urgency  Culture, Urine    Urinalysis    nitrofurantoin, macrocrystal-monohydrate, (MACROBID) 100 MG capsule      3. Vaginal discharge  C.trachomatis N.gonorrhoeae DNA, Urine    Wet prep, genital          Urinary Frequency, Urgency  Urine collected for UA with culture  Rx for Macrobid    Vaginal Discharge  Culture collected, treat if indicated    Return if symptoms worsen or fail to improve.     GENNARO Sandoval - CARIE

## 2022-10-16 LAB
CLUE CELLS: NORMAL
TRICHOMONAS PREP: NORMAL
TRICHOMONAS VAGINALIS SCREEN: NEGATIVE
URINE CULTURE, ROUTINE: NORMAL
YEAST WET PREP: NORMAL

## 2022-10-19 ENCOUNTER — OFFICE VISIT (OUTPATIENT)
Dept: OBGYN CLINIC | Age: 22
End: 2022-10-19
Payer: COMMERCIAL

## 2022-10-19 VITALS
WEIGHT: 161 LBS | SYSTOLIC BLOOD PRESSURE: 124 MMHG | DIASTOLIC BLOOD PRESSURE: 80 MMHG | BODY MASS INDEX: 26.82 KG/M2 | HEIGHT: 65 IN

## 2022-10-19 LAB
C. TRACHOMATIS DNA ,URINE: NEGATIVE
N. GONORRHOEAE DNA, URINE: NEGATIVE

## 2022-10-19 PROCEDURE — 1036F TOBACCO NON-USER: CPT | Performed by: OBSTETRICS & GYNECOLOGY

## 2022-10-19 PROCEDURE — G8484 FLU IMMUNIZE NO ADMIN: HCPCS | Performed by: OBSTETRICS & GYNECOLOGY

## 2022-10-19 PROCEDURE — G8419 CALC BMI OUT NRM PARAM NOF/U: HCPCS | Performed by: OBSTETRICS & GYNECOLOGY

## 2022-10-19 PROCEDURE — 99213 OFFICE O/P EST LOW 20 MIN: CPT | Performed by: OBSTETRICS & GYNECOLOGY

## 2022-10-19 PROCEDURE — G8428 CUR MEDS NOT DOCUMENT: HCPCS | Performed by: OBSTETRICS & GYNECOLOGY

## 2022-10-19 RX ORDER — VENLAFAXINE HYDROCHLORIDE 37.5 MG/1
37.5 CAPSULE, EXTENDED RELEASE ORAL DAILY
Qty: 30 CAPSULE | Refills: 3 | Status: SHIPPED | OUTPATIENT
Start: 2022-10-19

## 2022-10-19 ASSESSMENT — ENCOUNTER SYMPTOMS
APNEA: 0
SHORTNESS OF BREATH: 0
ABDOMINAL PAIN: 0

## 2022-10-19 NOTE — PROGRESS NOTES
Subjective:      Patient ID:  Chema Vasques is a 23 y.o. female with chief complaint of:  Chief Complaint   Patient presents with    Depression     Pt feels like she has reached her breaking point, feeling very emotional, down and depressed. Pt admits that she is resorting to drinking when she is alone and is worried about developing a habit        Patient is concerned bc of recent development of worsening anxiety and depression. She is teary daily and has acute bouts where she has to lock self in bathroom to cry. Denies any thoughts of harming self or children but has thoughts of it being better if she didn't wake but never thinks of doing anything. She has good support with family      Past Medical History:   Diagnosis Date    Herpes simplex virus (HSV) infection      No past surgical history on file. Family History   Problem Relation Age of Onset    Cancer Sister         leukemia    Heart Disease Maternal Uncle      Current Outpatient Medications on File Prior to Visit   Medication Sig Dispense Refill    nitrofurantoin, macrocrystal-monohydrate, (MACROBID) 100 MG capsule Take 1 capsule by mouth 2 times daily for 5 days 10 capsule 0    fluconazole (DIFLUCAN) 150 MG tablet Take 1 tablet every 2 days for 3 doses. (Patient not taking: Reported on 10/15/2022) 3 tablet 0    ibuprofen (ADVIL;MOTRIN) 600 MG tablet Take 1 tablet by mouth every 6 hours as needed for Pain 120 tablet 3    Wcxrlj-RwPjl-LdHpw-Meth-FA-DHA (PRENATE MINI) 18-0.6-0.4-350 MG CAPS Take 1 tablet by mouth daily (Patient not taking: Reported on 10/15/2022) 30 capsule 5    valACYclovir (VALTREX) 500 MG tablet Take 1 tablet by mouth daily 30 tablet 3     No current facility-administered medications on file prior to visit. Allergies:  Pcn [penicillins]    Review of Systems   Constitutional:  Negative for fatigue and fever. Respiratory:  Negative for apnea and shortness of breath.     Cardiovascular:  Negative for chest pain and palpitations. Gastrointestinal:  Negative for abdominal pain. Genitourinary:  Negative for difficulty urinating, dysuria, pelvic pain, vaginal bleeding and vaginal discharge. Neurological:  Negative for dizziness, weakness and light-headedness. Psychiatric/Behavioral:  Positive for decreased concentration and dysphoric mood. Negative for agitation, sleep disturbance and suicidal ideas. The patient is nervous/anxious. Objective:   /80   Ht 5' 5\" (1.651 m)   Wt 161 lb (73 kg)   Breastfeeding No   BMI 26.79 kg/m²      Physical Exam  Constitutional:       Appearance: Normal appearance. Cardiovascular:      Rate and Rhythm: Normal rate and regular rhythm. Pulmonary:      Effort: Pulmonary effort is normal.      Breath sounds: Normal breath sounds. Neurological:      Mental Status: She is alert. Psychiatric:         Attention and Perception: Attention normal.         Mood and Affect: Mood is anxious and depressed. Affect is tearful. Speech: Speech normal.         Behavior: Behavior normal. Behavior is not aggressive or withdrawn. Behavior is cooperative. Thought Content: Thought content is not paranoid or delusional. Thought content does not include homicidal or suicidal ideation. Thought content does not include homicidal or suicidal plan. Cognition and Memory: Cognition normal.       Assessment:       Diagnosis Orders   1.  Post-partum depression  Papo Jacobson MD, [facility]            Plan:      Orders Placed This Encounter   Procedures   Papo Jacobson MD, [facility]     Referral Priority:   Urgent     Referral Type:   Eval and Treat     Referral Reason:   Specialty Services Required     Referred to Provider:   Bernie Leos MD     Requested Specialty:   Psychiatry     Number of Visits Requested:   1     Orders Placed This Encounter   Medications    venlafaxine (EFFEXOR XR) 37.5 MG extended release capsule     Sig: Take 1 capsule by mouth daily     Dispense:  30 capsule     Refill:  3     Spent 25 min with gurinder discussing current support and needs will refer to psychologist and start on meds today. She is   Return if symptoms worsen or fail to improve.      Carlos Mota, DO

## 2022-12-14 RX ORDER — VALACYCLOVIR HYDROCHLORIDE 500 MG/1
500 TABLET, FILM COATED ORAL DAILY
Qty: 30 TABLET | Refills: 3 | Status: SHIPPED | OUTPATIENT
Start: 2022-12-14

## 2022-12-14 NOTE — TELEPHONE ENCOUNTER
Requested Prescriptions     Pending Prescriptions Disp Refills    valACYclovir (VALTREX) 500 MG tablet 30 tablet 3     Sig: Take 1 tablet by mouth daily

## 2023-02-19 PROBLEM — M54.9 PAIN IN BACK: Status: ACTIVE | Noted: 2023-02-19

## 2023-02-19 PROBLEM — B00.1 COLD SORE: Status: ACTIVE | Noted: 2023-02-19

## 2023-02-19 PROBLEM — S93.409A ANKLE SPRAIN: Status: ACTIVE | Noted: 2023-02-19

## 2023-02-19 PROBLEM — R53.83 FATIGUE: Status: ACTIVE | Noted: 2023-02-19

## 2023-02-19 PROBLEM — F41.9 ANXIETY DISORDER: Status: ACTIVE | Noted: 2023-02-19

## 2023-02-19 PROBLEM — F32.A DEPRESSION: Status: ACTIVE | Noted: 2023-02-19

## 2023-02-19 RX ORDER — ACETAMINOPHEN 325 MG/1
TABLET ORAL
COMMUNITY

## 2023-02-19 RX ORDER — BUPROPION HYDROCHLORIDE 150 MG/1
150 TABLET ORAL 2 TIMES DAILY
COMMUNITY
End: 2023-08-23 | Stop reason: ALTCHOICE

## 2023-02-19 RX ORDER — IBUPROFEN 600 MG/1
TABLET ORAL EVERY 6 HOURS PRN
COMMUNITY
Start: 2022-08-24

## 2023-04-19 ENCOUNTER — CLINICAL SUPPORT (OUTPATIENT)
Dept: PRIMARY CARE | Facility: CLINIC | Age: 23
End: 2023-04-19
Payer: COMMERCIAL

## 2023-04-19 DIAGNOSIS — Z30.013 ENCOUNTER FOR INITIAL PRESCRIPTION OF INJECTABLE CONTRACEPTIVE: ICD-10-CM

## 2023-04-19 PROCEDURE — 96372 THER/PROPH/DIAG INJ SC/IM: CPT | Performed by: FAMILY MEDICINE

## 2023-04-19 RX ORDER — MEDROXYPROGESTERONE ACETATE 150 MG/ML
150 INJECTION, SUSPENSION INTRAMUSCULAR ONCE
Status: COMPLETED | OUTPATIENT
Start: 2023-04-19 | End: 2023-04-19

## 2023-04-19 RX ADMIN — MEDROXYPROGESTERONE ACETATE 150 MG: 150 INJECTION, SUSPENSION INTRAMUSCULAR at 13:07

## 2023-04-19 NOTE — PROGRESS NOTES
Patient present for depo shot. Last administered 02/01. Advised patient next injection will be due 07/05-07/19. Administered in left arm with no complications.   Pt provided with discharge instructions. Pt had no further questions. Pt ambulated to lobby.

## 2023-05-23 ENCOUNTER — OFFICE VISIT (OUTPATIENT)
Dept: OBGYN CLINIC | Age: 23
End: 2023-05-23
Payer: COMMERCIAL

## 2023-05-23 VITALS
DIASTOLIC BLOOD PRESSURE: 72 MMHG | WEIGHT: 182 LBS | SYSTOLIC BLOOD PRESSURE: 118 MMHG | BODY MASS INDEX: 30.32 KG/M2 | HEIGHT: 65 IN

## 2023-05-23 DIAGNOSIS — N89.8 VAGINAL DISCHARGE: ICD-10-CM

## 2023-05-23 DIAGNOSIS — R30.0 DYSURIA: ICD-10-CM

## 2023-05-23 DIAGNOSIS — N89.8 VAGINAL DISCHARGE: Primary | ICD-10-CM

## 2023-05-23 PROCEDURE — G8427 DOCREV CUR MEDS BY ELIG CLIN: HCPCS | Performed by: OBSTETRICS & GYNECOLOGY

## 2023-05-23 PROCEDURE — 99213 OFFICE O/P EST LOW 20 MIN: CPT | Performed by: OBSTETRICS & GYNECOLOGY

## 2023-05-23 PROCEDURE — G8417 CALC BMI ABV UP PARAM F/U: HCPCS | Performed by: OBSTETRICS & GYNECOLOGY

## 2023-05-23 PROCEDURE — 1036F TOBACCO NON-USER: CPT | Performed by: OBSTETRICS & GYNECOLOGY

## 2023-05-23 RX ORDER — NAPROXEN 500 MG/1
TABLET ORAL
COMMUNITY
Start: 2023-04-12

## 2023-05-23 RX ORDER — SULFAMETHOXAZOLE AND TRIMETHOPRIM 800; 160 MG/1; MG/1
TABLET ORAL
COMMUNITY
Start: 2023-03-23

## 2023-05-23 RX ORDER — BUPROPION HYDROCHLORIDE 150 MG/1
150 TABLET ORAL 2 TIMES DAILY
COMMUNITY

## 2023-05-23 ASSESSMENT — ENCOUNTER SYMPTOMS
SHORTNESS OF BREATH: 0
ABDOMINAL PAIN: 0
APNEA: 0

## 2023-05-23 NOTE — PROGRESS NOTES
Subjective:      Patient ID:  Clarice Cosme is a 23 y.o. female with chief complaint of:  Chief Complaint   Patient presents with    Other     Pt has been treated for two UTIs, she also has been having some yellow discharge        Patient presents today with concerns about what feels like a recurrent UTI however she has been on antibiotics and her pain and discomfort had resolved in the past but this time is still states persistent. Review of records does not show a vaginal culture or urine culture only urine analysis. Patient is sexually active she does have a previous history of chlamydia      Past Medical History:   Diagnosis Date    Herpes simplex virus (HSV) infection      No past surgical history on file. Family History   Problem Relation Age of Onset    Cancer Sister         leukemia    Heart Disease Maternal Uncle      Current Outpatient Medications on File Prior to Visit   Medication Sig Dispense Refill    sulfamethoxazole-trimethoprim (BACTRIM DS;SEPTRA DS) 800-160 MG per tablet take 1 tablet by mouth twice a day for 10 days take with food and PLENTY OF WATER      naproxen (NAPROSYN) 500 MG tablet take 1 tablet by mouth twice a day if needed for pain with food      buPROPion (WELLBUTRIN XL) 150 MG extended release tablet Take 1 tablet by mouth 2 times daily      valACYclovir (VALTREX) 500 MG tablet Take 1 tablet by mouth daily 30 tablet 3    fluconazole (DIFLUCAN) 150 MG tablet Take 1 tablet every 2 days for 3 doses. (Patient not taking: Reported on 10/15/2022) 3 tablet 0    ibuprofen (ADVIL;MOTRIN) 600 MG tablet Take 1 tablet by mouth every 6 hours as needed for Pain (Patient not taking: Reported on 5/23/2023) 120 tablet 3    Gvauam-MjItp-LoYcu-Meth-FA-DHA (PRENATE MINI) 18-0.6-0.4-350 MG CAPS Take 1 tablet by mouth daily (Patient not taking: Reported on 10/15/2022) 30 capsule 5     No current facility-administered medications on file prior to visit.      Allergies:  Pcn [penicillins]    Review of

## 2023-05-24 LAB
CLUE CELLS VAG QL WET PREP: NORMAL
T VAGINALIS VAG QL WET PREP: NORMAL
TRICHOMONAS VAGINALIS SCREEN: NEGATIVE
YEAST VAG QL WET PREP: NORMAL

## 2023-05-25 LAB
C TRACH DNA CVX QL NAA+PROBE: NEGATIVE
N GONORRHOEA DNA CERV MUCUS QL NAA+PROBE: NEGATIVE

## 2023-07-10 ENCOUNTER — TELEPHONE (OUTPATIENT)
Dept: OBGYN CLINIC | Age: 23
End: 2023-07-10

## 2023-07-12 NOTE — TELEPHONE ENCOUNTER
Spoke to patient pt states she spoke to [facility] who told her that the device was approved and she has it scheduled to be delivered to our office today.

## 2023-07-20 ENCOUNTER — TELEPHONE (OUTPATIENT)
Dept: OBGYN CLINIC | Age: 23
End: 2023-07-20

## 2023-07-20 DIAGNOSIS — Z30.014 ENCOUNTER FOR INITIAL PRESCRIPTION OF INTRAUTERINE CONTRACEPTIVE DEVICE (IUD): Primary | ICD-10-CM

## 2023-07-20 RX ORDER — MISOPROSTOL 200 UG/1
TABLET ORAL
Qty: 4 TABLET | Refills: 0 | Status: SHIPPED | OUTPATIENT
Start: 2023-07-20

## 2023-07-20 RX ORDER — IBUPROFEN 800 MG/1
TABLET ORAL
Qty: 15 TABLET | Refills: 0 | Status: SHIPPED | OUTPATIENT
Start: 2023-07-20

## 2023-07-20 NOTE — TELEPHONE ENCOUNTER
Received call from patient requesting a script from provider that can help iud insertion process be more comfortable. Patient is scheduled for tomorrow July 21,2023.

## 2023-07-21 ENCOUNTER — PROCEDURE VISIT (OUTPATIENT)
Dept: OBGYN CLINIC | Age: 23
End: 2023-07-21

## 2023-07-21 VITALS
DIASTOLIC BLOOD PRESSURE: 66 MMHG | SYSTOLIC BLOOD PRESSURE: 120 MMHG | HEART RATE: 96 BPM | WEIGHT: 183 LBS | BODY MASS INDEX: 30.45 KG/M2

## 2023-07-21 DIAGNOSIS — Z30.430 ENCOUNTER FOR IUD INSERTION: ICD-10-CM

## 2023-07-21 DIAGNOSIS — Z32.02 URINE PREGNANCY TEST NEGATIVE: ICD-10-CM

## 2023-07-21 DIAGNOSIS — N94.6 DYSMENORRHEA: Primary | ICD-10-CM

## 2023-07-21 LAB
HCG, URINE, POC: NEGATIVE
Lab: NORMAL
NEGATIVE QC PASS/FAIL: NORMAL
POSITIVE QC PASS/FAIL: NORMAL

## 2023-07-21 RX ORDER — LEVONORGESTREL 52 MG/1
INTRAUTERINE DEVICE INTRAUTERINE
COMMUNITY
Start: 2023-07-05

## 2023-07-21 NOTE — PROGRESS NOTES
IUD Insertion Procedure Note    Pre-operative Diagnosis:  Dysmeorrhea    Post-operative Diagnosis:  Same    Indications:  Same    Procedure Details:   Urine pregnancy test was done and result was negative. The risks (including infection, bleeding, pain, and uterine perforation) and benefits of the procedure were explained to the patient and Written informed consent was obtained. A Time Out was performed with patient participation prior to beginning the procedure. Cervix cleansed with Betadine. Uterus sounded to 8 cm. Mirena IUD inserted without difficulty. Strings visible and trimmed. Patient tolerated procedure well. IUD Information:    Type:  Mirena  LOT Number:  Maureen Uriarte Date:  05/30/2025  NDC:  62991-706-41    Condition:  Stable    Complications:  None    Plan:  The patient was advised to call for any fever or for prolonged or severe pain or bleeding. She was advised to use OTC Ibuprofen or Tylenol as needed for mild to moderate pain. Follow up:  Return in about 4 weeks (around 8/18/2023) for IUD Follow-up, (virtual is fine).     Trista Cancer, APRN - CNM

## 2023-08-23 ENCOUNTER — OFFICE VISIT (OUTPATIENT)
Dept: PRIMARY CARE | Facility: CLINIC | Age: 23
End: 2023-08-23
Payer: COMMERCIAL

## 2023-08-23 VITALS
OXYGEN SATURATION: 97 % | HEIGHT: 64 IN | SYSTOLIC BLOOD PRESSURE: 106 MMHG | RESPIRATION RATE: 16 BRPM | BODY MASS INDEX: 31.21 KG/M2 | DIASTOLIC BLOOD PRESSURE: 72 MMHG | WEIGHT: 182.8 LBS | HEART RATE: 104 BPM

## 2023-08-23 DIAGNOSIS — Z00.00 ANNUAL PHYSICAL EXAM: Primary | ICD-10-CM

## 2023-08-23 DIAGNOSIS — F41.1 GENERALIZED ANXIETY DISORDER: ICD-10-CM

## 2023-08-23 PROCEDURE — 99395 PREV VISIT EST AGE 18-39: CPT | Performed by: FAMILY MEDICINE

## 2023-08-23 RX ORDER — LEVONORGESTREL 52 MG/1
1 INTRAUTERINE DEVICE INTRAUTERINE ONCE
COMMUNITY
Start: 2023-07-05

## 2023-08-23 ASSESSMENT — ENCOUNTER SYMPTOMS
SHORTNESS OF BREATH: 0
SPEECH DIFFICULTY: 0
POLYDIPSIA: 0
DYSURIA: 0
APPETITE CHANGE: 0
HEMATURIA: 0
HEADACHES: 0
NERVOUS/ANXIOUS: 0
MYALGIAS: 0
COUGH: 0
CHEST TIGHTNESS: 0
ABDOMINAL PAIN: 0
FEVER: 0
NECK STIFFNESS: 0
STRIDOR: 0
RHINORRHEA: 0
CONFUSION: 0
BLOOD IN STOOL: 0
ADENOPATHY: 0
ABDOMINAL DISTENTION: 0
DIARRHEA: 0
COLOR CHANGE: 0
ARTHRALGIAS: 0
EYE PAIN: 0
SLEEP DISTURBANCE: 0
DYSPHORIC MOOD: 0
SORE THROAT: 0
SEIZURES: 0
CONSTIPATION: 0
SINUS PAIN: 0
SINUS PRESSURE: 0
CONSTITUTIONAL NEGATIVE: 1
RECTAL PAIN: 0
PHOTOPHOBIA: 0
AGITATION: 0
PALPITATIONS: 0
FLANK PAIN: 0
DIZZINESS: 0
TROUBLE SWALLOWING: 0
POLYPHAGIA: 0
DECREASED CONCENTRATION: 0
FATIGUE: 0
ACTIVITY CHANGE: 0

## 2023-08-23 NOTE — PATIENT INSTRUCTIONS
Follow up in 3 WEEKS    Continue current medications and therapy for chronic medical conditions.    Patient was advised importance of proper diet/nutrition in addition adequate hydration. Patient was encouraged moderate exercise program to include 30 minutes daily for 5 days of the week or 150 minutes weekly. Patient will follow-up with us as scheduled.    SCHEDULE MANTOUX TESTING    COMPLETE SCHOOL ADMISSION FORMS

## 2023-08-23 NOTE — PROGRESS NOTES
"Subjective   Patient ID: Jocelin Mckee is a 22 y.o. female who presents for Annual Exam.    HPI     Patient is here for annual exam for her J [facility] program starts at end of September 2023. She will need a 2 step Mantoux once the Tuberculin is available.   Patient has stopped medications from the past but is on a Mirena.     Review of Systems   Constitutional: Negative.  Negative for activity change, appetite change, fatigue and fever.   HENT:  Negative for congestion, dental problem, ear discharge, ear pain, mouth sores, rhinorrhea, sinus pressure, sinus pain, sore throat, tinnitus and trouble swallowing.    Eyes:  Negative for photophobia, pain and visual disturbance.   Respiratory:  Negative for cough, chest tightness, shortness of breath and stridor.    Cardiovascular:  Negative for chest pain and palpitations.   Gastrointestinal:  Negative for abdominal distention, abdominal pain, blood in stool, constipation, diarrhea and rectal pain.   Endocrine: Negative for cold intolerance, heat intolerance, polydipsia, polyphagia and polyuria.   Genitourinary:  Negative for dysuria, flank pain, hematuria and urgency.   Musculoskeletal:  Negative for arthralgias, gait problem, myalgias and neck stiffness.   Skin:  Negative for color change and rash.   Allergic/Immunologic: Negative for environmental allergies and food allergies.   Neurological:  Negative for dizziness, seizures, syncope, speech difficulty and headaches.   Hematological:  Negative for adenopathy.   Psychiatric/Behavioral:  Negative for agitation, confusion, decreased concentration, dysphoric mood and sleep disturbance. The patient is not nervous/anxious.        Objective   /72 (BP Location: Right arm, Patient Position: Sitting, BP Cuff Size: Adult)   Pulse 104   Resp 16   Ht 1.626 m (5' 4\")   Wt 82.9 kg (182 lb 12.8 oz)   SpO2 97%   BMI 31.38 kg/m²     Physical Exam  Vitals reviewed.   Constitutional:       General: She is not in acute " distress.     Appearance: She is obese. She is not ill-appearing or diaphoretic.   HENT:      Head: Normocephalic.      Right Ear: Tympanic membrane and external ear normal.      Left Ear: Tympanic membrane and external ear normal.      Nose: Nose normal. No congestion.      Mouth/Throat:      Pharynx: No posterior oropharyngeal erythema.   Eyes:      General:         Right eye: No discharge.         Left eye: No discharge.      Extraocular Movements: Extraocular movements intact.      Conjunctiva/sclera: Conjunctivae normal.      Pupils: Pupils are equal, round, and reactive to light.   Cardiovascular:      Rate and Rhythm: Normal rate and regular rhythm.      Pulses: Normal pulses.      Heart sounds: Normal heart sounds. No murmur heard.  Pulmonary:      Effort: Pulmonary effort is normal. No respiratory distress.      Breath sounds: Normal breath sounds. No wheezing or rales.   Chest:      Chest wall: No tenderness.   Abdominal:      General: Bowel sounds are normal. There is distension.      Palpations: There is no mass.      Tenderness: There is no abdominal tenderness. There is no guarding.   Musculoskeletal:         General: No tenderness. Normal range of motion.      Cervical back: Normal range of motion and neck supple. No tenderness.      Right lower leg: No edema.      Left lower leg: No edema.   Skin:     General: Skin is warm and dry.      Coloration: Skin is not jaundiced.      Findings: No bruising or erythema.   Neurological:      General: No focal deficit present.      Mental Status: She is alert and oriented to person, place, and time. Mental status is at baseline.      Cranial Nerves: No cranial nerve deficit.      Sensory: No sensory deficit.      Coordination: Coordination normal.      Gait: Gait normal.   Psychiatric:         Mood and Affect: Mood normal.         Thought Content: Thought content normal.         Judgment: Judgment normal.         Assessment/Plan   Problem List Items Addressed  This Visit       Anxiety disorder     Other Visit Diagnoses       Annual physical exam    -  Primary              Scribe Attestation  By signing my name below, I, TAMMI Morton , Scribe   attest that this documentation has been prepared under the direction and in the presence of Yakov Leger DO.   Provider Attestation - Scribe documentation    All medical record entries made by the Scribe were at my direction and personally dictated by me. I have reviewed the chart and agree that the record accurately reflects my personal performance of the history, physical exam, discussion and plan.

## 2023-08-28 ENCOUNTER — CLINICAL SUPPORT (OUTPATIENT)
Dept: PRIMARY CARE | Facility: CLINIC | Age: 23
End: 2023-08-28
Payer: COMMERCIAL

## 2023-08-28 DIAGNOSIS — A15.9 TB (TUBERCULOSIS): ICD-10-CM

## 2023-08-28 PROCEDURE — 86580 TB INTRADERMAL TEST: CPT | Performed by: FAMILY MEDICINE

## 2023-08-30 ENCOUNTER — CLINICAL SUPPORT (OUTPATIENT)
Dept: PRIMARY CARE | Facility: CLINIC | Age: 23
End: 2023-08-30
Payer: COMMERCIAL

## 2023-08-30 DIAGNOSIS — Z11.1 VISIT FOR MANTOUX TEST: ICD-10-CM

## 2023-08-30 NOTE — LETTER
August 30, 2023    Patient: Jocelin Mckee   YOB: 2000   Date of Visit: 8/30/2023       To Whom It May Concern:    Our clinic recently performed a tuberculosis skin test on one  Ms. Mckee. The results of this test are as follows:    This test was negative for tuberculosis exposure per current Centers for Disease Control guidelines.    A chest x-ray was not required.    If you have any questions or concerns, please don't hesitate to call.         Sincerely,        TAMMI Ashby

## 2023-08-30 NOTE — PROGRESS NOTES
TB Read    Patient presents in office for a 2 step tb read. Patient tb was performed on 08/28/23 on right forearm. Patient tb result is negative.

## 2023-09-07 ENCOUNTER — CLINICAL SUPPORT (OUTPATIENT)
Dept: PRIMARY CARE | Facility: CLINIC | Age: 23
End: 2023-09-07
Payer: COMMERCIAL

## 2023-09-07 DIAGNOSIS — Z11.1 VISIT FOR MANTOUX TEST: ICD-10-CM

## 2023-09-07 PROCEDURE — 86580 TB INTRADERMAL TEST: CPT | Performed by: FAMILY MEDICINE

## 2023-09-07 PROCEDURE — 99211 OFF/OP EST MAY X REQ PHY/QHP: CPT | Performed by: FAMILY MEDICINE

## 2023-09-07 NOTE — PROGRESS NOTES
Subjective   Patient ID: Jocelin Mckee is a 22 y.o. female who presents for PPD Placement.    HPI patient is here for a 2nd tb placement. She will be due to have test read on Saturday 9/9/23. Tb was inserted in the left forearm.

## 2023-09-09 ENCOUNTER — HOSPITAL ENCOUNTER (EMERGENCY)
Age: 23
Discharge: HOME | End: 2023-09-09
Attending: STUDENT IN AN ORGANIZED HEALTH CARE EDUCATION/TRAINING PROGRAM
Payer: OTHER MISCELLANEOUS

## 2023-09-09 ENCOUNTER — APPOINTMENT (OUTPATIENT)
Dept: MRI IMAGING | Age: 23
End: 2023-09-09
Payer: OTHER MISCELLANEOUS

## 2023-09-09 ENCOUNTER — APPOINTMENT (OUTPATIENT)
Dept: CT IMAGING | Age: 23
End: 2023-09-09
Payer: OTHER MISCELLANEOUS

## 2023-09-09 VITALS
OXYGEN SATURATION: 100 % | SYSTOLIC BLOOD PRESSURE: 104 MMHG | BODY MASS INDEX: 30.45 KG/M2 | RESPIRATION RATE: 18 BRPM | DIASTOLIC BLOOD PRESSURE: 87 MMHG | TEMPERATURE: 99 F | HEART RATE: 76 BPM | HEIGHT: 65 IN
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
## 2023-09-09 DIAGNOSIS — F10.929 ACUTE ALCOHOLIC INTOXICATION WITH COMPLICATION (HCC): ICD-10-CM

## 2023-09-09 DIAGNOSIS — S09.90XA CLOSED HEAD INJURY, INITIAL ENCOUNTER: ICD-10-CM

## 2023-09-09 LAB
ALBUMIN SERPL-MCNC: 5 G/DL (ref 3.5–4.6)
ALP SERPL-CCNC: 102 U/L (ref 40–130)
ALT SERPL-CCNC: 27 U/L (ref 0–33)
AMPHET UR QL SCN: NORMAL
ANION GAP SERPL CALCULATED.3IONS-SCNC: 15 MEQ/L (ref 9–15)
APTT PPP: 29.4 SEC (ref 24.4–36.8)
AST SERPL-CCNC: 24 U/L (ref 0–35)
BACTERIA URNS QL MICRO: NEGATIVE /HPF
BARBITURATES UR QL SCN: NORMAL
BASOPHILS # BLD: 0 K/UL (ref 0–0.2)
BASOPHILS NFR BLD: 0.3 %
BENZODIAZ UR QL SCN: NORMAL
BILIRUB SERPL-MCNC: <0.2 MG/DL (ref 0.2–0.7)
BILIRUB UR QL STRIP: NEGATIVE
BUN SERPL-MCNC: 5 MG/DL (ref 6–20)
CALCIUM SERPL-MCNC: 9.1 MG/DL (ref 8.5–9.9)
CANNABINOIDS UR QL SCN: NORMAL
CHLORIDE SERPL-SCNC: 108 MEQ/L (ref 95–107)
CK SERPL-CCNC: 111 U/L (ref 0–170)
CLARITY UR: CLEAR
CO2 SERPL-SCNC: 22 MEQ/L (ref 20–31)
COCAINE UR QL SCN: NORMAL
COLOR UR: YELLOW
CREAT SERPL-MCNC: 0.82 MG/DL (ref 0.5–0.9)
DRUG SCREEN COMMENT UR-IMP: NORMAL
EKG ATRIAL RATE: 103 BPM
EKG P AXIS: 110 DEGREES
EKG P-R INTERVAL: 136 MS
EKG Q-T INTERVAL: 342 MS
EKG QRS DURATION: 90 MS
EKG QTC CALCULATION (BAZETT): 448 MS
EKG R AXIS: 126 DEGREES
EKG T AXIS: 150 DEGREES
EKG VENTRICULAR RATE: 103 BPM
EOSINOPHIL # BLD: 0 K/UL (ref 0–0.7)
EOSINOPHIL NFR BLD: 0.4 %
EPI CELLS #/AREA URNS AUTO: ABNORMAL /HPF (ref 0–5)
ERYTHROCYTE [DISTWIDTH] IN BLOOD BY AUTOMATED COUNT: 13.5 % (ref 11.5–14.5)
ETHANOL PERCENT: 0.19 G/DL
ETHANOLAMINE SERPL-MCNC: 214 MG/DL (ref 0–0.08)
FENTANYL SCREEN, URINE: NORMAL
GLOBULIN SER CALC-MCNC: 2.4 G/DL (ref 2.3–3.5)
GLUCOSE SERPL-MCNC: 108 MG/DL (ref 70–99)
GLUCOSE UR STRIP-MCNC: NEGATIVE MG/DL
HCT VFR BLD AUTO: 43.1 % (ref 37–47)
HGB BLD-MCNC: 14.7 G/DL (ref 12–16)
HGB UR QL STRIP: ABNORMAL
HYALINE CASTS #/AREA URNS AUTO: ABNORMAL /HPF (ref 0–5)
INR PPP: 1.1
KETONES UR STRIP-MCNC: NEGATIVE MG/DL
LACTATE BLDV-SCNC: 2.1 MMOL/L (ref 0.5–2.2)
LEUKOCYTE ESTERASE UR QL STRIP: ABNORMAL
LYMPHOCYTES # BLD: 2.3 K/UL (ref 1–4.8)
LYMPHOCYTES NFR BLD: 25.7 %
MAGNESIUM SERPL-MCNC: 2.1 MG/DL (ref 1.7–2.4)
MCH RBC QN AUTO: 30.6 PG (ref 27–31.3)
MCHC RBC AUTO-ENTMCNC: 34.2 % (ref 33–37)
MCV RBC AUTO: 89.7 FL (ref 79.4–94.8)
METHADONE UR QL SCN: NORMAL
MONOCYTES # BLD: 0.4 K/UL (ref 0.2–0.8)
MONOCYTES NFR BLD: 4.1 %
NEUTROPHILS # BLD: 6.3 K/UL (ref 1.4–6.5)
NEUTS SEG NFR BLD: 69.5 %
NITRITE UR QL STRIP: NEGATIVE
OPIATES UR QL SCN: NORMAL
OXYCODONE UR QL SCN: NORMAL
PCP UR QL SCN: NORMAL
PH UR STRIP: 7 [PH] (ref 5–9)
PLATELET # BLD AUTO: 292 K/UL (ref 130–400)
POTASSIUM SERPL-SCNC: 3.5 MEQ/L (ref 3.4–4.9)
PROPOXYPH UR QL SCN: NORMAL
PROT SERPL-MCNC: 7.4 G/DL (ref 6.3–8)
PROT UR STRIP-MCNC: NEGATIVE MG/DL
PROTHROMBIN TIME: 13.8 SEC (ref 12.3–14.9)
RBC # BLD AUTO: 4.81 M/UL (ref 4.2–5.4)
RBC #/AREA URNS AUTO: ABNORMAL /HPF (ref 0–5)
SODIUM SERPL-SCNC: 145 MEQ/L (ref 135–144)
SP GR UR STRIP: 1 (ref 1–1.03)
TROPONIN T SERPL-MCNC: <0.01 NG/ML (ref 0–0.01)
URINE REFLEX TO CULTURE: ABNORMAL
UROBILINOGEN UR STRIP-ACNC: 0.2 E.U./DL
WBC # BLD AUTO: 9 K/UL (ref 4.8–10.8)
WBC #/AREA URNS AUTO: ABNORMAL /HPF (ref 0–5)

## 2023-09-09 PROCEDURE — 6360000002 HC RX W HCPCS: Performed by: STUDENT IN AN ORGANIZED HEALTH CARE EDUCATION/TRAINING PROGRAM

## 2023-09-09 PROCEDURE — 82077 ASSAY SPEC XCP UR&BREATH IA: CPT

## 2023-09-09 PROCEDURE — 96375 TX/PRO/DX INJ NEW DRUG ADDON: CPT | Performed by: EMERGENCY MEDICINE

## 2023-09-09 PROCEDURE — 6360000004 HC RX CONTRAST MEDICATION: Performed by: STUDENT IN AN ORGANIZED HEALTH CARE EDUCATION/TRAINING PROGRAM

## 2023-09-09 PROCEDURE — 82550 ASSAY OF CK (CPK): CPT

## 2023-09-09 PROCEDURE — 83605 ASSAY OF LACTIC ACID: CPT

## 2023-09-09 PROCEDURE — 72125 CT NECK SPINE W/O DYE: CPT

## 2023-09-09 PROCEDURE — 36415 COLL VENOUS BLD VENIPUNCTURE: CPT

## 2023-09-09 PROCEDURE — 72128 CT CHEST SPINE W/O DYE: CPT

## 2023-09-09 PROCEDURE — 84484 ASSAY OF TROPONIN QUANT: CPT

## 2023-09-09 PROCEDURE — 81001 URINALYSIS AUTO W/SCOPE: CPT

## 2023-09-09 PROCEDURE — 74177 CT ABD & PELVIS W/CONTRAST: CPT

## 2023-09-09 PROCEDURE — 72146 MRI CHEST SPINE W/O DYE: CPT

## 2023-09-09 PROCEDURE — 85610 PROTHROMBIN TIME: CPT

## 2023-09-09 PROCEDURE — 71260 CT THORAX DX C+: CPT

## 2023-09-09 PROCEDURE — 96361 HYDRATE IV INFUSION ADD-ON: CPT | Performed by: EMERGENCY MEDICINE

## 2023-09-09 PROCEDURE — 72141 MRI NECK SPINE W/O DYE: CPT

## 2023-09-09 PROCEDURE — 96374 THER/PROPH/DIAG INJ IV PUSH: CPT | Performed by: EMERGENCY MEDICINE

## 2023-09-09 PROCEDURE — 96376 TX/PRO/DX INJ SAME DRUG ADON: CPT | Performed by: EMERGENCY MEDICINE

## 2023-09-09 PROCEDURE — 70450 CT HEAD/BRAIN W/O DYE: CPT

## 2023-09-09 PROCEDURE — 72148 MRI LUMBAR SPINE W/O DYE: CPT

## 2023-09-09 PROCEDURE — 83735 ASSAY OF MAGNESIUM: CPT

## 2023-09-09 PROCEDURE — 2580000003 HC RX 258: Performed by: STUDENT IN AN ORGANIZED HEALTH CARE EDUCATION/TRAINING PROGRAM

## 2023-09-09 PROCEDURE — 85730 THROMBOPLASTIN TIME PARTIAL: CPT

## 2023-09-09 PROCEDURE — 93005 ELECTROCARDIOGRAM TRACING: CPT | Performed by: STUDENT IN AN ORGANIZED HEALTH CARE EDUCATION/TRAINING PROGRAM

## 2023-09-09 PROCEDURE — 80307 DRUG TEST PRSMV CHEM ANLYZR: CPT

## 2023-09-09 PROCEDURE — 99285 EMERGENCY DEPT VISIT HI MDM: CPT | Performed by: EMERGENCY MEDICINE

## 2023-09-09 PROCEDURE — 72131 CT LUMBAR SPINE W/O DYE: CPT

## 2023-09-09 PROCEDURE — 70498 CT ANGIOGRAPHY NECK: CPT

## 2023-09-09 PROCEDURE — 85025 COMPLETE CBC W/AUTO DIFF WBC: CPT

## 2023-09-09 PROCEDURE — 80053 COMPREHEN METABOLIC PANEL: CPT

## 2023-09-09 RX ORDER — LORAZEPAM 2 MG/ML
1 INJECTION INTRAMUSCULAR ONCE
Status: COMPLETED | OUTPATIENT
Start: 2023-09-09 | End: 2023-09-09

## 2023-09-09 RX ORDER — MORPHINE SULFATE 4 MG/ML
4 INJECTION, SOLUTION INTRAMUSCULAR; INTRAVENOUS ONCE
Status: COMPLETED | OUTPATIENT
Start: 2023-09-09 | End: 2023-09-09

## 2023-09-09 RX ORDER — ONDANSETRON 2 MG/ML
4 INJECTION INTRAMUSCULAR; INTRAVENOUS ONCE
Status: COMPLETED | OUTPATIENT
Start: 2023-09-09 | End: 2023-09-09

## 2023-09-09 RX ORDER — 0.9 % SODIUM CHLORIDE 0.9 %
1000 INTRAVENOUS SOLUTION INTRAVENOUS ONCE
Status: COMPLETED | OUTPATIENT
Start: 2023-09-09 | End: 2023-09-09

## 2023-09-09 RX ADMIN — ONDANSETRON 4 MG: 2 INJECTION INTRAMUSCULAR; INTRAVENOUS at 07:08

## 2023-09-09 RX ADMIN — MORPHINE SULFATE 4 MG: 4 INJECTION, SOLUTION INTRAMUSCULAR; INTRAVENOUS at 03:59

## 2023-09-09 RX ADMIN — ONDANSETRON 4 MG: 2 INJECTION INTRAMUSCULAR; INTRAVENOUS at 03:59

## 2023-09-09 RX ADMIN — IOPAMIDOL 150 ML: 612 INJECTION, SOLUTION INTRAVENOUS at 03:26

## 2023-09-09 RX ADMIN — LORAZEPAM 1 MG: 2 INJECTION INTRAMUSCULAR; INTRAVENOUS at 07:08

## 2023-09-09 RX ADMIN — SODIUM CHLORIDE 1000 ML: 9 INJECTION, SOLUTION INTRAVENOUS at 03:57

## 2023-09-09 ASSESSMENT — PAIN - FUNCTIONAL ASSESSMENT: PAIN_FUNCTIONAL_ASSESSMENT: NONE - DENIES PAIN

## 2023-09-09 ASSESSMENT — PAIN DESCRIPTION - LOCATION: LOCATION: HEAD

## 2023-09-09 ASSESSMENT — PAIN SCALES - GENERAL
PAINLEVEL_OUTOF10: 6
PAINLEVEL_OUTOF10: 3

## 2023-09-09 NOTE — ED NOTES
Bailey notified patient c/o headache again.  States orders to be placed      Ankur Elaine RN  09/09/23 [id number]
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
## 2023-09-11 LAB
EKG ATRIAL RATE: 103 BPM
EKG P AXIS: 110 DEGREES
EKG P-R INTERVAL: 136 MS
EKG Q-T INTERVAL: 342 MS
EKG QRS DURATION: 90 MS
EKG QTC CALCULATION (BAZETT): 448 MS
EKG R AXIS: 126 DEGREES
EKG T AXIS: 150 DEGREES
EKG VENTRICULAR RATE: 103 BPM

## 2023-09-18 ENCOUNTER — CLINICAL SUPPORT (OUTPATIENT)
Dept: PRIMARY CARE | Facility: CLINIC | Age: 23
End: 2023-09-18
Payer: COMMERCIAL

## 2023-09-18 DIAGNOSIS — Z11.1 VISIT FOR TB SKIN TEST: ICD-10-CM

## 2023-09-18 PROCEDURE — 86580 TB INTRADERMAL TEST: CPT | Performed by: FAMILY MEDICINE

## 2023-09-18 NOTE — PROGRESS NOTES
Pt presents today for TB read on 2nd shot. Pt got 2nd shot on 9/7/23 and did not get it read today. Pt received 2nd, 2nd TB shot today and will get it read on 9/20/23.

## 2023-09-20 ENCOUNTER — CLINICAL SUPPORT (OUTPATIENT)
Dept: PRIMARY CARE | Facility: CLINIC | Age: 23
End: 2023-09-20
Payer: COMMERCIAL

## 2023-09-20 DIAGNOSIS — Z11.1 VISIT FOR MANTOUX TEST: ICD-10-CM

## 2023-09-20 NOTE — LETTER
"September 20, 2023    Patient: Jocelin Mckee   YOB: 2000   Date of Visit: 9/20/2023       To Whom It May Concern:    Our clinic recently performed a tuberculosis skin test on Ms. Mckee. The results of this test are as follows:    No results found for: \"PPD\"  This test was negative for tuberculosis exposure per current Centers for Disease Control guidelines.    A chest x-ray was not required.    If you have any questions or concerns, please don't hesitate to call.         Sincerely,      TAMMI Ashby         CC: No Recipients  "

## 2023-12-07 ENCOUNTER — HOSPITAL ENCOUNTER (EMERGENCY)
Facility: HOSPITAL | Age: 23
Discharge: HOME | End: 2023-12-07
Payer: COMMERCIAL

## 2023-12-07 VITALS
DIASTOLIC BLOOD PRESSURE: 79 MMHG | RESPIRATION RATE: 18 BRPM | TEMPERATURE: 99.3 F | OXYGEN SATURATION: 99 % | WEIGHT: 182 LBS | BODY MASS INDEX: 29.25 KG/M2 | HEART RATE: 96 BPM | SYSTOLIC BLOOD PRESSURE: 120 MMHG | HEIGHT: 66 IN

## 2023-12-07 DIAGNOSIS — A08.4 VIRAL GASTROENTERITIS: Primary | ICD-10-CM

## 2023-12-07 LAB
FLUAV RNA RESP QL NAA+PROBE: NOT DETECTED
FLUBV RNA RESP QL NAA+PROBE: NOT DETECTED
HCG UR QL IA.RAPID: NEGATIVE
SARS-COV-2 RNA RESP QL NAA+PROBE: NOT DETECTED

## 2023-12-07 PROCEDURE — 81025 URINE PREGNANCY TEST: CPT

## 2023-12-07 PROCEDURE — 2500000005 HC RX 250 GENERAL PHARMACY W/O HCPCS

## 2023-12-07 PROCEDURE — 99284 EMERGENCY DEPT VISIT MOD MDM: CPT

## 2023-12-07 PROCEDURE — 2500000004 HC RX 250 GENERAL PHARMACY W/ HCPCS (ALT 636 FOR OP/ED)

## 2023-12-07 PROCEDURE — 99283 EMERGENCY DEPT VISIT LOW MDM: CPT | Mod: 25

## 2023-12-07 PROCEDURE — 87636 SARSCOV2 & INF A&B AMP PRB: CPT

## 2023-12-07 PROCEDURE — 96372 THER/PROPH/DIAG INJ SC/IM: CPT

## 2023-12-07 RX ORDER — KETOROLAC TROMETHAMINE 30 MG/ML
30 INJECTION, SOLUTION INTRAMUSCULAR; INTRAVENOUS ONCE
Status: COMPLETED | OUTPATIENT
Start: 2023-12-07 | End: 2023-12-07

## 2023-12-07 RX ORDER — NAPROXEN SODIUM 550 MG/1
550 TABLET ORAL
Qty: 14 TABLET | Refills: 0 | Status: SHIPPED | OUTPATIENT
Start: 2023-12-07 | End: 2023-12-14

## 2023-12-07 RX ORDER — NAPROXEN 250 MG/1
500 TABLET ORAL ONCE
Status: DISCONTINUED | OUTPATIENT
Start: 2023-12-07 | End: 2023-12-07

## 2023-12-07 RX ORDER — ONDANSETRON 4 MG/1
4 TABLET, FILM COATED ORAL ONCE
Status: COMPLETED | OUTPATIENT
Start: 2023-12-07 | End: 2023-12-07

## 2023-12-07 RX ORDER — ONDANSETRON 4 MG/1
4 TABLET, FILM COATED ORAL EVERY 6 HOURS PRN
Qty: 28 TABLET | Refills: 0 | Status: SHIPPED | OUTPATIENT
Start: 2023-12-07 | End: 2023-12-14

## 2023-12-07 RX ADMIN — ONDANSETRON 4 MG: 4 TABLET, FILM COATED ORAL at 22:16

## 2023-12-07 RX ADMIN — KETOROLAC TROMETHAMINE 30 MG: 30 INJECTION, SOLUTION INTRAMUSCULAR at 22:16

## 2023-12-07 ASSESSMENT — LIFESTYLE VARIABLES
HAVE PEOPLE ANNOYED YOU BY CRITICIZING YOUR DRINKING: NO
HAVE YOU EVER FELT YOU SHOULD CUT DOWN ON YOUR DRINKING: NO
EVER FELT BAD OR GUILTY ABOUT YOUR DRINKING: NO
REASON UNABLE TO ASSESS: NO
EVER HAD A DRINK FIRST THING IN THE MORNING TO STEADY YOUR NERVES TO GET RID OF A HANGOVER: NO

## 2023-12-07 ASSESSMENT — PAIN SCALES - GENERAL
PAINLEVEL_OUTOF10: 2
PAINLEVEL_OUTOF10: 10 - WORST POSSIBLE PAIN

## 2023-12-07 ASSESSMENT — PAIN - FUNCTIONAL ASSESSMENT: PAIN_FUNCTIONAL_ASSESSMENT: 0-10

## 2023-12-07 ASSESSMENT — COLUMBIA-SUICIDE SEVERITY RATING SCALE - C-SSRS
1. IN THE PAST MONTH, HAVE YOU WISHED YOU WERE DEAD OR WISHED YOU COULD GO TO SLEEP AND NOT WAKE UP?: NO
2. HAVE YOU ACTUALLY HAD ANY THOUGHTS OF KILLING YOURSELF?: NO
6. HAVE YOU EVER DONE ANYTHING, STARTED TO DO ANYTHING, OR PREPARED TO DO ANYTHING TO END YOUR LIFE?: NO

## 2023-12-07 ASSESSMENT — PAIN DESCRIPTION - LOCATION: LOCATION: GENERALIZED

## 2023-12-07 ASSESSMENT — PAIN DESCRIPTION - PAIN TYPE: TYPE: ACUTE PAIN

## 2023-12-08 NOTE — ED PROVIDER NOTES
HPI   Chief Complaint   Patient presents with    Flu Symptoms     Nausea, vomiting, diarrhea, body aches going on for a day       History provided by: Patient    Limitations to history: None    CC: Body aches, nausea vomiting    HPI: 23-year-old female presents emergency department to be evaluated for flulike symptoms that started this morning.  She says that she is having diffuse bodyaches.  Denies one area of her body that is hurting more than the rest.  She denies take anything for prior to arrival.  States that she has had nausea and several episodes of nonbloody vomiting.  Has not tried to do a p.o. challenge.  Denies history of marijuana use.  Denies fever and chills.  Denies headache and vision changes.  Denies nausea vomiting denies back pain, neck pain, abdominal pain, flank pain.  Denies pelvic pain, vaginal bleeding or discharge.  Denies constipation, does report a few episodes of watery, nonbloody diarrhea.  Denies recent travel or changes of water sources.  Denies recent antibiotic use or history of C. difficile.  Denies recent illnesses or exposure to sick contacts.  Denies weakness and fatigue.  Denies all other systemic symptoms.    ROS: Negative unless mentioned in HPI    Social Hx: Denies tobacco, cocaine drug use    Medical Hx: Denies history of chronic medical conditions medication use.  Denies allergies.  Immunizations up-to-date.    Physical exam:    Constitutional: Patient is well-nourished and well-developed.   appears to not be female but nontoxic in appearance.Oriented to person, place, time, and situation.    HEENT: Head is normocephalic, atraumatic. Patient's airway is patent.  Tympanic membranes are clear bilaterally.  Nasal mucosa clear.  Mouth with normal mucosa.  Throat is not erythematous and there are no oropharyngeal exudates, uvula is midline.  No obvious facial deformities.    Eyes: Clear bilaterally.  Pupils are equal round and reactive to light and accommodation.  Extraocular  movements intact.      Cardiac: Tachycardic, regular rhythm.  Heart sounds S1, S2.  No murmurs, rubs, or gallops.  PMI nondisplaced.  No JVD.    Respiratory: Regular respiratory rate and effort.  Breath sounds are clear and equal bilaterally, no adventitious lung sounds.  Patient is speaking in full sentences and is in no apparent respiratory distress. No use of accessory muscles.      Gastrointestinal: Abdomen is soft, nondistended, and nontender.  There are no obvious deformities.  No rebound tenderness or guarding.  Bowel sounds are normal active.    Genitourinary: No CVA or flank tenderness.    Musculoskeletal: No reproducible tenderness.  No obvious skin or bony deformities.  Patient has equal range of motion in all extremities and no strength deficiencies.  No muscle or joint tenderness. No back or neck tenderness.  Capillary refill less than 3 seconds.  Strong peripheral pulses.  No sensory deficits.    Neurological: Patient is alert and oriented.  No focal deficits.  5/5 strength in all extremities.  Cranial nerves II through XII intact. GCS15.     Skin: Skin is normal color for race and is warm, dry, and intact.  No evidence of trauma.  No lesions, rashes, bruising, jaundice, or masses.    Psych: Appropriate mood and affect.  No apparent risk to self or others.    Heme/lymph: No adenopathy, lymphadenopathy, or splenomegaly    Physical exam is otherwise negative unless stated above or in history of present illness.  Patient updated on plan for lab testing, IV insertion, radiology imaging, and medications to be administered while in the ER (if indicated). Patient updated on expected wait times for testing and results. Patient provided my name and told to ask any staff member for questions or concerns if they should arise. Electronic medical record reviewed.     MDM    Upon initial assessment, patient appears to not be feeling well but nontoxic in appearance.    Patient presented to the emergency department  with the chief complaint Body aches with nausea, vomiting, diarrhea.  On arrival to the emergency department, vital signs were significant for tachycardia but otherwise unremarkable.    Will give the patient a shot of IM Toradol once her pregnancy test comes back and we will give her p.o. Zofran for nausea.  Will tested for COVID and the flu.    Patient swabs are negative.  Upon evaluation, she is resting comfortably.  No longer tachycardic.  Says that she is feeling much better and she is ready to go home.  She is likely experiencing a viral syndrome.  Will be discharged with p.o. Anaprox for the body aches and p.o. Zofran for the nausea.  Will follow-up with her PCP.  Discussed the importance of drinking plenty of fluids and resting.  All questions and concerns addressed.  Reasons to return to ER discussed.  Patient verbalized understanding and agreement with the treatment plan and they remained hemodynamically stable in the ER.    This note was dictated using a speech recognition program.  While an attempt was made at proof-reading to minimize errors, minor errors in transcription may be present                          Roberts Coma Scale Score: 15                  Patient History   History reviewed. No pertinent past medical history.  Past Surgical History:   Procedure Laterality Date    CT ANGIO NECK  9/9/2023    CT ANGIO NECK 9/9/2023    DILATION AND CURETTAGE OF UTERUS  2022     Family History   Family history unknown: Yes     Social History     Tobacco Use    Smoking status: Former     Types: Cigarettes    Smokeless tobacco: Current   Substance Use Topics    Alcohol use: Yes     Comment: occassionally    Drug use: Not Currently       Physical Exam   ED Triage Vitals [12/07/23 2121]   Temp Heart Rate Resp BP   36.5 °C (97.7 °F) (!) 140 18 (!) 133/94      SpO2 Temp Source Heart Rate Source Patient Position   97 % Temporal Monitor --      BP Location FiO2 (%)     -- --       Physical Exam    ED Course & MDM    Diagnoses as of 12/07/23 2319   Viral gastroenteritis       Medical Decision Making      Procedure  Procedures     Jeremi Cabrera PA-C  12/07/23 1939

## 2023-12-29 PROBLEM — R53.83 FATIGUE: Status: ACTIVE | Noted: 2023-02-19

## 2023-12-29 PROBLEM — B00.1 COLD SORE: Status: ACTIVE | Noted: 2023-02-19

## 2023-12-29 PROBLEM — F41.9 ANXIETY DISORDER: Status: ACTIVE | Noted: 2023-02-19

## 2023-12-29 PROBLEM — S93.409A ANKLE SPRAIN: Status: ACTIVE | Noted: 2023-02-19

## 2023-12-29 PROBLEM — F32.A DEPRESSIVE DISORDER: Status: ACTIVE | Noted: 2023-02-19

## 2023-12-29 PROBLEM — Z20.822 CONTACT WITH AND (SUSPECTED) EXPOSURE TO COVID-19: Status: ACTIVE | Noted: 2022-08-24

## 2023-12-29 PROBLEM — M54.9 PAIN IN BACK: Status: ACTIVE | Noted: 2023-02-19

## 2023-12-29 PROBLEM — B00.1 RECURRENT HERPES LABIALIS: Status: ACTIVE | Noted: 2023-12-29

## 2024-03-08 ENCOUNTER — OFFICE VISIT (OUTPATIENT)
Dept: OBGYN CLINIC | Age: 24
End: 2024-03-08

## 2024-03-08 VITALS
HEART RATE: 94 BPM | BODY MASS INDEX: 30.34 KG/M2 | DIASTOLIC BLOOD PRESSURE: 70 MMHG | WEIGHT: 188 LBS | SYSTOLIC BLOOD PRESSURE: 118 MMHG

## 2024-03-08 DIAGNOSIS — N89.8 VAGINAL DISCHARGE: ICD-10-CM

## 2024-03-08 DIAGNOSIS — Z72.51 UNPROTECTED SEXUAL INTERCOURSE: ICD-10-CM

## 2024-03-08 DIAGNOSIS — R10.9 LEFT FLANK PAIN: ICD-10-CM

## 2024-03-08 DIAGNOSIS — Z87.440 RECENT URINARY TRACT INFECTION: ICD-10-CM

## 2024-03-08 DIAGNOSIS — Z01.419 WOMEN'S ANNUAL ROUTINE GYNECOLOGICAL EXAMINATION: Primary | ICD-10-CM

## 2024-03-08 PROBLEM — R53.83 FATIGUE: Status: RESOLVED | Noted: 2023-02-19 | Resolved: 2024-03-08

## 2024-03-08 PROBLEM — S93.409A ANKLE SPRAIN: Status: RESOLVED | Noted: 2023-02-19 | Resolved: 2024-03-08

## 2024-03-08 PROBLEM — Z20.822 CONTACT WITH AND (SUSPECTED) EXPOSURE TO COVID-19: Status: RESOLVED | Noted: 2022-08-24 | Resolved: 2024-03-08

## 2024-03-08 PROBLEM — M54.9 PAIN IN BACK: Status: RESOLVED | Noted: 2023-02-19 | Resolved: 2024-03-08

## 2024-03-08 LAB
BACTERIA URNS QL MICRO: ABNORMAL /HPF
BILIRUB UR QL STRIP: NEGATIVE
CLARITY UR: CLEAR
COLOR UR: YELLOW
EPI CELLS #/AREA URNS HPF: ABNORMAL /HPF
GLUCOSE UR STRIP-MCNC: NEGATIVE MG/DL
HGB UR QL STRIP: ABNORMAL
KETONES UR STRIP-MCNC: NEGATIVE MG/DL
LEUKOCYTE ESTERASE UR QL STRIP: ABNORMAL
NITRITE UR QL STRIP: NEGATIVE
PH UR STRIP: 5.5 [PH] (ref 5–9)
PROT UR STRIP-MCNC: NEGATIVE MG/DL
RBC #/AREA URNS HPF: ABNORMAL /HPF (ref 0–2)
SP GR UR STRIP: 1.01 (ref 1–1.03)
UROBILINOGEN UR STRIP-ACNC: 0.2 E.U./DL
WBC #/AREA URNS HPF: ABNORMAL /HPF (ref 0–5)

## 2024-03-08 ASSESSMENT — ENCOUNTER SYMPTOMS
ABDOMINAL PAIN: 0
NAUSEA: 0
COUGH: 0
CONSTIPATION: 0
DIARRHEA: 0
SORE THROAT: 0
VOICE CHANGE: 0
TROUBLE SWALLOWING: 0
RHINORRHEA: 0
BACK PAIN: 1
VOMITING: 0
SHORTNESS OF BREATH: 0

## 2024-03-08 NOTE — PROGRESS NOTES
Chief Complaint:     Lyric Jennings is a 23 y.o. female who presents here today for complaints of:      Chief Complaint   Patient presents with    Annual Exam     Last pap 22 WNL    Frequent/Recurrent UTI     Pt keeps getting reoccuring uti's      History of Present Illness:     Lyric Jennings is a 23 y.o. female who presents for her annual exam.      Left Flank Pain, Recent UTI  Experiencing mild-moderate left flank pain.  When she has pain in this area, she has learned that it often means that she has a bladder infection.  Denies dysuria, urgency, and frequency.    She was just recently treated for a UTI, wants to make sure it is gone.    Vaginitis   Experiencing increased vaginal discharge without itching/irritation without increased odor    Past Medical History:     Allergies:  Pcn [penicillins]  No LMP recorded. (Menstrual status: IUD).  Obstetrical History:       Past Medical History:   Diagnosis Date    Ankle sprain 2023    Anxiety disorder 2023    Depressive disorder 2023    Herpes simplex virus (HSV) infection      Medications:     Current Outpatient Medications on File Prior to Visit   Medication Sig Dispense Refill    MIRENA, 52 MG, IUD 52 mg       miSOPROStol (CYTOTEC) 200 MCG tablet Take 2 tablets at bedtime with ibuprofen the night before your IUD insertion.  The take 2 tablets with ibuprofen 1-2 hours before your appointment for IUD insertion. 4 tablet 0    ibuprofen (ADVIL;MOTRIN) 800 MG tablet Take 1 tablet with misoprostol (Cytotec) the night before your IUD insertion.  Take 1 tablet with misoprostol 1-2 hours before your appointment for IUD insertion.  Then take every 8 hours as needed to relieve cramping. 15 tablet 0    sulfamethoxazole-trimethoprim (BACTRIM DS;SEPTRA DS) 800-160 MG per tablet take 1 tablet by mouth twice a day for 10 days take with food and PLENTY OF WATER      buPROPion (WELLBUTRIN XL) 150 MG extended release tablet Take 1 tablet by mouth 2 times

## 2024-03-09 LAB
BACTERIA UR CULT: NORMAL
CLUE CELLS VAG QL WET PREP: NORMAL
T VAGINALIS VAG QL WET PREP: NORMAL
TRICHOMONAS VAGINALIS SCREEN: NEGATIVE
YEAST VAG QL WET PREP: NORMAL

## 2024-03-13 LAB
C TRACH DNA CVX QL NAA+PROBE: NEGATIVE
N GONORRHOEA DNA CERV MUCUS QL NAA+PROBE: NEGATIVE

## 2024-09-16 RX ORDER — VALACYCLOVIR HYDROCHLORIDE 500 MG/1
500 TABLET, FILM COATED ORAL DAILY
Qty: 30 TABLET | Refills: 3 | Status: SHIPPED | OUTPATIENT
Start: 2024-09-16 | End: 2024-09-17 | Stop reason: SDUPTHER

## 2024-09-17 ENCOUNTER — PATIENT MESSAGE (OUTPATIENT)
Dept: OBGYN CLINIC | Age: 24
End: 2024-09-17

## 2024-09-17 RX ORDER — VALACYCLOVIR HYDROCHLORIDE 500 MG/1
500 TABLET, FILM COATED ORAL DAILY
Qty: 30 TABLET | Refills: 3 | Status: SHIPPED | OUTPATIENT
Start: 2024-09-17

## 2024-09-24 NOTE — TELEPHONE ENCOUNTER
Pt called stating valtrex refills were sent to the [facility] on [facility], but she needed a refill sent to the [facility] due to lack of transportation

## 2024-10-21 RX ORDER — VALACYCLOVIR HYDROCHLORIDE 500 MG/1
500 TABLET, FILM COATED ORAL DAILY
Qty: 30 TABLET | Refills: 3 | Status: SHIPPED | OUTPATIENT
Start: 2024-10-21

## 2024-11-01 ENCOUNTER — APPOINTMENT (OUTPATIENT)
Dept: PRIMARY CARE | Facility: CLINIC | Age: 24
End: 2024-11-01
Payer: COMMERCIAL

## 2024-11-12 ENCOUNTER — APPOINTMENT (OUTPATIENT)
Dept: PRIMARY CARE | Facility: CLINIC | Age: 24
End: 2024-11-12
Payer: COMMERCIAL

## 2024-11-25 RX ORDER — VALACYCLOVIR HYDROCHLORIDE 500 MG/1
500 TABLET, FILM COATED ORAL DAILY
Qty: 30 TABLET | Refills: 3 | Status: SHIPPED | OUTPATIENT
Start: 2024-11-25

## 2025-02-07 ENCOUNTER — OFFICE VISIT (OUTPATIENT)
Dept: OBGYN CLINIC | Age: 25
End: 2025-02-07
Payer: COMMERCIAL

## 2025-02-07 VITALS
WEIGHT: 181 LBS | HEIGHT: 66 IN | DIASTOLIC BLOOD PRESSURE: 72 MMHG | SYSTOLIC BLOOD PRESSURE: 118 MMHG | BODY MASS INDEX: 29.09 KG/M2

## 2025-02-07 DIAGNOSIS — Z97.5 IUD (INTRAUTERINE DEVICE) IN PLACE: ICD-10-CM

## 2025-02-07 DIAGNOSIS — R10.2 PELVIC PAIN: Primary | ICD-10-CM

## 2025-02-07 DIAGNOSIS — R10.2 PELVIC PAIN: ICD-10-CM

## 2025-02-07 PROCEDURE — G8417 CALC BMI ABV UP PARAM F/U: HCPCS | Performed by: OBSTETRICS & GYNECOLOGY

## 2025-02-07 PROCEDURE — G8428 CUR MEDS NOT DOCUMENT: HCPCS | Performed by: OBSTETRICS & GYNECOLOGY

## 2025-02-07 PROCEDURE — 99214 OFFICE O/P EST MOD 30 MIN: CPT | Performed by: OBSTETRICS & GYNECOLOGY

## 2025-02-07 PROCEDURE — 1036F TOBACCO NON-USER: CPT | Performed by: OBSTETRICS & GYNECOLOGY

## 2025-02-07 RX ORDER — DOCUSATE SODIUM 100 MG/1
100 CAPSULE, LIQUID FILLED ORAL 2 TIMES DAILY
Qty: 60 CAPSULE | Refills: 0 | Status: SHIPPED | OUTPATIENT
Start: 2025-02-07 | End: 2025-03-09

## 2025-02-07 RX ORDER — DOXYCYCLINE HYCLATE 100 MG
100 TABLET ORAL 2 TIMES DAILY
Qty: 14 TABLET | Refills: 0 | Status: SHIPPED | OUTPATIENT
Start: 2025-02-07 | End: 2025-02-14

## 2025-02-07 RX ORDER — VALACYCLOVIR HYDROCHLORIDE 500 MG/1
500 TABLET, FILM COATED ORAL DAILY
Qty: 30 TABLET | Refills: 11 | Status: SHIPPED | OUTPATIENT
Start: 2025-02-07

## 2025-02-07 ASSESSMENT — ENCOUNTER SYMPTOMS
SHORTNESS OF BREATH: 0
ABDOMINAL PAIN: 1
APNEA: 0

## 2025-02-07 NOTE — PROGRESS NOTES
Subjective:      Patient ID:  Lyric Jennings is a 24 y.o. female with chief complaint of:  Chief Complaint   Patient presents with    Pain     Pain with IUD, would like std testing        Patient has had an issue with recent onset of pelvic pain and some bleeding patient has an IUD IUD has been in for couple years since her last delivery.  Patient denies any pain with intercourse no fever or chill no abnormal discharge        Past Medical History:   Diagnosis Date    Ankle sprain 2/19/2023    Anxiety disorder 2/19/2023    Depressive disorder 2/19/2023    Herpes simplex virus (HSV) infection      No past surgical history on file.  Family History   Problem Relation Age of Onset    Cancer Sister         leukemia    Heart Disease Maternal Uncle      Current Outpatient Medications on File Prior to Visit   Medication Sig Dispense Refill    MIRENA, 52 MG, IUD 52 mg       miSOPROStol (CYTOTEC) 200 MCG tablet Take 2 tablets at bedtime with ibuprofen the night before your IUD insertion.  The take 2 tablets with ibuprofen 1-2 hours before your appointment for IUD insertion. 4 tablet 0    ibuprofen (ADVIL;MOTRIN) 800 MG tablet Take 1 tablet with misoprostol (Cytotec) the night before your IUD insertion.  Take 1 tablet with misoprostol 1-2 hours before your appointment for IUD insertion.  Then take every 8 hours as needed to relieve cramping. 15 tablet 0    sulfamethoxazole-trimethoprim (BACTRIM DS;SEPTRA DS) 800-160 MG per tablet take 1 tablet by mouth twice a day for 10 days take with food and PLENTY OF WATER      buPROPion (WELLBUTRIN XL) 150 MG extended release tablet Take 1 tablet by mouth 2 times daily      fluconazole (DIFLUCAN) 150 MG tablet Take 1 tablet every 2 days for 3 doses. (Patient not taking: Reported on 10/15/2022) 3 tablet 0    Fjpwli-AoSze-LlDvu-Meth-FA-DHA (PRENATE MINI) 18-0.6-0.4-350 MG CAPS Take 1 tablet by mouth daily (Patient not taking: Reported on 10/15/2022) 30 capsule 5     No current

## 2025-02-08 ENCOUNTER — HOSPITAL ENCOUNTER (OUTPATIENT)
Dept: ULTRASOUND IMAGING | Age: 25
Discharge: HOME | End: 2025-02-10
Attending: OBSTETRICS & GYNECOLOGY
Payer: COMMERCIAL

## 2025-02-08 DIAGNOSIS — Z97.5 IUD (INTRAUTERINE DEVICE) IN PLACE: ICD-10-CM

## 2025-02-08 PROCEDURE — 76856 US EXAM PELVIC COMPLETE: CPT

## 2025-02-08 PROCEDURE — 93975 VASCULAR STUDY: CPT

## 2025-02-08 PROCEDURE — 76830 TRANSVAGINAL US NON-OB: CPT

## 2025-02-11 LAB
C TRACH DNA CVX QL NAA+PROBE: NEGATIVE
N GONORRHOEA DNA CERV MUCUS QL NAA+PROBE: NEGATIVE

## 2025-09-02 ENCOUNTER — OFFICE VISIT (OUTPATIENT)
Dept: OBGYN CLINIC | Age: 25
End: 2025-09-02
Payer: COMMERCIAL

## 2025-09-02 VITALS
DIASTOLIC BLOOD PRESSURE: 84 MMHG | WEIGHT: 189.2 LBS | SYSTOLIC BLOOD PRESSURE: 128 MMHG | OXYGEN SATURATION: 98 % | BODY MASS INDEX: 30.54 KG/M2 | HEART RATE: 82 BPM

## 2025-09-02 DIAGNOSIS — N92.1 BREAKTHROUGH BLEEDING ASSOCIATED WITH INTRAUTERINE DEVICE (IUD): ICD-10-CM

## 2025-09-02 DIAGNOSIS — Z11.51 SCREENING FOR HUMAN PAPILLOMAVIRUS (HPV): ICD-10-CM

## 2025-09-02 DIAGNOSIS — R10.2 PELVIC PAIN: ICD-10-CM

## 2025-09-02 DIAGNOSIS — Z97.5 BREAKTHROUGH BLEEDING ASSOCIATED WITH INTRAUTERINE DEVICE (IUD): ICD-10-CM

## 2025-09-02 DIAGNOSIS — Z30.431 SURVEILLANCE FOR BIRTH CONTROL, INTRAUTERINE DEVICE: ICD-10-CM

## 2025-09-02 DIAGNOSIS — Z01.419 WOMEN'S ANNUAL ROUTINE GYNECOLOGICAL EXAMINATION: Primary | ICD-10-CM

## 2025-09-02 PROCEDURE — 99395 PREV VISIT EST AGE 18-39: CPT | Performed by: MIDWIFE

## 2025-09-02 SDOH — ECONOMIC STABILITY: FOOD INSECURITY: WITHIN THE PAST 12 MONTHS, YOU WORRIED THAT YOUR FOOD WOULD RUN OUT BEFORE YOU GOT MONEY TO BUY MORE.: NEVER TRUE

## 2025-09-02 SDOH — ECONOMIC STABILITY: FOOD INSECURITY: WITHIN THE PAST 12 MONTHS, THE FOOD YOU BOUGHT JUST DIDN'T LAST AND YOU DIDN'T HAVE MONEY TO GET MORE.: NEVER TRUE

## 2025-09-02 ASSESSMENT — PATIENT HEALTH QUESTIONNAIRE - PHQ9
8. MOVING OR SPEAKING SO SLOWLY THAT OTHER PEOPLE COULD HAVE NOTICED. OR THE OPPOSITE, BEING SO FIGETY OR RESTLESS THAT YOU HAVE BEEN MOVING AROUND A LOT MORE THAN USUAL: NOT AT ALL
3. TROUBLE FALLING OR STAYING ASLEEP: MORE THAN HALF THE DAYS
6. FEELING BAD ABOUT YOURSELF - OR THAT YOU ARE A FAILURE OR HAVE LET YOURSELF OR YOUR FAMILY DOWN: NOT AT ALL
SUM OF ALL RESPONSES TO PHQ QUESTIONS 1-9: 7
5. POOR APPETITE OR OVEREATING: SEVERAL DAYS
SUM OF ALL RESPONSES TO PHQ QUESTIONS 1-9: 7
9. THOUGHTS THAT YOU WOULD BE BETTER OFF DEAD, OR OF HURTING YOURSELF: NOT AT ALL
10. IF YOU CHECKED OFF ANY PROBLEMS, HOW DIFFICULT HAVE THESE PROBLEMS MADE IT FOR YOU TO DO YOUR WORK, TAKE CARE OF THINGS AT HOME, OR GET ALONG WITH OTHER PEOPLE: NOT DIFFICULT AT ALL
SUM OF ALL RESPONSES TO PHQ QUESTIONS 1-9: 7
7. TROUBLE CONCENTRATING ON THINGS, SUCH AS READING THE NEWSPAPER OR WATCHING TELEVISION: SEVERAL DAYS
2. FEELING DOWN, DEPRESSED OR HOPELESS: NOT AT ALL
1. LITTLE INTEREST OR PLEASURE IN DOING THINGS: SEVERAL DAYS
4. FEELING TIRED OR HAVING LITTLE ENERGY: MORE THAN HALF THE DAYS
SUM OF ALL RESPONSES TO PHQ QUESTIONS 1-9: 7

## 2025-09-02 ASSESSMENT — ENCOUNTER SYMPTOMS
VOICE CHANGE: 0
COUGH: 0
VOMITING: 0
DIARRHEA: 0
RHINORRHEA: 0
ABDOMINAL PAIN: 0
SORE THROAT: 0
NAUSEA: 0
SHORTNESS OF BREATH: 0
TROUBLE SWALLOWING: 0
CONSTIPATION: 0

## 2025-09-03 ENCOUNTER — RESULTS FOLLOW-UP (OUTPATIENT)
Dept: LABOR AND DELIVERY | Age: 25
End: 2025-09-03

## 2025-09-04 ENCOUNTER — APPOINTMENT (OUTPATIENT)
Dept: CT IMAGING | Age: 25
End: 2025-09-04
Payer: COMMERCIAL

## 2025-09-04 ENCOUNTER — HOSPITAL ENCOUNTER (EMERGENCY)
Age: 25
Discharge: HOME | End: 2025-09-04
Payer: COMMERCIAL

## 2025-09-04 VITALS
TEMPERATURE: 99 F | HEART RATE: 76 BPM | HEIGHT: 67 IN | WEIGHT: 184 LBS | BODY MASS INDEX: 28.88 KG/M2 | DIASTOLIC BLOOD PRESSURE: 86 MMHG | RESPIRATION RATE: 20 BRPM | SYSTOLIC BLOOD PRESSURE: 123 MMHG | OXYGEN SATURATION: 99 %

## 2025-09-04 DIAGNOSIS — N30.00 ACUTE CYSTITIS WITHOUT HEMATURIA: Primary | ICD-10-CM

## 2025-09-04 LAB
ALBUMIN SERPL-MCNC: 4.3 G/DL (ref 3.5–4.6)
ALP SERPL-CCNC: 82 U/L (ref 40–130)
ALT SERPL-CCNC: 18 U/L (ref 0–33)
ANION GAP SERPL CALCULATED.3IONS-SCNC: 9 MEQ/L (ref 9–15)
AST SERPL-CCNC: 19 U/L (ref 0–35)
BACTERIA URNS QL MICRO: ABNORMAL /HPF
BASOPHILS # BLD: 0 K/UL (ref 0–0.2)
BASOPHILS NFR BLD: 0.3 %
BILIRUB SERPL-MCNC: <0.2 MG/DL (ref 0.2–0.7)
BILIRUB UR QL STRIP: NEGATIVE
BUN SERPL-MCNC: 10 MG/DL (ref 6–20)
CALCIUM SERPL-MCNC: 8.8 MG/DL (ref 8.5–9.9)
CHLORIDE SERPL-SCNC: 103 MEQ/L (ref 95–107)
CLARITY UR: CLEAR
CO2 SERPL-SCNC: 26 MEQ/L (ref 20–31)
COLOR UR: YELLOW
CREAT SERPL-MCNC: 1 MG/DL (ref 0.5–0.9)
EOSINOPHIL # BLD: 0.2 K/UL (ref 0–0.7)
EOSINOPHIL NFR BLD: 1.7 %
EPI CELLS #/AREA URNS AUTO: ABNORMAL /HPF (ref 0–5)
ERYTHROCYTE [DISTWIDTH] IN BLOOD BY AUTOMATED COUNT: 11.9 % (ref 11.5–14.5)
GLOBULIN SER CALC-MCNC: 2.4 G/DL (ref 2.3–3.5)
GLUCOSE SERPL-MCNC: 95 MG/DL (ref 70–99)
GLUCOSE UR STRIP-MCNC: NEGATIVE MG/DL
HCG, URINE, POC: NEGATIVE
HCT VFR BLD AUTO: 41 % (ref 37–47)
HGB BLD-MCNC: 13.4 G/DL (ref 12–16)
HGB UR QL STRIP: NEGATIVE
HYALINE CASTS #/AREA URNS AUTO: ABNORMAL /HPF (ref 0–5)
KETONES UR STRIP-MCNC: NEGATIVE MG/DL
LEUKOCYTE ESTERASE UR QL STRIP: ABNORMAL
LIPASE SERPL-CCNC: 29 U/L (ref 12–95)
LYMPHOCYTES # BLD: 3.1 K/UL (ref 1–4.8)
LYMPHOCYTES NFR BLD: 29.4 %
Lab: 0
MCH RBC QN AUTO: 31.7 PG (ref 27–31.3)
MCHC RBC AUTO-ENTMCNC: 32.7 % (ref 33–37)
MCV RBC AUTO: 96.9 FL (ref 79.4–94.8)
MONOCYTES # BLD: 0.5 K/UL (ref 0.2–0.8)
MONOCYTES NFR BLD: 4.7 %
NEGATIVE QC PASS/FAIL: NORMAL
NEUTROPHILS # BLD: 6.6 K/UL (ref 1.4–6.5)
NEUTS SEG NFR BLD: 63.6 %
NITRITE UR QL STRIP: NEGATIVE
PERFORMED ON: NORMAL
PH UR STRIP: 7 [PH] (ref 5–9)
PLATELET # BLD AUTO: 265 K/UL (ref 130–400)
POC CREATININE: 1.1 MG/DL (ref 0.6–1.2)
POC SAMPLE TYPE: NORMAL
POSITIVE QC PASS/FAIL: NORMAL
POTASSIUM SERPL-SCNC: 3.9 MEQ/L (ref 3.4–4.9)
PROT SERPL-MCNC: 6.7 G/DL (ref 6.3–8)
PROT UR STRIP-MCNC: NEGATIVE MG/DL
RBC # BLD AUTO: 4.23 M/UL (ref 4.2–5.4)
RBC #/AREA URNS AUTO: ABNORMAL /HPF (ref 0–5)
SODIUM SERPL-SCNC: 138 MEQ/L (ref 135–144)
SP GR UR STRIP: 1.01 (ref 1–1.03)
T4 FREE SERPL-MCNC: 1.01 NG/DL (ref 0.84–1.68)
TSH REFLEX: 4.41 UIU/ML (ref 0.44–3.86)
URINE REFLEX TO CULTURE: YES
UROBILINOGEN UR STRIP-ACNC: 1 E.U./DL
WBC # BLD AUTO: 10.4 K/UL (ref 4.8–10.8)
WBC #/AREA URNS AUTO: ABNORMAL /HPF (ref 0–5)

## 2025-09-04 PROCEDURE — 84443 ASSAY THYROID STIM HORMONE: CPT

## 2025-09-04 PROCEDURE — 6360000002 HC RX W HCPCS: Performed by: PERSONAL EMERGENCY RESPONSE ATTENDANT

## 2025-09-04 PROCEDURE — 86403 PARTICLE AGGLUT ANTBDY SCRN: CPT

## 2025-09-04 PROCEDURE — 99285 EMERGENCY DEPT VISIT HI MDM: CPT

## 2025-09-04 PROCEDURE — 74177 CT ABD & PELVIS W/CONTRAST: CPT

## 2025-09-04 PROCEDURE — 80053 COMPREHEN METABOLIC PANEL: CPT

## 2025-09-04 PROCEDURE — 96375 TX/PRO/DX INJ NEW DRUG ADDON: CPT

## 2025-09-04 PROCEDURE — 84439 ASSAY OF FREE THYROXINE: CPT

## 2025-09-04 PROCEDURE — 83690 ASSAY OF LIPASE: CPT

## 2025-09-04 PROCEDURE — 85025 COMPLETE CBC W/AUTO DIFF WBC: CPT

## 2025-09-04 PROCEDURE — 96374 THER/PROPH/DIAG INJ IV PUSH: CPT

## 2025-09-04 PROCEDURE — 6370000000 HC RX 637 (ALT 250 FOR IP): Performed by: PERSONAL EMERGENCY RESPONSE ATTENDANT

## 2025-09-04 PROCEDURE — 6360000004 HC RX CONTRAST MEDICATION: Performed by: PERSONAL EMERGENCY RESPONSE ATTENDANT

## 2025-09-04 PROCEDURE — 87086 URINE CULTURE/COLONY COUNT: CPT

## 2025-09-04 PROCEDURE — 81001 URINALYSIS AUTO W/SCOPE: CPT

## 2025-09-04 RX ORDER — CEPHALEXIN 500 MG/1
1000 CAPSULE ORAL 2 TIMES DAILY
Qty: 28 CAPSULE | Refills: 0 | Status: SHIPPED | OUTPATIENT
Start: 2025-09-04 | End: 2025-09-11

## 2025-09-04 RX ORDER — MORPHINE SULFATE 4 MG/ML
4 INJECTION, SOLUTION INTRAMUSCULAR; INTRAVENOUS ONCE
Status: COMPLETED | OUTPATIENT
Start: 2025-09-04 | End: 2025-09-04

## 2025-09-04 RX ORDER — ONDANSETRON 2 MG/ML
4 INJECTION INTRAMUSCULAR; INTRAVENOUS ONCE
Status: COMPLETED | OUTPATIENT
Start: 2025-09-04 | End: 2025-09-04

## 2025-09-04 RX ORDER — CEPHALEXIN 500 MG/1
500 CAPSULE ORAL ONCE
Status: COMPLETED | OUTPATIENT
Start: 2025-09-04 | End: 2025-09-04

## 2025-09-04 RX ORDER — IOPAMIDOL 755 MG/ML
75 INJECTION, SOLUTION INTRAVASCULAR
Status: COMPLETED | OUTPATIENT
Start: 2025-09-04 | End: 2025-09-04

## 2025-09-04 RX ADMIN — MORPHINE SULFATE 4 MG: 4 INJECTION, SOLUTION INTRAMUSCULAR; INTRAVENOUS at 02:13

## 2025-09-04 RX ADMIN — ONDANSETRON 4 MG: 2 INJECTION, SOLUTION INTRAMUSCULAR; INTRAVENOUS at 02:12

## 2025-09-04 RX ADMIN — IOPAMIDOL 75 ML: 755 INJECTION, SOLUTION INTRAVENOUS at 03:06

## 2025-09-04 RX ADMIN — CEPHALEXIN 500 MG: 500 CAPSULE ORAL at 04:58

## 2025-09-04 ASSESSMENT — ENCOUNTER SYMPTOMS
BLOOD IN STOOL: 0
VOMITING: 0
ABDOMINAL PAIN: 1
COLOR CHANGE: 0
SHORTNESS OF BREATH: 0
COUGH: 0
RHINORRHEA: 0
DIARRHEA: 0
NAUSEA: 0
SORE THROAT: 0

## 2025-09-04 ASSESSMENT — PAIN DESCRIPTION - DESCRIPTORS: DESCRIPTORS: SHARP

## 2025-09-04 ASSESSMENT — LIFESTYLE VARIABLES
HOW OFTEN DO YOU HAVE A DRINK CONTAINING ALCOHOL: NEVER
HOW MANY STANDARD DRINKS CONTAINING ALCOHOL DO YOU HAVE ON A TYPICAL DAY: PATIENT DOES NOT DRINK

## 2025-09-04 ASSESSMENT — PAIN DESCRIPTION - ORIENTATION: ORIENTATION: LEFT

## 2025-09-04 ASSESSMENT — PAIN DESCRIPTION - PAIN TYPE: TYPE: ACUTE PAIN

## 2025-09-04 ASSESSMENT — PAIN SCALES - GENERAL: PAINLEVEL_OUTOF10: 9

## 2025-09-04 ASSESSMENT — PAIN - FUNCTIONAL ASSESSMENT: PAIN_FUNCTIONAL_ASSESSMENT: 0-10

## 2025-09-04 ASSESSMENT — PAIN DESCRIPTION - LOCATION: LOCATION: ABDOMEN

## 2025-09-05 LAB
BACTERIA UR CULT: ABNORMAL
BACTERIA UR CULT: ABNORMAL
ORGANISM: ABNORMAL